# Patient Record
Sex: FEMALE | Race: WHITE | HISPANIC OR LATINO | Employment: FULL TIME | URBAN - METROPOLITAN AREA
[De-identification: names, ages, dates, MRNs, and addresses within clinical notes are randomized per-mention and may not be internally consistent; named-entity substitution may affect disease eponyms.]

---

## 2017-01-26 ENCOUNTER — HOSPITAL ENCOUNTER (OUTPATIENT)
Dept: CT IMAGING | Facility: CLINIC | Age: 57
Discharge: HOME | End: 2017-01-26
Attending: INTERNAL MEDICINE | Admitting: INTERNAL MEDICINE
Payer: COMMERCIAL

## 2017-01-26 DIAGNOSIS — K43.9 VENTRAL HERNIA WITHOUT OBSTRUCTION OR GANGRENE: ICD-10-CM

## 2017-01-26 PROCEDURE — 74176 CT ABD & PELVIS W/O CONTRAST: CPT

## 2017-01-26 PROCEDURE — 25500064 ZZH RX 255 OP 636: Performed by: INTERNAL MEDICINE

## 2017-01-26 PROCEDURE — 25000125 ZZHC RX 250: Performed by: INTERNAL MEDICINE

## 2017-01-26 RX ORDER — IOPAMIDOL 755 MG/ML
100 INJECTION, SOLUTION INTRAVASCULAR ONCE
Status: DISCONTINUED | OUTPATIENT
Start: 2017-01-26 | End: 2017-01-27 | Stop reason: HOSPADM

## 2017-01-26 NOTE — LETTER
2/3/2017         Katie Hernandez   [address]  [facility] [address]        Dear Ms. Hernandez:    The results of your recent Your CT demonstrated colitis (inflammation of the colon) and cholelithiasis (gallstones). Recommend consultation with GI and colonoscopy.    Results for orders placed or performed during the hospital encounter of 01/26/17   CT Abdomen Pelvis w/o Contrast    Narrative    CT ABDOMEN/PELVIS WITHOUT CONTRAST 1/26/2017 4:27 PM    HISTORY: Left upper quadrant pain.    TECHNIQUE: Scans were obtained from the diaphragm through the pelvis  without IV contrast. Radiation dose for this scan was reduced using  automated exposure control, adjustment of the mA and/or kV according  to patient size, or iterative reconstruction technique.    COMPARISON: None.    FINDINGS: Liver, spleen, and pancreas are unremarkable. Multiple small  gallstones in the dependent portion of the gallbladder. Large cyst in  the lower pole of the left kidney. Both kidneys are otherwise normal.  Long section of significant wall thickening involving the descending  colon consistent with colitis. Moderate amount of feces seen in the  proximal colon. Small bowel is unremarkable without evidence of  obstruction. No abdominal or pelvic adenopathy. Remainder of the scan  is negative.      Impression    IMPRESSION:   1. Colitis involving a long segment of the descending colon.  2. Cholelithiasis.  3. Left renal cyst.  4. Remainder of the scan is negative.    CHANCE REGAN MD         Please note that test explanations are brief and do not reflect all diagnostic uses.  If you have any questions or concerns, please call the clinic at [phone].      Sincerely,      Miranda Chung sent on behalf of  Genevieve Leong MD

## 2017-01-27 ENCOUNTER — TELEPHONE (OUTPATIENT)
Dept: OBGYN | Facility: CLINIC | Age: 57
End: 2017-01-27

## 2017-01-27 ENCOUNTER — CARE COORDINATION (OUTPATIENT)
Dept: GASTROENTEROLOGY | Facility: CLINIC | Age: 57
End: 2017-01-27

## 2017-01-27 DIAGNOSIS — R10.12 ABDOMINAL PAIN, LEFT UPPER QUADRANT: Primary | ICD-10-CM

## 2017-01-27 NOTE — PROGRESS NOTES
Received call from Dr. Leong in [facility] that she would like patient seen in GI clinic within 2 weeks for new dx of colitis noted on recent CT scan.    Contacted patient and offered her an appointment on 1/30 with Dr. Espitia. Patient refused appointment and will follow up with GI team she was previously seeing. Did discuss with patient that we are available and she can contact us if she changes her mind.     Patient verbalized understanding.

## 2017-01-27 NOTE — TELEPHONE ENCOUNTER
CT of the abdomen was consistent with colitis. Recommend colonoscopy and evaluation by GI.  Genevieve Leong MD

## 2017-01-27 NOTE — TELEPHONE ENCOUNTER
Received call from Katie stating she just missed a call from us.    Review of EPIC shows Dr. Leong called and made recommendations regarding CT scan results. This recommendation for colonoscopy and GI evaluation for result of colitis was given to Katie. She does not believe that is what she has - believes it is more likely Crohn's or just had a bout with the flu and is going to wait on the colonoscopy as she just had one last year. She will go to her GI provider for consultation. She had no further questions.

## 2017-02-03 NOTE — ADDENDUM NOTE
Encounter addended by: Miranda Chung LPN on: 2/3/2017  1:03 PM<BR>     Documentation filed: Letters, Message

## 2017-02-14 ENCOUNTER — TELEPHONE (OUTPATIENT)
Dept: OBGYN | Facility: CLINIC | Age: 57
End: 2017-02-14

## 2017-02-14 DIAGNOSIS — K52.9 COLITIS: Primary | ICD-10-CM

## 2017-02-14 NOTE — TELEPHONE ENCOUNTER
Katie was able to schedule an appt with [facility] for March 8,2017  [address], [place] [address]  Ph.  [phone]  Fax [phone]    I will put in the GI referral and fax that along with her CT results.Pt indicated understanding and agreed with plan.

## 2017-02-14 NOTE — TELEPHONE ENCOUNTER
Received message on triage voicemail from Katie stating she called her GI and got an appointment for mid-March, called [facility] but couldn't get in until June. She is wondering if she should be seen sooner.    Review of EPIC shows pt was supposed to be seen within a few weeks of end of January. Dr. Leong had arranged for this but pt declined visit.    Spoke with Dr. Leong. She would like to know which GI Clinic Katie would like to be seen at. Can send CT results to her own GI provider so they can get her in sooner.    Tried to reach Katie but received voicemail.  Left message to call back.

## 2017-03-02 ENCOUNTER — OFFICE VISIT (OUTPATIENT)
Dept: UROLOGY | Facility: CLINIC | Age: 57
End: 2017-03-02

## 2017-03-02 VITALS
DIASTOLIC BLOOD PRESSURE: 64 MMHG | HEART RATE: 80 BPM | HEIGHT: 64 IN | WEIGHT: 140 LBS | BODY MASS INDEX: 23.9 KG/M2 | SYSTOLIC BLOOD PRESSURE: 124 MMHG

## 2017-03-02 DIAGNOSIS — R10.9 LEFT FLANK PAIN: ICD-10-CM

## 2017-03-02 DIAGNOSIS — R10.9 LEFT FLANK PAIN: Primary | ICD-10-CM

## 2017-03-02 LAB
ALBUMIN UR-MCNC: NEGATIVE MG/DL
APPEARANCE UR: CLEAR
BILIRUB UR QL STRIP: NEGATIVE
COLOR UR AUTO: YELLOW
GLUCOSE UR STRIP-MCNC: NEGATIVE MG/DL
HGB UR QL STRIP: ABNORMAL
KETONES UR STRIP-MCNC: NEGATIVE MG/DL
LEUKOCYTE ESTERASE UR QL STRIP: NEGATIVE
NITRATE UR QL: NEGATIVE
PH UR STRIP: 6.5 PH (ref 5–7)
SP GR UR STRIP: 1.01 (ref 1–1.03)
URN SPEC COLLECT METH UR: ABNORMAL
UROBILINOGEN UR STRIP-ACNC: 0.2 EU/DL (ref 0.2–1)

## 2017-03-02 PROCEDURE — 99203 OFFICE O/P NEW LOW 30 MIN: CPT | Performed by: UROLOGY

## 2017-03-02 PROCEDURE — 81003 URINALYSIS AUTO W/O SCOPE: CPT | Performed by: UROLOGY

## 2017-03-02 ASSESSMENT — PAIN SCALES - GENERAL: PAINLEVEL: NO PAIN (0)

## 2017-03-02 NOTE — PROGRESS NOTES
"History: It is placed to see this very pleasant 56-year-old lady in follow-up consultation today.  She is seeing me today because she is concerned about some back pain and does have a previous history of a large cyst in her left kidney consider a Bosniak 2 cyst.  She also has a past history of urinary stone disease.  She did have a hysterectomy 3 years ago.  She also has a recent diagnosis of ulcerative colitis.  She was concerned that the pain in the back may be related to the cyst in the kidney or some other nephrologic cause.  [facility] things listed above is otherwise stable at the present time    Past Medical History   Diagnosis Date     Abnormal stools 5/30/2013     Anemia      \"Related to heavy periods\"     Complex endometrial hyperplasia with atypia 10/17/2013     Path dx w complete hysterectomy.      Kidney cysts      left seen on CT     Macular degeneration 2015     Taking vitamin.     Menarche      cycles q 28 x 5 d     Menorrhagia 5/16/2007     Oct 2009 ->anemia to 7+ gms.  Resolved with OTC po FE 50 mg/d      Precancerous skin lesion      excised -- abdomen. ->yrly Derm cks     S/P total hysterectomy and bilateral salpingo-oophorectomy 10/17/2013     Path: -->Complex endometrial hyperplasia with atypia.      Swelling of the ankle, feet, or leg      Ankles     Unspecified hemorrhoids without mention of complication 2007     bleeding to anemia -- hgb 8.4 and lower....     Uterine leiomyoma 5/13/2009     [place] diagnosis [place] in [place] 8/[place]/2011 US in this EMR confirms multiple myomas with a 12 mm endometrial stripe.  (Problem list name updated by automated process. Provider to review and confirm*       Social History     Social History     Marital status:      Spouse name: N/A     Number of children: N/A     Years of education: N/A     Occupational History     Faculty [employer]     Teaches Translation     Social History Main Topics     Smoking status: Never Smoker     Smokeless " tobacco: Never Used     Alcohol use Yes      Comment: 1/2 d / mo     Drug use: No     Sexual activity: Yes     Partners: Male     Birth control/ protection: Surgical      Comment: Vastectomy     Other Topics Concern     Blood Transfusions No     Caffeine Concern No     Stopped all caffiene 2nd to breast pain     Occupational Exposure No     Hobby Hazards No     Sleep Concern No     Stress Concern No     Weight Concern No     Special Diet No     Back Care No     Exercise No     45' aerobic 3xs/wk     Bike Helmet No     Seat Belt No     Social History Narrative       Past Surgical History   Procedure Laterality Date     Colonoscopy       virtual     Appendectomy open  age 3      section   and      C lap,surg,colectomy, partial, w/anast  age 3     tumor in colon as child (removed tumor and appendix)     Laparoscopy diagnostic (gyn)       ectopic excision     Colonoscopy  2013     Procedure: COLONOSCOPY;  COLONOSCOPY;  Surgeon: Db Reed MD;  Location:  GI     Dilation and curettage, operative hysteroscopy with morcellator, combined  2013     Procedure: COMBINED DILATION AND CURETTAGE, OPERATIVE HYSTEROSCOPY WITH MORCELLATOR;  Dilation and Curettage, Hysteroscopy, Hysteroscopic  Polypectomy with Myosure ;  Surgeon: Concepción Gamboa MD;  Location: [facility] OR     Davinci hysterectomy total, bilateral salpingo-oophorectomy, combined  10/17/2013     Procedure: COMBINED DAVINCI HYSTERECTOMY TOTAL, SALPINGO-OOPHORECTOMY;  DaVinci Assisted Total Laparoscopic Hysterectomy/Bilateral Salpingo Oophorectomy Cystoscopy  ;  Surgeon: Zoya Vance MD;  Location: [facility] OR       Family History   Problem Relation Age of Onset     DIABETES Father 75     DMII     Hypertension Father 65     Hypertension Mother 45     Eye Disorder Mother      macular degeneration     Psychotic Disorder Mother      Borderline Personality Disorder ([id number])     Breast Cancer Paternal Grandmother 45       "age 55     CANCER Maternal Grandmother 60     uterine/breast     CANCER Maternal Aunt 60     ovarian     Cancer - colorectal Maternal Aunt      possible         Current Outpatient Prescriptions:      Multiple Vitamins-Minerals (VITEYES AREDS FORMULA/LUTEIN) CAPS, Take 1 tablet by mouth daily, Disp: , Rfl:      magnesium 100 MG CAPS, Take by mouth daily, Disp: , Rfl:      FISH OIL, daily, Disp: , Rfl:      Calcium Carbonate-Vitamin D (CALCIUM + D PO), , Disp: , Rfl:      Coenzyme Q10 (CO Q-10 PO), Take 1 capsule by mouth daily, Disp: , Rfl:      Ascorbic Acid (VITAMIN C CR PO), Take 1 tablet by mouth daily., Disp: , Rfl:     10 point ROS of systems including Constitutional, Eyes, Respiratory, Cardiovascular, Gastroenterology, Genitourinary, Integumentary, Muscularskeletal, Psychiatric were all negative except for pertinent positives noted in my HPI.    Examination:   /64 (BP Location: Right arm)  Pulse 80  Ht 1.626 m (5' 4\")  Wt 63.5 kg (140 lb)  LMP 08/20/2013  BMI 24.03 kg/m2  General Impression: Very pleasant lady in no acute distress, well-oriented in time place and person  Mental Status: Normal.  HEENT.  There is no evidence of jaundice and the mucous membranes are normal  Skin: His skin is normal to examination  Respiratory System: The respiratory cycle is normal  Lymph Nodes: Not examined  Back/Flank Tenderness: There is no evidence of kyphosis, scoliosis or lordosis  There is slight tenderness over each renal angle perhaps slightly greater on the right than the left.  Lateral rotation of the spine does create some discomfort in the same area of the back although flexion and extension does not seem to cause any significant symptoms  Cardiovascular System: Not examined  Abdominal Examination: Not examined  Extremities: Not examined  Genitial: Not examined  Rectal Examination: Not examined  Neurologic System: There are no focal abnormal neurological signs in the central or peripheral nervous " "systems    Impression: I carefully reviewed a recent CT scan which does show that the cyst lower pole of the left kidney is unchanged in size and configuration since her scan done 2 years ago.  There is no evidence of any stones in the kidney.  There is no evidence of hydronephrosis.  There is no evidence of any other renal masses.  There is no evidence of any renal atrophy.  The CT scan did however indicate involvement of the descending colon by ulcerative colitis.  She also does have some gallstones.  Urinalysis today was negative and the urine showed a specific gravity of 1.015.  There were no other remarkable features.  I think that the pain she's been getting in her back is probably musculoskeletal and not related to a visceral source.  I see no reason to attribute any of these symptoms to a renal etiology.  I would recommend that she have an ultrasound of the kidney every 2 years and that renal function tests should be a regular part of her routine physical examination.  I explained the entire situation carefully to her in detail today.  I answered all her questions    Plan: I will see her on a p.r.n. basis    Time: 30 minutes.  Greater than 50% was spent in discussion and consultation.    \"This dictation was performed with voice recognition software and may contain errors,  omissions and inadvertent word substitution.\"      "

## 2017-03-02 NOTE — NURSING NOTE
Chief Complaint   Patient presents with     Clinic Care Coordination - Follow-up     2 year follow-up      Yamilex Alonso LPN

## 2017-03-02 NOTE — MR AVS SNAPSHOT
After Visit Summary   3/2/2017    Katie Heranndez    MRN: [id number]           Patient Information     Date Of Birth          1960        Visit Information        Provider Department      3/2/2017 3:50 PM Geovani Dobson MD [facility]        Today's Diagnoses     Left flank pain           Follow-ups after your visit        Follow-up notes from your care team     Return if symptoms worsen or fail to improve.      Your next 10 appointments already scheduled     Mar 16, 2017  3:45 PM CDT   Return Visit with Chelsi Hay MD   [facility] ([facility])    [facility] [address]  [address]  [facility] [address] [phone]              Who to contact     If you have questions or need follow up information about today's clinic visit or your schedule please contact [facility] directly at [phone].  Normal or non-critical lab and imaging results will be communicated to you by [employer], letter or phone within 4 business days after the clinic has received the results. If you do not hear from us within 7 days, please contact the clinic through [id number] or phone. If you have a critical or abnormal lab result, we will notify you by phone as soon as possible.  Submit refill requests through [employer] or call your pharmacy and they will forward the refill request to us. Please allow 3 business days for your refill to be completed.          Additional Information About Your Visit        MyChart Information     [employer] gives you secure access to your electronic health record. If you see a primary care provider, you can also send messages to your care team and make appointments. If you have questions, please call your primary care clinic.  If you do not have a primary care provider, please call [phone] and they will assist you.        Care  "EveryWhere ID     This is your Care EveryWhere ID. This could be used by other organizations to access your [place] medical records  [id number]        Your Vitals Were     Pulse Height Last Period BMI (Body Mass Index)          80 1.626 m (5' 4\") 08/20/2013 24.03 kg/m2         Blood Pressure from Last 3 Encounters:   03/02/17 124/64   12/21/16 101/66   07/20/16 93/62    Weight from Last 3 Encounters:   03/02/17 63.5 kg (140 lb)   12/21/16 63.5 kg (140 lb)   07/20/16 61 kg (134 lb 8 oz)              We Performed the Following     UA without Microscopic        Primary Care Provider Office Phone # Fax #    Genevieve Terrance Leong [phone]        [facility] [address] [place] [phone]  [facility] [address]        Thank you!     Thank you for choosing [facility] [place]  for your care. Our goal is always to provide you with excellent care. Hearing back from our patients is one way we can continue to improve our services. Please take a few minutes to complete the written survey that you may receive in the mail after your visit with us. Thank you!             Your Updated Medication List - Protect others around you: Learn how to safely use, store and throw away your medicines at www.disposemymeds.org.          This list is accurate as of: 3/2/17 11:59 PM.  Always use your most recent med list.                   Brand Name Dispense Instructions for use    CALCIUM + D PO          CO Q-10 PO      Take 1 capsule by mouth daily       FISH OIL      daily       magnesium 100 MG Caps      Take by mouth daily       VITAMIN C CR PO      Take 1 tablet by mouth daily.       VITEYES AREDS FORMULA/LUTEIN Caps      Take 1 tablet by mouth daily         "

## 2017-03-02 NOTE — LETTER
"3/2/2017       RE: Katie Hernandez  [address]  [facility] [address]     Dear Colleague,    Thank you for referring your patient, Katie Hernandez, to the [facility] at [facility]. Please see a copy of my visit note below.    History: It is placed to see this very pleasant 56-year-old lady in follow-up consultation today.  She is seeing me today because she is concerned about some back pain and does have a previous history of a large cyst in her left kidney consider a Bosniak 2 cyst.  She also has a past history of urinary stone disease.  She did have a hysterectomy 3 years ago.  She also has a recent diagnosis of ulcerative colitis.  She was concerned that the pain in the back may be related to the cyst in the kidney or some other nephrologic cause.  [facility] things listed above is otherwise stable at the present time    Past Medical History   Diagnosis Date     Abnormal stools 5/30/2013     Anemia      \"Related to heavy periods\"     Complex endometrial hyperplasia with atypia 10/17/2013     Path dx w complete hysterectomy.      Kidney cysts      left seen on CT     Macular degeneration 2015     Taking vitamin.     Menarche      cycles q 28 x 5 d     Menorrhagia 5/16/2007     Oct 2009 ->anemia to 7+ gms.  Resolved with OTC po FE 50 mg/d      Precancerous skin lesion      excised -- abdomen. ->yrly Derm cks     S/P total hysterectomy and bilateral salpingo-oophorectomy 10/17/2013     Path: -->Complex endometrial hyperplasia with atypia.      Swelling of the ankle, feet, or leg      Ankles     Unspecified hemorrhoids without mention of complication 2007     bleeding to anemia -- hgb 8.4 and lower....     Uterine leiomyoma 5/13/2009     [place] 8/10/2011 US in this EMR confirms multiple myomas with a 12 mm endometrial stripe.  (Problem list name updated by automated process. 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     Social History Main Topics     Smoking status: Never Smoker     Smokeless tobacco: Never Used     Alcohol use Yes      Comment: 1/2 d / mo     Drug use: No     Sexual activity: Yes     Partners: Male     Birth control/ protection: Surgical      Comment: Vastectomy     Other Topics Concern     Blood Transfusions No     Caffeine Concern No     Stopped all caffiene 2nd to breast pain     Occupational Exposure No     Hobby Hazards No     Sleep Concern No     Stress Concern No     Weight Concern No     Special Diet No     Back Care No     Exercise No     45' aerobic 3xs/wk     Bike Helmet No     Seat Belt No     Social History Narrative       Past Surgical History   Procedure Laterality Date     Colonoscopy       virtual     Appendectomy open  age 3      section   and      C lap,surg,colectomy, partial, w/anast  age 3     tumor in colon as child (removed tumor and appendix)     Laparoscopy diagnostic (gyn)       ectopic excision     Colonoscopy  2013     Procedure: COLONOSCOPY;  COLONOSCOPY;  Surgeon: Db Reed MD;  Location:  GI     Dilation and curettage, operative hysteroscopy with morcellator, combined  2013     Procedure: COMBINED DILATION AND CURETTAGE, OPERATIVE HYSTEROSCOPY WITH MORCELLATOR;  Dilation and Curettage, Hysteroscopy, Hysteroscopic  Polypectomy with Myosure ;  Surgeon: Concepción Gamboa MD;  Location: [facility] OR     Davinci hysterectomy total, bilateral salpingo-oophorectomy, combined  10/17/2013     Procedure: COMBINED DAVINCI HYSTERECTOMY TOTAL, SALPINGO-OOPHORECTOMY;  DaVinci Assisted Total Laparoscopic Hysterectomy/Bilateral Salpingo Oophorectomy Cystoscopy  ;  Surgeon: Zoya Vance MD;  Location: [facility] OR  "      Family History   Problem Relation Age of Onset     DIABETES Father 75     DMII     Hypertension Father 65     Hypertension Mother 45     Eye Disorder Mother      macular degeneration     Psychotic Disorder Mother      Borderline Personality Disorder ([id number])     Breast Cancer Paternal Grandmother 45      age 55     CANCER Maternal Grandmother 60     uterine/breast     CANCER Maternal Aunt 60     ovarian     Cancer - colorectal Maternal Aunt      possible         Current Outpatient Prescriptions:      Multiple Vitamins-Minerals (VITEYES AREDS FORMULA/LUTEIN) CAPS, Take 1 tablet by mouth daily, Disp: , Rfl:      magnesium 100 MG CAPS, Take by mouth daily, Disp: , Rfl:      FISH OIL, daily, Disp: , Rfl:      Calcium Carbonate-Vitamin D (CALCIUM + D PO), , Disp: , Rfl:      Coenzyme Q10 (CO Q-10 PO), Take 1 capsule by mouth daily, Disp: , Rfl:      Ascorbic Acid (VITAMIN C CR PO), Take 1 tablet by mouth daily., Disp: , Rfl:     10 point ROS of systems including Constitutional, Eyes, Respiratory, Cardiovascular, Gastroenterology, Genitourinary, Integumentary, Muscularskeletal, Psychiatric were all negative except for pertinent positives noted in my HPI.    Examination:   /64 (BP Location: Right arm)  Pulse 80  Ht 1.626 m (5' 4\")  Wt 63.5 kg (140 lb)  LMP 2013  BMI 24.03 kg/m2  General Impression: Very pleasant lady in no acute distress, well-oriented in time place and person  Mental Status: Normal.  HEENT.  There is no evidence of jaundice and the mucous membranes are normal  Skin: His skin is normal to examination  Respiratory System: The respiratory cycle is normal  Lymph Nodes: Not examined  Back/Flank Tenderness: There is no evidence of kyphosis, scoliosis or lordosis  There is slight tenderness over each renal angle perhaps slightly greater on the right than the left.  Lateral rotation of the spine does create some discomfort in the same area of the back although flexion and extension does not " "seem to cause any significant symptoms  Cardiovascular System: Not examined  Abdominal Examination: Not examined  Extremities: Not examined  Genitial: Not examined  Rectal Examination: Not examined  Neurologic System: There are no focal abnormal neurological signs in the central or peripheral nervous systems    Impression: I carefully reviewed a recent CT scan which does show that the cyst lower pole of the left kidney is unchanged in size and configuration since her scan done 2 years ago.  There is no evidence of any stones in the kidney.  There is no evidence of hydronephrosis.  There is no evidence of any other renal masses.  There is no evidence of any renal atrophy.  The CT scan did however indicate involvement of the descending colon by ulcerative colitis.  She also does have some gallstones.  Urinalysis today was negative and the urine showed a specific gravity of 1.015.  There were no other remarkable features.  I think that the pain she's been getting in her back is probably musculoskeletal and not related to a visceral source.  I see no reason to attribute any of these symptoms to a renal etiology.  I would recommend that she have an ultrasound of the kidney every 2 years and that renal function tests should be a regular part of her routine physical examination.  I explained the entire situation carefully to her in detail today.  I answered all her questions    Plan: I will see her on a p.r.n. basis    Time: 30 minutes.  Greater than 50% was spent in discussion and consultation.    \"This dictation was performed with voice recognition software and may contain errors,  omissions and inadvertent word substitution.\"        Again, thank you for allowing me to participate in the care of your patient.      Sincerely,    Geovani Dobosn MD      "

## 2017-03-08 ENCOUNTER — TRANSFERRED RECORDS (OUTPATIENT)
Dept: HEALTH INFORMATION MANAGEMENT | Facility: CLINIC | Age: 57
End: 2017-03-08

## 2017-03-13 RX ORDER — ONDANSETRON 2 MG/ML
4 INJECTION INTRAMUSCULAR; INTRAVENOUS
Status: CANCELLED | OUTPATIENT
Start: 2017-03-13

## 2017-03-13 RX ORDER — LIDOCAINE 40 MG/G
CREAM TOPICAL
Status: CANCELLED | OUTPATIENT
Start: 2017-03-13

## 2017-03-14 ENCOUNTER — HOSPITAL ENCOUNTER (OUTPATIENT)
Facility: CLINIC | Age: 57
Discharge: HOME | End: 2017-03-14
Attending: COLON & RECTAL SURGERY | Admitting: COLON & RECTAL SURGERY
Payer: COMMERCIAL

## 2017-03-14 ENCOUNTER — TRANSFERRED RECORDS (OUTPATIENT)
Dept: HEALTH INFORMATION MANAGEMENT | Facility: CLINIC | Age: 57
End: 2017-03-14

## 2017-03-14 ENCOUNTER — SURGERY (OUTPATIENT)
Age: 57
End: 2017-03-14

## 2017-03-14 VITALS
OXYGEN SATURATION: 97 % | RESPIRATION RATE: 14 BRPM | DIASTOLIC BLOOD PRESSURE: 74 MMHG | HEART RATE: 78 BPM | SYSTOLIC BLOOD PRESSURE: 108 MMHG

## 2017-03-14 LAB — COLONOSCOPY: NORMAL

## 2017-03-14 PROCEDURE — 25000128 H RX IP 250 OP 636: Performed by: COLON & RECTAL SURGERY

## 2017-03-14 PROCEDURE — 25000125 ZZHC RX 250: Performed by: COLON & RECTAL SURGERY

## 2017-03-14 PROCEDURE — G0500 MOD SEDAT ENDO SERVICE >5YRS: HCPCS | Performed by: COLON & RECTAL SURGERY

## 2017-03-14 PROCEDURE — 88305 TISSUE EXAM BY PATHOLOGIST: CPT | Performed by: COLON & RECTAL SURGERY

## 2017-03-14 PROCEDURE — 88305 TISSUE EXAM BY PATHOLOGIST: CPT | Mod: 26 | Performed by: COLON & RECTAL SURGERY

## 2017-03-14 PROCEDURE — 45380 COLONOSCOPY AND BIOPSY: CPT | Performed by: COLON & RECTAL SURGERY

## 2017-03-14 RX ORDER — FLUMAZENIL 0.1 MG/ML
0.2 INJECTION, SOLUTION INTRAVENOUS
Status: DISCONTINUED | OUTPATIENT
Start: 2017-03-14 | End: 2017-03-14 | Stop reason: HOSPADM

## 2017-03-14 RX ORDER — NALOXONE HYDROCHLORIDE 0.4 MG/ML
.1-.4 INJECTION, SOLUTION INTRAMUSCULAR; INTRAVENOUS; SUBCUTANEOUS
Status: DISCONTINUED | OUTPATIENT
Start: 2017-03-14 | End: 2017-03-14 | Stop reason: HOSPADM

## 2017-03-14 RX ORDER — ONDANSETRON 4 MG/1
4 TABLET, ORALLY DISINTEGRATING ORAL EVERY 6 HOURS PRN
Status: DISCONTINUED | OUTPATIENT
Start: 2017-03-14 | End: 2017-03-14 | Stop reason: HOSPADM

## 2017-03-14 RX ORDER — FENTANYL CITRATE 50 UG/ML
INJECTION, SOLUTION INTRAMUSCULAR; INTRAVENOUS PRN
Status: DISCONTINUED | OUTPATIENT
Start: 2017-03-14 | End: 2017-03-14 | Stop reason: HOSPADM

## 2017-03-14 RX ORDER — ONDANSETRON 2 MG/ML
4 INJECTION INTRAMUSCULAR; INTRAVENOUS EVERY 6 HOURS PRN
Status: DISCONTINUED | OUTPATIENT
Start: 2017-03-14 | End: 2017-03-14 | Stop reason: HOSPADM

## 2017-03-14 RX ADMIN — FENTANYL CITRATE 100 MCG: 50 INJECTION, SOLUTION INTRAMUSCULAR; INTRAVENOUS at 14:28

## 2017-03-14 RX ADMIN — MIDAZOLAM HYDROCHLORIDE 1 MG: 1 INJECTION, SOLUTION INTRAMUSCULAR; INTRAVENOUS at 14:28

## 2017-03-14 NOTE — BRIEF OP NOTE
[facility] Brief Operative Note    Pre-operative diagnosis: Abnormal CT scan, RULE OUT COLITIS   Post-operative diagnosis normal appearing colon   Procedure: Procedure(s):  COLONOSCOPY - Wound Class: II-Clean Contaminated   Surgeon(s): Surgeon(s) and Role:     * Db Reed MD - Primary   Estimated blood loss: * No values recorded between 3/14/2017 12:00 AM and 3/14/2017  3:00 PM *    Specimens:   ID Type Source Tests Collected by Time Destination   A : random right sided colon biopsies Biopsy Large Intestine, Right/Ascending SURGICAL PATHOLOGY EXAM Db Reed MD 3/14/2017  2:53 PM    B : random transverse colon biopsies Biopsy Large Intestine, Transverse SURGICAL PATHOLOGY EXAM Db Reed MD 3/14/2017  2:54 PM    C : random left sided colon biopsies Biopsy Large Intestine, Left/Descending SURGICAL PATHOLOGY EXAM Db Reed MD 3/14/2017  2:54 PM    D : random sigmoid and rectal biopsies Biopsy Large Intestine, Sigmoid SURGICAL PATHOLOGY EXAM Db Reed MD 3/14/2017  2:55 PM       Findings: normal appearing colon  See provation procedure note in chart review.    Db Reed MD  [facility]  [phone]

## 2017-03-15 LAB — COPATH REPORT: NORMAL

## 2017-03-16 ENCOUNTER — OFFICE VISIT (OUTPATIENT)
Dept: OBGYN | Facility: CLINIC | Age: 57
End: 2017-03-16
Attending: OBSTETRICS & GYNECOLOGY
Payer: COMMERCIAL

## 2017-03-16 VITALS
DIASTOLIC BLOOD PRESSURE: 69 MMHG | SYSTOLIC BLOOD PRESSURE: 103 MMHG | WEIGHT: 139.1 LBS | HEART RATE: 74 BPM | HEIGHT: 64 IN | BODY MASS INDEX: 23.75 KG/M2

## 2017-03-16 DIAGNOSIS — Z12.31 ENCOUNTER FOR SCREENING MAMMOGRAM FOR BREAST CANCER: Primary | ICD-10-CM

## 2017-03-16 NOTE — MR AVS SNAPSHOT
After Visit Summary   3/16/2017    Katie Hernandez    MRN: [id number]           Patient Information     Date Of Birth          1960        Visit Information        Provider Department      3/16/2017 3:45 PM Chelsi Hay MD [facility]        Today's Diagnoses     Encounter for screening mammogram for breast cancer    -  1       Follow-ups after your visit        Future tests that were ordered for you today     Open Future Orders        Priority Expected Expires Ordered    Mammo Screening digital (bilateral) Routine  3/16/2018 3/16/2017            Who to contact     Please call your clinic at [phone] to:    Ask questions about your health    Make or cancel appointments    Discuss your medicines    Learn about your test results    Speak to your doctor   If you have compliments or concerns about an experience at your clinic, or if you wish to file a complaint, please contact [facility] Patient Relations at [phone] or email us at [email or web address]         Additional Information About Your Visit        MyChart Information     [employer] gives you secure access to your electronic health record. If you see a primary care provider, you can also send messages to your care team and make appointments. If you have questions, please call your primary care clinic.  If you do not have a primary care provider, please call [phone] and they will assist you.      [employer] is an electronic gateway that provides easy, online access to your medical records. With [employer], you can request a clinic appointment, read your test results, renew a prescription or communicate with your care team.     To access your existing account, please contact your [facility] or call [phone] for assistance.        Care EveryWhere ID     This is your Care EveryWhere ID. This could be used by other organizations to access  "your [facility] medical records  [id number]        Your Vitals Were     Pulse Height Last Period BMI (Body Mass Index)          74 1.626 m (5' 4\") 08/20/2013 23.88 kg/m2         Blood Pressure from Last 3 Encounters:   03/16/17 103/69   03/14/17 108/74   03/02/17 124/64    Weight from Last 3 Encounters:   03/16/17 63.1 kg (139 lb 1.6 oz)   03/02/17 63.5 kg (140 lb)   12/21/16 63.5 kg (140 lb)               Primary Care Provider Office Phone # Fax #    Genevieve Terrance Leong [phone]        [facility] [address]  [facility] [address]        Thank you!     Thank you for choosing [facility]  for your care. Our goal is always to provide you with excellent care. Hearing back from our patients is one way we can continue to improve our services. Please take a few minutes to complete the written survey that you may receive in the mail after your visit with us. Thank you!             Your Updated Medication List - Protect others around you: Learn how to safely use, store and throw away your medicines at www.disposemymeds.org.          This list is accurate as of: 3/16/17 11:59 PM.  Always use your most recent med list.                   Brand Name Dispense Instructions for use    CALCIUM + D PO          CO Q-10 PO      Take 1 capsule by mouth daily       FISH OIL      daily       magnesium 100 MG Caps      Take by mouth daily       VITAMIN C CR PO      Take 1 tablet by mouth daily.       VITEYES AREDS FORMULA/LUTEIN Caps      Take 1 tablet by mouth daily         "

## 2017-03-16 NOTE — LETTER
"3/16/2017       RE: Katie Hernandez  [address]  [facility] [address]     Dear Colleague,    Thank you for referring your patient, Katie Hernandez, to the [facility] at [facility]. Please see a copy of my visit note below.    S: 55 yo PM woman presents with concerns for left rib pain. States it is worse with lying on left side. Also new diagnosis of colitis viral versus ulcerative and is worried inflammation may be making everything worse including this pain.   Patient Active Problem List   Diagnosis     Anemia due to acute blood loss     Mild intermittent asthma     Lipoma of axilla -- left     Cyst of breast, right, solitary     Stiffness of joint, hand     Anemia     S/P total hysterectomy and bilateral salpingo-oophorectomy     Fibrocystic breast changes     Other anterior corneal dystrophies     Past Medical History   Diagnosis Date     Abnormal stools 5/30/2013     Anemia      \"Related to heavy periods\"     Complex endometrial hyperplasia with atypia 10/17/2013     Path dx w complete hysterectomy.      Kidney cysts      left seen on CT     Macular degeneration 2015     Taking vitamin.     Menarche      cycles q 28 x 5 d     Menorrhagia 5/16/2007     Oct 2009 ->anemia to 7+ gms.  Resolved with OTC po FE 50 mg/d      Precancerous skin lesion      excised -- abdomen. ->yrly Derm cks     S/P total hysterectomy and bilateral salpingo-oophorectomy 10/17/2013     Path: -->Complex endometrial hyperplasia with atypia.      Swelling of the ankle, feet, or leg      Ankles     Unspecified hemorrhoids without mention of complication 2007     bleeding to anemia -- hgb 8.4 and lower....     Uterine leiomyoma 5/13/2009     [place] diagnosis [place] 8/10/2011 US in this EMR confirms multiple myomas with a 12 mm endometrial stripe.  (Problem list name updated by automated process. Provider to review and confirm*     Past Surgical History " "  Procedure Laterality Date     Colonoscopy       virtual     Appendectomy open  age 3      section   and      C lap,surg,colectomy, partial, w/anast  age 3     tumor in colon as child (removed tumor and appendix)     Laparoscopy diagnostic (gyn)       ectopic excision     Colonoscopy  2013     Procedure: COLONOSCOPY;  COLONOSCOPY;  Surgeon: Db Reed MD;  Location:  GI     Dilation and curettage, operative hysteroscopy with morcellator, combined  2013     Procedure: COMBINED DILATION AND CURETTAGE, OPERATIVE HYSTEROSCOPY WITH MORCELLATOR;  Dilation and Curettage, Hysteroscopy, Hysteroscopic  Polypectomy with Myosure ;  Surgeon: Concepción Gamboa MD;  Location: [facility] OR     Davinci hysterectomy total, bilateral salpingo-oophorectomy, combined  10/17/2013     Procedure: COMBINED DAVINCI HYSTERECTOMY TOTAL, SALPINGO-OOPHORECTOMY;  DaVinci Assisted Total Laparoscopic Hysterectomy/Bilateral Salpingo Oophorectomy Cystoscopy  ;  Surgeon: Zoya Vance MD;  Location: [facility] OR     Colonoscopy N/A 3/14/2017     Procedure: COMBINED COLONOSCOPY, SINGLE OR MULTIPLE BIOPSY/POLYPECTOMY BY BIOPSY;  Surgeon: Db Reed MD;  Location:  GI     Obstetric History       T2      TAB0   SAB2   E1   M0   L2       # Outcome Date GA Lbr Lenard/2nd Weight Sex Delivery Anes PTL Lv   5 Term 10/26/97 38w0d 21:00 3.7 kg (8 lb 2.5 oz) M CS-LTranv   LIVE BIRTH      Name: Christopher   4 Term 92 37w0d 26:00 3.6 kg (7 lb 15 oz) F CS-LTranv   LIVE BIRTH      Name: Keren   3 Ectopic            2 SAB            1 SAB                 /69  Pulse 74  Ht 1.626 m (5' 4\")  Wt 63.1 kg (139 lb 1.6 oz)  LMP 2013  BMI 23.88 kg/m2  Exam:  Constitutional: healthy, alert and mild distress  Head:   Neck: Neck supple. No adenopathy. Thyroid symmetric, normal size,, Carotids without bruits.  ENT:   Cardiovascular: negative, PMI normal. No lifts, heaves, or thrills. " RRR. No murmurs, clicks gallops or rub  Respiratory: negative, Percussion normal. Good diaphragmatic excursion. Lungs clear  Chest: no axillary lymphadenopathy bilaterally. Ribs without pain with palpation or movement. Sternum nontender.   Gastrointestinal: Abdomen soft, non-tender. BS normal. No masses, organomegaly  : Deferred  Musculoskeletal: extremities normal- no gross deformities noted, gait normal and normal muscle tone  Skin: no suspicious lesions or rashes  Neurologic: Gait normal. Reflexes normal and symmetric. Sensation grossly WNL.  Psychiatric: mentation appears normal and affect normal/bright  Hematologic/Lymphatic/Immunologic: Normal cervical lymph nodes      A: left thorax pain of uncertain etiology: costochondritis versus musculskeletal pull versus colitis  P: recommend follow up with primary care if not improved  Chelsi Hay      Again, thank you for allowing me to participate in the care of your patient.      Sincerely,    Chelsi Hay MD

## 2017-03-17 NOTE — PROGRESS NOTES
"S: 55 yo PM woman presents with concerns for left rib pain. States it is worse with lying on left side. Also new diagnosis of colitis viral versus ulcerative and is worried inflammation may be making everything worse including this pain.   Patient Active Problem List   Diagnosis     Anemia due to acute blood loss     Mild intermittent asthma     Lipoma of axilla -- left     Cyst of breast, right, solitary     Stiffness of joint, hand     Anemia     S/P total hysterectomy and bilateral salpingo-oophorectomy     Fibrocystic breast changes     Other anterior corneal dystrophies     Past Medical History   Diagnosis Date     Abnormal stools 2013     Anemia      \"Related to heavy periods\"     Complex endometrial hyperplasia with atypia 10/17/2013     Path dx w complete hysterectomy.      Kidney cysts      left seen on CT     Macular degeneration      Taking vitamin.     Menarche      cycles q 28 x 5 d     Menorrhagia 2007     Oct 2009 ->anemia to 7+ gms.  Resolved with OTC po FE 50 mg/d      Precancerous skin lesion      excised -- abdomen. ->yrly Derm cks     S/P total hysterectomy and bilateral salpingo-oophorectomy 10/17/2013     Path: -->Complex endometrial hyperplasia with atypia.      Swelling of the ankle, feet, or leg      Ankles     Unspecified hemorrhoids without mention of complication 2007     bleeding to anemia -- hgb 8.4 and lower....     Uterine leiomyoma 2009     [place] diagnosis while [place] 8/10/2011 US in this EMR confirms multiple myomas with a 12 mm endometrial stripe.  (Problem list name updated by automated process. Provider to review and confirm*     Past Surgical History   Procedure Laterality Date     Colonoscopy       virtual     Appendectomy open  age 3      section   and      C lap,surg,colectomy, partial, w/anast  age 3     tumor in colon as child (removed tumor and appendix)     Laparoscopy diagnostic (gyn)       ectopic excision     Colonoscopy " " 2013     Procedure: COLONOSCOPY;  COLONOSCOPY;  Surgeon: Db Reed MD;  Location:  GI     Dilation and curettage, operative hysteroscopy with morcellator, combined  2013     Procedure: COMBINED DILATION AND CURETTAGE, OPERATIVE HYSTEROSCOPY WITH MORCELLATOR;  Dilation and Curettage, Hysteroscopy, Hysteroscopic  Polypectomy with Myosure ;  Surgeon: Concepción Gamboa MD;  [facility]     Davinci hysterectomy total, bilateral salpingo-oophorectomy, combined  10/17/2013     Procedure: COMBINED DAVINCI HYSTERECTOMY TOTAL, SALPINGO-OOPHORECTOMY;  DaVinci Assisted Total Laparoscopic Hysterectomy/Bilateral Salpingo Oophorectomy Cystoscopy  ;  Surgeon: Zoya Vance MD;  [facility]: [facility]     Colonoscopy N/A 3/14/2017     Procedure: COMBINED COLONOSCOPY, SINGLE OR MULTIPLE BIOPSY/POLYPECTOMY BY BIOPSY;  Surgeon: Db Reed MD;  Location:  GI     Obstetric History       T2      TAB0   SAB2   E1   M0   L2       # Outcome Date GA Lbr Lenard/2nd Weight Sex Delivery Anes PTL Lv   5 Term 10/26/97 38w0d 21:00 3.7 kg (8 lb 2.5 oz) M CS-LTranv   LIVE BIRTH      Name: Christopher   4 Term 92 37w0d 26:00 3.6 kg (7 lb 15 oz) F CS-LTranv   LIVE BIRTH      Name: Keren   3 Ectopic            2             1                  /69  Pulse 74  Ht 1.626 m (5' 4\")  Wt 63.1 kg (139 lb 1.6 oz)  LMP 2013  BMI 23.88 kg/m2  Exam:  Constitutional: healthy, alert and mild distress  Head:   Neck: Neck supple. No adenopathy. Thyroid symmetric, normal size,, Carotids without bruits.  ENT:   Cardiovascular: negative, PMI normal. No lifts, heaves, or thrills. RRR. No murmurs, clicks gallops or rub  Respiratory: negative, Percussion normal. Good diaphragmatic excursion. Lungs clear  Chest: no axillary lymphadenopathy bilaterally. Ribs without pain with palpation or movement. Sternum nontender.   Gastrointestinal: Abdomen soft, non-tender. BS normal. No masses, " organomegaly  : Deferred  Musculoskeletal: extremities normal- no gross deformities noted, gait normal and normal muscle tone  Skin: no suspicious lesions or rashes  Neurologic: Gait normal. Reflexes normal and symmetric. Sensation grossly WNL.  Psychiatric: mentation appears normal and affect normal/bright  Hematologic/Lymphatic/Immunologic: Normal cervical lymph nodes      A: left thorax pain of uncertain etiology: costochondritis versus musculskeletal pull versus colitis  P: recommend follow up with primary care if not improved  Chelsi Hay

## 2017-03-28 ENCOUNTER — MYC MEDICAL ADVICE (OUTPATIENT)
Dept: OBGYN | Facility: CLINIC | Age: 57
End: 2017-03-28

## 2017-03-28 DIAGNOSIS — K52.9 COLITIS: Primary | ICD-10-CM

## 2017-03-30 ENCOUNTER — HOSPITAL ENCOUNTER (OUTPATIENT)
Dept: LAB | Facility: CLINIC | Age: 57
Discharge: HOME | End: 2017-03-30
Attending: OBSTETRICS & GYNECOLOGY | Admitting: OBSTETRICS & GYNECOLOGY
Payer: COMMERCIAL

## 2017-03-30 DIAGNOSIS — K52.9 COLITIS: ICD-10-CM

## 2017-03-30 LAB
ALBUMIN SERPL-MCNC: 3.6 G/DL (ref 3.4–5)
ALP SERPL-CCNC: 74 U/L (ref 40–150)
ALT SERPL W P-5'-P-CCNC: 19 U/L (ref 0–50)
AST SERPL W P-5'-P-CCNC: 14 U/L (ref 0–45)
BILIRUB DIRECT SERPL-MCNC: 0.1 MG/DL (ref 0–0.2)
BILIRUB SERPL-MCNC: 0.3 MG/DL (ref 0.2–1.3)
ERYTHROCYTE [SEDIMENTATION RATE] IN BLOOD BY WESTERGREN METHOD: 12 MM/H (ref 0–30)
PROT SERPL-MCNC: 7.4 G/DL (ref 6.8–8.8)

## 2017-03-30 PROCEDURE — 85652 RBC SED RATE AUTOMATED: CPT | Performed by: OBSTETRICS & GYNECOLOGY

## 2017-03-30 PROCEDURE — 36415 COLL VENOUS BLD VENIPUNCTURE: CPT | Performed by: OBSTETRICS & GYNECOLOGY

## 2017-03-30 PROCEDURE — 80076 HEPATIC FUNCTION PANEL: CPT | Performed by: OBSTETRICS & GYNECOLOGY

## 2017-04-06 ENCOUNTER — HOSPITAL ENCOUNTER (OUTPATIENT)
Dept: LAB | Facility: CLINIC | Age: 57
Discharge: HOME | End: 2017-04-06
Attending: OBSTETRICS & GYNECOLOGY | Admitting: OBSTETRICS & GYNECOLOGY
Payer: COMMERCIAL

## 2017-04-06 DIAGNOSIS — Z78.0 MENOPAUSE PRESENT: ICD-10-CM

## 2017-04-06 DIAGNOSIS — D50.8 IRON DEFICIENCY ANEMIA SECONDARY TO INADEQUATE DIETARY IRON INTAKE: ICD-10-CM

## 2017-04-06 LAB
ALBUMIN SERPL-MCNC: 3.7 G/DL (ref 3.4–5)
ALP SERPL-CCNC: 83 U/L (ref 40–150)
ALT SERPL W P-5'-P-CCNC: 21 U/L (ref 0–50)
ANION GAP SERPL CALCULATED.3IONS-SCNC: 6 MMOL/L (ref 3–14)
AST SERPL W P-5'-P-CCNC: 14 U/L (ref 0–45)
BASOPHILS # BLD AUTO: 0 10E9/L (ref 0–0.2)
BASOPHILS NFR BLD AUTO: 0.3 %
BILIRUB SERPL-MCNC: 0.4 MG/DL (ref 0.2–1.3)
BUN SERPL-MCNC: 16 MG/DL (ref 7–30)
CALCIUM SERPL-MCNC: 8.8 MG/DL (ref 8.5–10.1)
CHLORIDE SERPL-SCNC: 105 MMOL/L (ref 94–109)
CO2 SERPL-SCNC: 31 MMOL/L (ref 20–32)
CREAT SERPL-MCNC: 0.68 MG/DL (ref 0.52–1.04)
DIFFERENTIAL METHOD BLD: NORMAL
EOSINOPHIL # BLD AUTO: 0.1 10E9/L (ref 0–0.7)
EOSINOPHIL NFR BLD AUTO: 1 %
ERYTHROCYTE [DISTWIDTH] IN BLOOD BY AUTOMATED COUNT: 12.9 % (ref 10–15)
GFR SERPL CREATININE-BSD FRML MDRD: 89 ML/MIN/1.7M2
GLUCOSE SERPL-MCNC: 96 MG/DL (ref 70–99)
HCT VFR BLD AUTO: 39.7 % (ref 35–47)
HGB BLD-MCNC: 13.4 G/DL (ref 11.7–15.7)
IMM GRANULOCYTES # BLD: 0 10E9/L (ref 0–0.4)
IMM GRANULOCYTES NFR BLD: 0 %
LYMPHOCYTES # BLD AUTO: 2.2 10E9/L (ref 0.8–5.3)
LYMPHOCYTES NFR BLD AUTO: 36 %
MCH RBC QN AUTO: 30.2 PG (ref 26.5–33)
MCHC RBC AUTO-ENTMCNC: 33.8 G/DL (ref 31.5–36.5)
MCV RBC AUTO: 90 FL (ref 78–100)
MONOCYTES # BLD AUTO: 0.4 10E9/L (ref 0–1.3)
MONOCYTES NFR BLD AUTO: 7.2 %
NEUTROPHILS # BLD AUTO: 3.3 10E9/L (ref 1.6–8.3)
NEUTROPHILS NFR BLD AUTO: 55.5 %
NRBC # BLD AUTO: 0 10*3/UL
NRBC BLD AUTO-RTO: 0 /100
PLATELET # BLD AUTO: 267 10E9/L (ref 150–450)
POTASSIUM SERPL-SCNC: 4.2 MMOL/L (ref 3.4–5.3)
PROT SERPL-MCNC: 7.3 G/DL (ref 6.8–8.8)
RBC # BLD AUTO: 4.43 10E12/L (ref 3.8–5.2)
SODIUM SERPL-SCNC: 142 MMOL/L (ref 133–144)
WBC # BLD AUTO: 6 10E9/L (ref 4–11)

## 2017-04-06 PROCEDURE — 36415 COLL VENOUS BLD VENIPUNCTURE: CPT | Performed by: OBSTETRICS & GYNECOLOGY

## 2017-04-06 PROCEDURE — 80053 COMPREHEN METABOLIC PANEL: CPT | Performed by: OBSTETRICS & GYNECOLOGY

## 2017-04-06 PROCEDURE — 82306 VITAMIN D 25 HYDROXY: CPT | Performed by: OBSTETRICS & GYNECOLOGY

## 2017-04-06 PROCEDURE — 85025 COMPLETE CBC W/AUTO DIFF WBC: CPT | Performed by: OBSTETRICS & GYNECOLOGY

## 2017-04-07 LAB — DEPRECATED CALCIDIOL+CALCIFEROL SERPL-MC: 34 UG/L (ref 20–75)

## 2017-04-12 ENCOUNTER — TELEPHONE (OUTPATIENT)
Dept: FAMILY MEDICINE | Facility: CLINIC | Age: 57
End: 2017-04-12

## 2017-04-12 ENCOUNTER — TRANSFERRED RECORDS (OUTPATIENT)
Dept: HEALTH INFORMATION MANAGEMENT | Facility: CLINIC | Age: 57
End: 2017-04-12

## 2017-04-12 NOTE — TELEPHONE ENCOUNTER
Reason for Call:  Other appointment and call back    Detailed comments: pt is wondering if dr. Santoyo will take her on as a a new patient. Pt was recommended by dr. Dailey from rectal surgery specialty    Phone Number Patient can be reached at: Home number on file [phone] (home)    Best Time: anytime    Can we leave a detailed message on this number? YES    Call taken on 4/12/2017 at 5:06 PM by Christal Chamberlain

## 2017-04-25 ENCOUNTER — OFFICE VISIT (OUTPATIENT)
Dept: FAMILY MEDICINE | Facility: CLINIC | Age: 57
End: 2017-04-25
Payer: COMMERCIAL

## 2017-04-25 ENCOUNTER — RADIANT APPOINTMENT (OUTPATIENT)
Dept: GENERAL RADIOLOGY | Facility: CLINIC | Age: 57
End: 2017-04-25
Attending: INTERNAL MEDICINE
Payer: COMMERCIAL

## 2017-04-25 DIAGNOSIS — R10.84 ABDOMINAL PAIN, GENERALIZED: ICD-10-CM

## 2017-04-25 DIAGNOSIS — R06.02 SOB (SHORTNESS OF BREATH): ICD-10-CM

## 2017-04-25 DIAGNOSIS — R07.89 OTHER CHEST PAIN: ICD-10-CM

## 2017-04-25 DIAGNOSIS — R19.7 DIARRHEA, UNSPECIFIED TYPE: ICD-10-CM

## 2017-04-25 DIAGNOSIS — R11.0 NAUSEA: Primary | ICD-10-CM

## 2017-04-25 PROCEDURE — 99215 OFFICE O/P EST HI 40 MIN: CPT | Performed by: INTERNAL MEDICINE

## 2017-04-25 PROCEDURE — 86140 C-REACTIVE PROTEIN: CPT | Performed by: INTERNAL MEDICINE

## 2017-04-25 PROCEDURE — 71020 XR CHEST 2 VW: CPT

## 2017-04-25 PROCEDURE — 36415 COLL VENOUS BLD VENIPUNCTURE: CPT | Performed by: INTERNAL MEDICINE

## 2017-04-25 PROCEDURE — 85379 FIBRIN DEGRADATION QUANT: CPT | Performed by: INTERNAL MEDICINE

## 2017-04-25 NOTE — PATIENT INSTRUCTIONS
I will have the heart clinic call you to do the stress test.    I will send you all the results from today    Mahesh Santoyo M.D.

## 2017-04-25 NOTE — PROGRESS NOTES
"Katie Hernandez is a 57 year old female who presents as new patient to me and this clinic.  Referred by Dr. Angelina Dailey for lots of symptoms.    Most have been present for long time but worse since Jan.  She notes nausea off and on, worse with fats or red meat, abdominal pains come and go in diff areas, lumps in breast with gyn eval and nothing found, now come and go, edema, chills, chest pain and shortness of breath.    She notes the gi c/o for long time, due to that had ct as noted Jan and ?colitis and then colonoscopy and bx done 3/17 and all normal.  Since continues to have intermittent diarrhea, every 10 days or so, more when eating fatty food or pork, when has it is loose, 2x, no b/b stools.  NO weight loss, sometimes has ns, no gerd or dysphagia.  She also notes itchy elbows for one year, come and go, no rashes.  For few years chest pain off and on, no pattern, shortness of breath at times, dizzy at ties.  No h/o cv dz.  No syncope.  She also has intermittent edema, not new.      She does admit to fair amt of stress, work, daughter dx with depression around time this all worsened.  As noted ct done and does have gallstones.      She also has intermittent pains ax area, come and go, no masses.  Up to date mammogram.               Past Medical History:      Past Medical History:   Diagnosis Date     Abnormal stools 5/30/2013     Anemia     \"Related to heavy periods\"     Complex endometrial hyperplasia with atypia 10/17/2013    Path dx w complete hysterectomy.      H/O colonoscopy 03/2017    nl, bx nl     Kidney cysts     left seen on CT     Macular degeneration 2015    Taking vitamin.     Menarche     cycles q 28 x 5 d     Menorrhagia 5/16/2007    Oct 2009 ->anemia to 7+ gms.  Resolved with OTC po FE 50 mg/d      Precancerous skin lesion     excised -- abdomen. ->yrly Derm cks     S/P total hysterectomy and bilateral salpingo-oophorectomy 10/17/2013    Path: -->Complex endometrial hyperplasia with atypia.      " Swelling of the ankle, feet, or leg     Ankles     Unspecified hemorrhoids without mention of complication 2007    bleeding to anemia -- hgb 8.4 and lower....     Uterine leiomyoma 2009    [place] diagnosis [place] 8/10/2011 US in this EMR confirms multiple myomas with a 12 mm endometrial stripe.  (Problem list name updated by automated process. Provider to review and confirm*             Past Surgical History:      Past Surgical History:   Procedure Laterality Date     APPENDECTOMY OPEN  age 3     C LAP,SURG,COLECTOMY, PARTIAL, W/ANAST  age 3    tumor in colon as child (removed tumor and appendix)      SECTION   and      COLONOSCOPY      virtual     COLONOSCOPY  2013    Procedure: COLONOSCOPY;  COLONOSCOPY;  Surgeon: Db Reed MD;  Location:  GI     COLONOSCOPY N/A 3/14/2017    Procedure: COMBINED COLONOSCOPY, SINGLE OR MULTIPLE BIOPSY/POLYPECTOMY BY BIOPSY;  Surgeon: Db Reed MD;  Location:  GI     DAVINCI HYSTERECTOMY TOTAL, BILATERAL SALPINGO-OOPHORECTOMY, COMBINED  10/17/2013    Procedure: COMBINED DAVINCI HYSTERECTOMY TOTAL, SALPINGO-OOPHORECTOMY;  DaVinci Assisted Total Laparoscopic Hysterectomy/Bilateral Salpingo Oophorectomy Cystoscopy  ;  Surgeon: Zoya Vance MD;  Location: [facility] OR     DILATION AND CURETTAGE, OPERATIVE HYSTEROSCOPY WITH MORCELLATOR, COMBINED  2013    Procedure: COMBINED DILATION AND CURETTAGE, OPERATIVE HYSTEROSCOPY WITH MORCELLATOR;  Dilation and Curettage, Hysteroscopy, Hysteroscopic  Polypectomy with Myosure ;  Surgeon: Concepción Gamboa MD;  Location: [facility] OR     LAPAROSCOPY DIAGNOSTIC (GYN)      ectopic excision             Social History:     Social History     Social History     Marital status:      Spouse name: N/A     Number of children: 2     Years of education: N/A     Occupational History     Faculty [employer]     [employer]     Social History Main Topics     Smoking status:  "Never Smoker     Smokeless tobacco: Never Used     Alcohol use Yes      Comment: 1/2 d / mo     Drug use: No     Sexual activity: Yes     Partners: Male     Birth control/ protection: Surgical      Comment: Vastectomy     Other Topics Concern     Blood Transfusions No     Caffeine Concern No     Stopped all caffiene 2nd to breast pain     Occupational Exposure No     Hobby Hazards No     Sleep Concern No     Stress Concern No     Weight Concern No     Special Diet No     Back Care No     Exercise No     45' aerobic 3xs/wk     Bike Helmet No     Seat Belt No     Social History Narrative             Family History:   reviewed         Allergies:     Allergies   Allergen Reactions     Hydrocodone Other (See Comments)     Pt fainted     Penicillins Hives     Doxycycline Other (See Comments)     Feeling of bugs crawling on skin and tinnitus     Hibiclens      Itching after preop washing.             Medications:     Current Outpatient Prescriptions   Medication Sig Dispense Refill     Multiple Vitamins-Minerals (VITEYES AREDS FORMULA/LUTEIN) CAPS Take 1 tablet by mouth daily       magnesium 100 MG CAPS Take by mouth daily       FISH OIL daily       Calcium Carbonate-Vitamin D (CALCIUM + D PO)        Coenzyme Q10 (CO Q-10 PO) Take 1 capsule by mouth daily       Ascorbic Acid (VITAMIN C CR PO) Take 1 tablet by mouth daily.                 Review of Systems:   The 10 point Review of Systems is negative other than noted in the HPI           Physical Exam:   Blood pressure (P) 104/60, pulse (P) 70, temperature (P) 97.8  F (36.6  C), temperature source (P) Oral, height (P) 5' 4\" (1.626 m), weight (P) 139 lb 14.4 oz (63.5 kg), last menstrual period 08/20/2013, SpO2 (P) 98 %, not currently breastfeeding.    Exam:  Constitutional: healthy appearing, alert and in no distress  Heent: Normocephalic. Head without obvious masses or lesions. PERRLDC, EOMI. Mouth exam within normal limits: tongue, mucous membranes, posterior pharynx all " normal, no lesions or abnormalities seen.  Tm's and canals within normal limits bilaterally. Neck supple, no nuchal rigidity or masses. No supraclavicular, or cervical adenopathy. Thyroid symmetric, no masses.  Cardiovascular: Regular rate and rhythm, no murmer, rub or gallops.  JVP not elevated, no edema.  Carotids within normal limits bilaterally, no bruits.  Respiratory: Normal respiratory effort.  Lungs clear, normal flow, no wheezing or crackles.  Gastrointestinal: Normal active bowel sounds.   Soft, not tender, no masses, guarding or rebound.  No hepatosplenomegaly.   Musculoskeletal: extremities normal, no gross deformities noted.  Skin: no suspicious lesions or rashes   Neurologic: Mental status within normal limits.  Speech fluent.  No gross motor abnormalities and gait intact.  Psychiatric: mentation appears normal and affect normal.         Data:   Labs sent; cxr to be done, stress test ordered        Assessment:   1. Gi c/o, nausea, abdomen pain and diarrhea.  Other then colitis on ct and gallstones nothing to explain this, and patient had colonoscopy and bx so does not have colitis.  I suspect this is ibs, labs fine doubt pancreatitis, doubt colon cancer, lymphoma, celiac, pud.  I d/w patient further eval to include egd and surgery consult, she wants to consider for now  2. Chest pain and shortness of breath, based on history I doubt cv cause or pulmonary embolis, doubt other lung dz.  Will check cxr and d dimer  3. Diarrhea, suspect ibs, doubt other causes as noted  4. Night sweats, suspect stress, menopause, not other cause, ca, vasculitis, tb  5. Ax pain, neg exam, doubt malig cause, lymphoma, breast ca           Plan:   D/w patient at length, also d/w patient the possibility of stress or anxiety as cause, she does admit has some.    Will get labs today, cxr and est.  I d/w patient considering surgery consult and egd as well.  She is already taking natural fiber.  I d/w patient trial of ssri to see  if this had positive effect on sympotms.      Will start with labs, cxr and est, and if normal I would recommend surgery eval, egd and if normal ssri    Mahesh Santoyo M.D.

## 2017-04-25 NOTE — MR AVS SNAPSHOT
After Visit Summary   4/25/2017    Katie Hernandez    MRN: [id number]           Patient Information     Date Of Birth          1960        Visit Information        Provider Department      4/25/2017 4:00 PM Mahesh Santoyo MD [facility]        Today's Diagnoses     Nausea    -  1    Abdominal pain, generalized        Other chest pain        SOB (shortness of breath)        Diarrhea, unspecified type          Care Instructions    I will have the heart clinic call you to do the stress test.    I will send you all the results from today    Mahesh Santoyo M.D.          Follow-ups after your visit        Future tests that were ordered for you today     Open Future Orders        Priority Expected Expires Ordered    Exercise Stress Echocardiogram Routine  4/25/2018 4/25/2017    XR Chest 2 Views Routine 4/25/2017 4/25/2018 4/25/2017            Who to contact     If you have questions or need follow up information about today's clinic visit or your schedule please contact [facility] directly at [phone].  Normal or non-critical lab and imaging results will be communicated to you by [employer] Media[employer], letter or phone within 4 business days after the clinic has received the results. If you do not hear from us within 7 days, please contact the clinic through [employer] or phone. If you have a critical or abnormal lab result, we will notify you by phone as soon as possible.  Submit refill requests through [employer]XT or call your pharmacy and they will forward the refill request to us. Please allow 3 business days for your refill to be completed.          Additional Information About Your Visit        Inv[employer] Information     [employer] gives you secure access to your electronic health record. If you see a primary care provider, you can also send messages to your care team and make appointments. If you have questions, please call your primary care clinic.  If you do not have a primary care  provider, please call [phone] and they will assist you.        Care EveryWhere ID     This is your Care EveryWhere ID. This could be used by other organizations to access your [place] medical records  [id number]        Your Vitals Were     Last Period Breastfeeding?                08/20/2013 No           Blood Pressure from Last 3 Encounters:   04/25/17 (P) 104/60   03/16/17 103/69   03/14/17 108/74    Weight from Last 3 Encounters:   04/25/17 (P) 139 lb 14.4 oz (63.5 kg)   03/16/17 139 lb 1.6 oz (63.1 kg)   03/02/17 140 lb (63.5 kg)              We Performed the Following     CRP, inflammation     D dimer, quantitative        Primary Care Provider Office Phone # Fax #    Genevieve Terrance Leong [phone]       [facility] [address] [facility] [address]        Thank you!     Thank you for choosing [facility]  for your care. Our goal is always to provide you with excellent care. Hearing back from our patients is one way we can continue to improve our services. Please take a few minutes to complete the written survey that you may receive in the mail after your visit with us. Thank you!             Your Updated Medication List - Protect others around you: Learn how to safely use, store and throw away your medicines at www.disposemymeds.org.          This list is accurate as of: 4/25/17  4:29 PM.  Always use your most recent med list.                   Brand Name Dispense Instructions for use    CALCIUM + D PO          CO Q-10 PO      Take 1 capsule by mouth daily       FISH OIL      daily       magnesium 100 MG Caps      Take by mouth daily       VITAMIN C CR PO      Take 1 tablet by mouth daily.       VITEYES AREDS FORMULA/LUTEIN Caps      Take 1 tablet by mouth daily

## 2017-04-25 NOTE — NURSING NOTE
"Chief Complaint   Patient presents with     Establish Care     referred by GI     Gastrointestinal Problem     has idopathic colitis, still experiencing abdominal pain, nausea and inflamed lymph nodes       Initial BP (P) 104/60 (BP Location: Right arm, Patient Position: Chair, Cuff Size: Adult Regular)  Pulse (P) 70  Temp (P) 97.8  F (36.6  C) (Oral)  Ht (P) 5' 4\" (1.626 m)  Wt (P) 139 lb 14.4 oz (63.5 kg)  LMP 08/20/2013  SpO2 (P) 98%  Breastfeeding? No  BMI (P) 24.01 kg/m2 Estimated body mass index is 24.01 kg/(m^2) (pended) as calculated from the following:    Height as of this encounter: (P) 5' 4\" (1.626 m).    Weight as of this encounter: (P) 139 lb 14.4 oz (63.5 kg).  Medication Reconciliation: complete     Alexander Ramirez, LEIGHANN     "

## 2017-04-26 ENCOUNTER — TELEPHONE (OUTPATIENT)
Dept: FAMILY MEDICINE | Facility: CLINIC | Age: 57
End: 2017-04-26

## 2017-04-26 DIAGNOSIS — R79.89 ELEVATED D-DIMER: Primary | ICD-10-CM

## 2017-04-26 DIAGNOSIS — R06.02 SOB (SHORTNESS OF BREATH): ICD-10-CM

## 2017-04-26 LAB
CRP SERPL-MCNC: <2.9 MG/L (ref 0–8)
D DIMER PPP FEU-MCNC: 11 UG/ML FEU (ref 0–0.5)

## 2017-04-27 ENCOUNTER — MYC MEDICAL ADVICE (OUTPATIENT)
Dept: FAMILY MEDICINE | Facility: CLINIC | Age: 57
End: 2017-04-27

## 2017-04-28 ENCOUNTER — HOSPITAL ENCOUNTER (OUTPATIENT)
Dept: CT IMAGING | Facility: CLINIC | Age: 57
Discharge: HOME | End: 2017-04-28
Attending: INTERNAL MEDICINE | Admitting: INTERNAL MEDICINE
Payer: COMMERCIAL

## 2017-04-28 DIAGNOSIS — R79.89 ELEVATED D-DIMER: ICD-10-CM

## 2017-04-28 DIAGNOSIS — R06.02 SOB (SHORTNESS OF BREATH): ICD-10-CM

## 2017-04-28 PROCEDURE — 40000863 ZZH STATISTIC RADIOLOGY XRAY, US, CT, MAR, NM

## 2017-04-28 PROCEDURE — 25500064 ZZH RX 255 OP 636: Performed by: INTERNAL MEDICINE

## 2017-04-28 PROCEDURE — 71260 CT THORAX DX C+: CPT

## 2017-04-28 PROCEDURE — 25000125 ZZHC RX 250: Performed by: INTERNAL MEDICINE

## 2017-04-28 RX ORDER — IOPAMIDOL 755 MG/ML
58 INJECTION, SOLUTION INTRAVASCULAR ONCE
Status: COMPLETED | OUTPATIENT
Start: 2017-04-28 | End: 2017-04-28

## 2017-04-28 RX ADMIN — SODIUM CHLORIDE 84 ML: 9 INJECTION, SOLUTION INTRAVENOUS at 08:40

## 2017-04-28 RX ADMIN — IOPAMIDOL 58 ML: 755 INJECTION, SOLUTION INTRAVENOUS at 08:40

## 2017-04-28 NOTE — PROGRESS NOTES
Hello there,    Very good news, no blood clots, tumors or lung masses.  The atelectasis or fibrosis is just either that either the lungs did not fully inflate when you did the scan or there is a bit of scarring, but either way this should not cause any of your symptoms.    I do not see a recent mammogram.  Has this been done.  If not I would do it.    I will let you know the stress test results once back.    Mahesh Santoyo M.D.

## 2017-05-01 ENCOUNTER — TELEPHONE (OUTPATIENT)
Dept: OBGYN | Facility: CLINIC | Age: 57
End: 2017-05-01

## 2017-05-01 ENCOUNTER — HOSPITAL ENCOUNTER (OUTPATIENT)
Dept: MAMMOGRAPHY | Facility: CLINIC | Age: 57
End: 2017-05-01
Attending: OBSTETRICS & GYNECOLOGY
Payer: COMMERCIAL

## 2017-05-01 DIAGNOSIS — N64.4 BREAST PAIN: Primary | ICD-10-CM

## 2017-05-01 DIAGNOSIS — N64.4 BREAST PAIN: ICD-10-CM

## 2017-05-01 PROCEDURE — G0204 DX MAMMO INCL CAD BI: HCPCS

## 2017-05-01 PROCEDURE — 76642 ULTRASOUND BREAST LIMITED: CPT | Mod: 50

## 2017-05-01 NOTE — TELEPHONE ENCOUNTER
Spoke to Kaite who is getting her mammogram today and hoping to get bilateral US as well.  She believes Dr Butcher just forgot to order the US because she saw her for this bilateral axillary pain she has been getting.  She also can feel cysts in both breast.    Informed her I will put in order for bilateral breast US,but cannot speak to her question if they can do both tests today. She will need to schedule US if there is only time for her mammogram to be done today.Pt indicated understanding and agreed with plan.

## 2017-05-01 NOTE — TELEPHONE ENCOUNTER
----- Message from Sharri Dumont sent at 5/1/2017  9:10 AM CDT -----  Regarding: Breast ultrasound   Contact: [phone]  Patient is requesting a breast ultrasound to be done this week, preferably at [facility]. She is requesting orders for the ultrasound. She is going on vacation this Saturday, 5/6, and would like to get the ultrasound done before then. She is scheduling her mammogram at [facility] also. Please contact patient at [phone].    Thank you!  Rika    Call Center

## 2017-05-03 ENCOUNTER — HOSPITAL ENCOUNTER (OUTPATIENT)
Dept: CARDIOLOGY | Facility: CLINIC | Age: 57
Discharge: HOME | End: 2017-05-03
Attending: INTERNAL MEDICINE | Admitting: INTERNAL MEDICINE
Payer: COMMERCIAL

## 2017-05-03 DIAGNOSIS — R06.02 SOB (SHORTNESS OF BREATH): ICD-10-CM

## 2017-05-03 DIAGNOSIS — R07.89 OTHER CHEST PAIN: ICD-10-CM

## 2017-05-03 PROCEDURE — 93325 DOPPLER ECHO COLOR FLOW MAPG: CPT | Mod: TC

## 2017-05-03 PROCEDURE — 93018 CV STRESS TEST I&R ONLY: CPT | Performed by: INTERNAL MEDICINE

## 2017-05-03 PROCEDURE — 93016 CV STRESS TEST SUPVJ ONLY: CPT | Performed by: INTERNAL MEDICINE

## 2017-05-03 PROCEDURE — 93350 STRESS TTE ONLY: CPT | Mod: 26 | Performed by: INTERNAL MEDICINE

## 2017-05-03 PROCEDURE — 93325 DOPPLER ECHO COLOR FLOW MAPG: CPT | Mod: 26 | Performed by: INTERNAL MEDICINE

## 2017-05-03 PROCEDURE — 93321 DOPPLER ECHO F-UP/LMTD STD: CPT | Mod: 26 | Performed by: INTERNAL MEDICINE

## 2017-05-03 NOTE — PROGRESS NOTES
Hello,    As you probably know your heart looks super.  The other 2 things we discussed doing are the upper gi endoscopy and surgery consult.  What do you think about doing those 2 things?    Mahesh Santoyo M.D.

## 2017-06-19 ENCOUNTER — OFFICE VISIT (OUTPATIENT)
Dept: FAMILY MEDICINE | Facility: CLINIC | Age: 57
End: 2017-06-19
Payer: COMMERCIAL

## 2017-06-19 VITALS
HEART RATE: 71 BPM | HEIGHT: 64 IN | WEIGHT: 139 LBS | DIASTOLIC BLOOD PRESSURE: 64 MMHG | OXYGEN SATURATION: 99 % | BODY MASS INDEX: 23.73 KG/M2 | TEMPERATURE: 97.6 F | SYSTOLIC BLOOD PRESSURE: 112 MMHG

## 2017-06-19 DIAGNOSIS — R10.13 DYSPEPSIA: Primary | ICD-10-CM

## 2017-06-19 DIAGNOSIS — Z11.59 NEED FOR HEPATITIS C SCREENING TEST: ICD-10-CM

## 2017-06-19 DIAGNOSIS — R07.89 ATYPICAL CHEST PAIN: ICD-10-CM

## 2017-06-19 PROCEDURE — 83516 IMMUNOASSAY NONANTIBODY: CPT | Performed by: INTERNAL MEDICINE

## 2017-06-19 PROCEDURE — 36415 COLL VENOUS BLD VENIPUNCTURE: CPT | Performed by: INTERNAL MEDICINE

## 2017-06-19 PROCEDURE — 82784 ASSAY IGA/IGD/IGG/IGM EACH: CPT | Performed by: INTERNAL MEDICINE

## 2017-06-19 PROCEDURE — 99213 OFFICE O/P EST LOW 20 MIN: CPT | Performed by: INTERNAL MEDICINE

## 2017-06-19 PROCEDURE — 86803 HEPATITIS C AB TEST: CPT | Performed by: INTERNAL MEDICINE

## 2017-06-19 PROCEDURE — 83516 IMMUNOASSAY NONANTIBODY: CPT | Mod: 59 | Performed by: INTERNAL MEDICINE

## 2017-06-19 NOTE — MR AVS SNAPSHOT
"              After Visit Summary   6/19/2017    Katie Hernandez    MRN: [id number]           Patient Information     Date Of Birth          1960        Visit Information        Provider Department      6/19/2017 11:30 AM Mahesh Santoyo MD [facility] [place]        Today's Diagnoses     Dyspepsia    -  1    Atypical chest pain        Need for hepatitis C screening test           Follow-ups after your visit        Who to contact     If you have questions or need follow up information about today's clinic visit or your schedule please contact [facility] directly at [phone].  Normal or non-critical lab and imaging results will be communicated to you by [employer]ralhart, letter or phone within 4 business days after the clinic has received the results. If you do not hear from us within 7 days, please contact the clinic through [employer] or phone. If you have a critical or abnormal lab result, we will notify you by phone as soon as possible.  Submit refill requests through [employer] or call your pharmacy and they will forward the refill request to us. Please allow 3 business days for your refill to be completed.          Additional Information About Your Visit        MyChart Information     [employer] gives you secure access to your electronic health record. If you see a primary care provider, you can also send messages to your care team and make appointments. If you have questions, please call your primary care clinic.  If you do not have a primary care provider, please call [phone] and they will assist you.        Care EveryWhere ID     This is your Care EveryWhere ID. This could be used by other organizations to access your [place] medical records  [id number]        Your Vitals Were     Pulse Temperature Height Last Period Pulse Oximetry Breastfeeding?    71 97.6  F (36.4  C) (Oral) 5' 4\" (1.626 m) 08/20/2013 99% No    BMI (Body Mass Index)                   23.86 kg/m2            Blood " Pressure from Last 3 Encounters:   06/19/17 112/64   04/25/17 (P) 104/60   03/16/17 103/69    Weight from Last 3 Encounters:   06/19/17 139 lb (63 kg)   04/25/17 (P) 139 lb 14.4 oz (63.5 kg)   03/16/17 139 lb 1.6 oz (63.1 kg)              We Performed the Following     Hepatitis C Screen Reflex to HCV RNA Quant and Genotype     IgA     Tissue transglutaminase william IgA and IgG        Primary Care Provider Office Phone # Fax #    Mahesh Remi Santoyo [phone]       [facility] [address] [facility] [address]  [facility] [address]        Thank you!     Thank you for choosing [facility]  for your care. Our goal is always to provide you with excellent care. Hearing back from our patients is one way we can continue to improve our services. Please take a few minutes to complete the written survey that you may receive in the mail after your visit with us. Thank you!             Your Updated Medication List - Protect others around you: Learn how to safely use, store and throw away your medicines at www.disposemymeds.org.          This list is accurate as of: 6/19/17 12:44 PM.  Always use your most recent med list.                   Brand Name Dispense Instructions for use    CALCIUM + D PO          CO Q-10 PO      Take 1 capsule by mouth daily       FISH OIL      daily       magnesium 100 MG Caps      Take by mouth daily       VITAMIN C CR PO      Take 1 tablet by mouth daily.       VITEYES AREDS FORMULA/LUTEIN Caps      Take 1 tablet by mouth daily

## 2017-06-19 NOTE — NURSING NOTE
"Chief Complaint   Patient presents with     Colitis       Initial /64 (BP Location: Left arm, Patient Position: Chair, Cuff Size: Adult Regular)  Pulse 71  Temp 97.6  F (36.4  C) (Oral)  Ht 5' 4\" (1.626 m)  Wt 139 lb (63 kg)  LMP 08/20/2013  SpO2 99%  Breastfeeding? No  BMI 23.86 kg/m2 Estimated body mass index is 23.86 kg/(m^2) as calculated from the following:    Height as of this encounter: 5' 4\" (1.626 m).    Weight as of this encounter: 139 lb (63 kg).  Medication Reconciliation: complete.  Lesli Marrero CMA    "

## 2017-06-19 NOTE — PROGRESS NOTES
"Katie Hernandez is a 57 year old female who presents for follow up initial office visit for variety of issues.  Since last office visit is significantly better, just 2 episodes of gi upset and between has been fine.  As noted prior ct abdomen and pelvis done and area of colitis and gallstones, but otherwise neg, then colonoscopy and bx normal.      The patient has had gi c/o for long time, intermittent diarrhea every 10 days or so, loose 2x without b/b stools. No f,c,s or weight loss.      She also noted chest pain off and on for few years, occasionally shortness of breath, dizzy.      Since last office visit all symptoms are improved.  2 gi episodes as noted, no chest pain or shortness of breath.  Est echo normal as noted.  D dimer high so ct done and no significant findings.    Past Medical History:   Diagnosis Date     Abnormal stools 5/30/2013     Anemia     \"Related to heavy periods\"     Complex endometrial hyperplasia with atypia 10/17/2013    Path dx w complete hysterectomy.      H/O colonoscopy 03/2017    nl, bx nl     Kidney cysts     left seen on CT     Macular degeneration 2015    Taking vitamin.     Menarche     cycles q 28 x 5 d     Menorrhagia 5/16/2007    Oct 2009 ->anemia to 7+ gms.  Resolved with OTC po FE 50 mg/d      Precancerous skin lesion     excised -- abdomen. ->yrly Derm cks     S/P total hysterectomy and bilateral salpingo-oophorectomy 10/17/2013    Path: -->Complex endometrial hyperplasia with atypia.      SOB (shortness of breath) 04/2017    also elevated d dimer - ct no pe, some fibrosis or atelect, breast cysts, then est echo nl 4/17     Swelling of the ankle, feet, or leg     Ankles     Unspecified hemorrhoids without mention of complication 2007    bleeding to anemia -- hgb 8.4 and lower....     Uterine leiomyoma 5/13/2009    [place] diagnosis while in [place] 8/10/2011 US in this EMR confirms multiple myomas with a 12 mm endometrial stripe.  (Problem list name updated by automated " process. Provider to review and confirm*     Past Surgical History:   Procedure Laterality Date     APPENDECTOMY OPEN  age 3     C LAP,SURG,COLECTOMY, PARTIAL, W/ANAST  age 3    tumor in colon as child (removed tumor and appendix)      SECTION   and      COLONOSCOPY      virtual     COLONOSCOPY  2013    Procedure: COLONOSCOPY;  COLONOSCOPY;  Surgeon: Db Reed MD;  Location:  GI     COLONOSCOPY N/A 3/14/2017    Procedure: COMBINED COLONOSCOPY, SINGLE OR MULTIPLE BIOPSY/POLYPECTOMY BY BIOPSY;  Surgeon: Db Reed MD;  Location:  GI     DAVINCI HYSTERECTOMY TOTAL, BILATERAL SALPINGO-OOPHORECTOMY, COMBINED  10/17/2013    Procedure: COMBINED DAVINCI HYSTERECTOMY TOTAL, SALPINGO-OOPHORECTOMY;  DaVinci Assisted Total Laparoscopic Hysterectomy/Bilateral Salpingo Oophorectomy Cystoscopy  ;  Surgeon: Zoya Vance MD;  Location: [facility] OR     DILATION AND CURETTAGE, OPERATIVE HYSTEROSCOPY WITH MORCELLATOR, COMBINED  2013    Procedure: COMBINED DILATION AND CURETTAGE, OPERATIVE HYSTEROSCOPY WITH MORCELLATOR;  Dilation and Curettage, Hysteroscopy, Hysteroscopic  Polypectomy with Myosure ;  Surgeon: Concepción Gamboa MD;  Location: [facility] OR     LAPAROSCOPY DIAGNOSTIC (GYN)      ectopic excision     Social History     Social History     Marital status:      Spouse name: N/A     Number of children: 2     Years of education: N/A     Occupational History     Faculty [employer]     [employer]aches [employer]     Social History Main Topics     Smoking status: Never Smoker     Smokeless tobacco: Never Used     Alcohol use Yes      Comment: 1/2 d / mo     Drug use: No     Sexual activity: Yes     Partners: Male     Birth control/ protection: Surgical      Comment: Vastectomy     Other Topics Concern     Blood Transfusions No     Caffeine Concern No     Stopped all caffiene 2nd to breast pain     Occupational Exposure No     Hobby Hazards No     Sleep Concern No     Stress  "Concern No     Weight Concern No     Special Diet No     Back Care No     Exercise No     45' aerobic 3xs/wk     Bike Helmet No     Seat Belt No     Social History Narrative     Current Outpatient Prescriptions   Medication Sig Dispense Refill     Multiple Vitamins-Minerals (VITEYES AREDS FORMULA/LUTEIN) CAPS Take 1 tablet by mouth daily       magnesium 100 MG CAPS Take by mouth daily       FISH OIL daily       Calcium Carbonate-Vitamin D (CALCIUM + D PO)        Coenzyme Q10 (CO Q-10 PO) Take 1 capsule by mouth daily       Ascorbic Acid (VITAMIN C CR PO) Take 1 tablet by mouth daily.       Allergies   Allergen Reactions     Hydrocodone Other (See Comments)     Pt fainted     Penicillins Hives     Doxycycline Other (See Comments)     Feeling of bugs crawling on skin and tinnitus     Hibiclens      Itching after preop washing.     FAMILY HISTORY NOTED AND REVIEWED    REVIEW OF SYSTEMS: above    PHYSICAL EXAM    /64 (BP Location: Left arm, Patient Position: Chair, Cuff Size: Adult Regular)  Pulse 71  Temp 97.6  F (36.4  C) (Oral)  Ht 5' 4\" (1.626 m)  Wt 139 lb (63 kg)  LMP 08/20/2013  SpO2 99%  Breastfeeding? No  BMI 23.86 kg/m2    Patient appears non toxic  No exam but d/w patient approx 15 min    ASSESSMENT:  1 gi c/o, suspect ibs, ?gluten intol, doubt colitis, doubt gallstone, gerd  2. Chest pain, non cardiac    PLAN:  Check celiac studies  Cont fiber  Call if problems    Mahesh Santoyo M.D.        "

## 2017-06-20 LAB
HCV AB SERPL QL IA: NORMAL
IGA SERPL-MCNC: 101 MG/DL (ref 70–380)

## 2017-06-20 ASSESSMENT — ASTHMA QUESTIONNAIRES: ACT_TOTALSCORE: 25

## 2017-06-21 LAB
TTG IGA SER-ACNC: NORMAL U/ML
TTG IGG SER-ACNC: NORMAL U/ML

## 2017-06-21 NOTE — PROGRESS NOTES
It was a pleasure to see you.  I wanted to let you know your test results.  Please let me know if you are not able to view them.    Your blood tests for celiac are normal and you do not have hepatitis C.  If you have any questions please call me.    Mahesh Santoyo M.D.

## 2017-07-19 ENCOUNTER — OFFICE VISIT (OUTPATIENT)
Dept: FAMILY MEDICINE | Facility: CLINIC | Age: 57
End: 2017-07-19
Payer: COMMERCIAL

## 2017-07-19 VITALS
SYSTOLIC BLOOD PRESSURE: 94 MMHG | HEART RATE: 75 BPM | WEIGHT: 134 LBS | OXYGEN SATURATION: 98 % | TEMPERATURE: 98.8 F | DIASTOLIC BLOOD PRESSURE: 63 MMHG | BODY MASS INDEX: 22.88 KG/M2 | RESPIRATION RATE: 16 BRPM | HEIGHT: 64 IN

## 2017-07-19 DIAGNOSIS — L98.9 SKIN LESION: Primary | ICD-10-CM

## 2017-07-19 PROCEDURE — 99212 OFFICE O/P EST SF 10 MIN: CPT | Performed by: NURSE PRACTITIONER

## 2017-07-19 RX ORDER — CALCIUM CARBONATE 500(1250)
1 TABLET ORAL 2 TIMES DAILY
COMMUNITY
End: 2019-09-10

## 2017-07-19 NOTE — NURSING NOTE
"Chief Complaint   Patient presents with     Derm Problem       Initial BP 94/63 (BP Location: Right arm, Patient Position: Chair, Cuff Size: Adult Regular)  Pulse 75  Temp 98.8  F (37.1  C) (Tympanic)  Resp 16  Ht 5' 4\" (1.626 m)  Wt 134 lb (60.8 kg)  LMP 08/20/2013  SpO2 98%  BMI 23 kg/m2 Estimated body mass index is 23 kg/(m^2) as calculated from the following:    Height as of this encounter: 5' 4\" (1.626 m).    Weight as of this encounter: 134 lb (60.8 kg).  Medication Reconciliation: complete   Cecelia Harris CMA (AAMA)      "

## 2017-07-19 NOTE — MR AVS SNAPSHOT
After Visit Summary   7/19/2017    Katie Hernandez    MRN: [id number]           Patient Information     Date Of Birth          1960        Visit Information        Provider Department      7/19/2017 4:00 PM Nay Hess APRN CNP [facility]        Today's Diagnoses     Skin lesion    -  1      Care Instructions    Please follow up with Dr Chau on July 25 9:30           Follow-ups after your visit        Additional Services     DERMATOLOGY REFERRAL       Your provider has referred you to: [facility] [phone]   [email or web address]    Please be aware that coverage of these services is subject to the terms and limitations of your health insurance plan.  Call member services at your health plan with any benefit or coverage questions.      Please bring the following with you to your appointment:    (1) Any X-Rays, CTs or MRIs which have been performed.  Contact the facility where they were done to arrange for  prior to your scheduled appointment.    (2) List of current medications  (3) This referral request   (4) Any documents/labs given to you for this referral                  Who to contact     If you have questions or need follow up information about today's clinic visit or your schedule please contact [facility] directly at [phone].  Normal or non-critical lab and imaging results will be communicated to you by MyChart, letter or phone within 4 business days after the clinic has received the results. If you do not hear from us within 7 days, please contact the clinic through MyChart or phone. If you have a critical or abnormal lab result, we will notify you by phone as soon as possible.  Submit refill requests through [employer] or call your pharmacy and they will forward the refill request to us. Please allow 3 business days for your refill to be completed.          Additional Information About Your Visit       "  MyChart Information     [employer] gives you secure access to your electronic health record. If you see a primary care provider, you can also send messages to your care team and make appointments. If you have questions, please call your primary care clinic.  If you do not have a primary care provider, please call [phone] and they will assist you.        Care EveryWhere ID     This is your Care EveryWhere ID. This could be used by other organizations to access your [place] medical records  [id number]        Your Vitals Were     Pulse Temperature Respirations Height Last Period Pulse Oximetry    75 98.8  F (37.1  C) (Tympanic) 16 5' 4\" (1.626 m) 08/20/2013 98%    BMI (Body Mass Index)                   23 kg/m2            Blood Pressure from Last 3 Encounters:   07/19/17 94/63   06/19/17 112/64   04/25/17 (P) 104/60    Weight from Last 3 Encounters:   07/19/17 134 lb (60.8 kg)   06/19/17 139 lb (63 kg)   04/25/17 (P) 139 lb 14.4 oz (63.5 kg)              We Performed the Following     DERMATOLOGY REFERRAL          Today's Medication Changes          These changes are accurate as of: 7/19/17  4:27 PM.  If you have any questions, ask your nurse or doctor.               Stop taking these medicines if you haven't already. Please contact your care team if you have questions.     CALCIUM + D PO   Stopped by:  Nay Hess APRN CNP                    Primary Care Provider Office Phone # Fax #    Mahesh Santoyo [phone]       [address]  [facility] [address]        Equal Access to Services     [facility] YURIY AH: Hadjuan diego Rodriguez, jose cancino, leticia mcclure. So [facility] [phone].    ATENCIÓN: Si habla español, tiene a montgomery disposición servicios gratuitos de asistencia lingüística. Llame al [phone].    We comply with applicable federal civil rights laws and [place] laws. We do not " discriminate on the basis of race, color, national origin, age, disability sex, sexual orientation or gender identity.            Thank you!     Thank you for choosing [facility]  for your care. Our goal is always to provide you with excellent care. Hearing back from our patients is one way we can continue to improve our services. Please take a few minutes to complete the written survey that you may receive in the mail after your visit with us. Thank you!             Your Updated Medication List - Protect others around you: Learn how to safely use, store and throw away your medicines at www.disposemymeds.org.          This list is accurate as of: 7/19/17  4:27 PM.  Always use your most recent med list.                   Brand Name Dispense Instructions for use Diagnosis    calcium carbonate 1250 MG tablet    OS-MINERVA 500 mg [place]. [place]     Take 1 tablet by mouth 2 times daily        CO Q-10 PO      Take 1 capsule by mouth daily        EVENING PRIMROSE OIL PO           FISH OIL      daily        magnesium 100 MG Caps      Take by mouth daily        VITAMIN C CR PO      Take 1 tablet by mouth daily.        VITAMIN E COMPLEX PO           VITEYES AREDS FORMULA/LUTEIN Caps      Take 1 tablet by mouth daily

## 2017-07-19 NOTE — PROGRESS NOTES
"HPI      SUBJECTIVE:                                                    Katie Hernandez is a 57 year old female who presents to clinic today for the following health issues:      Skin lesion on right hand      Description (location/character/radiation): Patient has personal hx of pre-cancerous moles removed    Intensity:  moderate    Accompanying signs and symptoms: pain on area; scabbed and came off-now back    History (similar episodes/previous evaluation): family hx of skin cancer    Precipitating or alleviating factors: None    Therapies tried and outcome: Derm appointment 1 month       Scab of right wrist  Scratched the scab off and now it is back   No significant itching   Last went in Feb to derm   Has appt with derm specialist Aug 9 and does not want to wait that long because she has a friend who was just diagnosed with skin cancer       Past Medical History:   Diagnosis Date     Abnormal stools 5/30/2013     Anemia     \"Related to heavy periods\"     Complex endometrial hyperplasia with atypia 10/17/2013    Path dx w complete hysterectomy.      H/O colonoscopy 03/2017    nl, bx nl     Kidney cysts     left seen on CT     Macular degeneration 2015    Taking vitamin.     Menarche     cycles q 28 x 5 d     Menorrhagia 5/16/2007    Oct 2009 ->anemia to 7+ gms.  Resolved with OTC po FE 50 mg/d      Precancerous skin lesion     excised -- abdomen. ->yrly Derm cks     S/P total hysterectomy and bilateral salpingo-oophorectomy 10/17/2013    Path: -->Complex endometrial hyperplasia with atypia.      SOB (shortness of breath) 04/2017    also elevated d dimer - ct no pe, some fibrosis or atelect, breast cysts, then est echo nl 4/17     Swelling of the ankle, feet, or leg     Ankles     Unspecified hemorrhoids without mention of complication 2007    bleeding to anemia -- hgb 8.4 and lower....     Uterine leiomyoma 5/13/2009    [place] diagnosis [place] 8/10/2011 US in this EMR confirms multiple myomas with a " 12 mm endometrial stripe.  (Problem list name updated by automated process. Provider to review and confirm*     Past Surgical History:   Procedure Laterality Date     APPENDECTOMY OPEN  age 3     C LAP,SURG,COLECTOMY, PARTIAL, W/ANAST  age 3    tumor in colon as child (removed tumor and appendix)      SECTION   and      COLONOSCOPY      virtual     COLONOSCOPY  2013    Procedure: COLONOSCOPY;  COLONOSCOPY;  Surgeon: Db Reed MD;  Location:  GI     COLONOSCOPY N/A 3/14/2017    Procedure: COMBINED COLONOSCOPY, SINGLE OR MULTIPLE BIOPSY/POLYPECTOMY BY BIOPSY;  Surgeon: Db Reed MD;  Location: [facility]     DAVINCI HYSTERECTOMY TOTAL, BILATERAL SALPINGO-OOPHORECTOMY, COMBINED  10/17/2013    Procedure: COMBINED DAVINCI HYSTERECTOMY TOTAL, SALPINGO-OOPHORECTOMY;  DaVinci Assisted Total Laparoscopic Hysterectomy/Bilateral Salpingo Oophorectomy Cystoscopy  ;  Surgeon: Zoya Vance MD;  [facility]     DILATION AND CURETTAGE, OPERATIVE HYSTEROSCOPY WITH MORCELLATOR, COMBINED  2013    Procedure: COMBINED DILATION AND CURETTAGE, OPERATIVE HYSTEROSCOPY WITH MORCELLATOR;  Dilation and Curettage, Hysteroscopy, Hysteroscopic  Polypectomy with Myosure ;  Surgeon: Concepción Gamboa MD;  Location: [facility]     LAPAROSCOPY DIAGNOSTIC (GYN)  1990    ectopic excision     Social History   Substance Use Topics     Smoking status: Never Smoker     Smokeless tobacco: Never Used     Alcohol use Yes      Comment: 1/2 d / mo     Current Outpatient Prescriptions   Medication Sig Dispense Refill     EVENING PRIMROSE OIL PO        VITAMIN E COMPLEX PO        calcium carbonate (OS-MINERVA 500 MG [place]. [place]) 1250 MG tablet Take 1 tablet by mouth 2 times daily       Multiple Vitamins-Minerals (VITEYES AREDS FORMULA/LUTEIN) CAPS Take 1 tablet by mouth daily       magnesium 100 MG CAPS Take by mouth daily       FISH OIL daily       Coenzyme Q10 (CO Q-10 PO) Take 1 capsule by mouth daily    "    Ascorbic Acid (VITAMIN C CR PO) Take 1 tablet by mouth daily.       Allergies   Allergen Reactions     Hydrocodone Other (See Comments)     Pt fainted     Penicillins Hives     Doxycycline Other (See Comments)     Feeling of bugs crawling on skin and tinnitus     Hibiclens      Itching after preop washing.       Reviewed and updated as needed this visit by clinical staff and provider      ROS  Detailed as above       BP 94/63 (BP Location: Right arm, Patient Position: Chair, Cuff Size: Adult Regular)  Pulse 75  Temp 98.8  F (37.1  C) (Tympanic)  Resp 16  Ht 5' 4\" (1.626 m)  Wt 134 lb (60.8 kg)  LMP 08/20/2013  SpO2 98%  BMI 23 kg/m2    Physical Exam   Constitutional: She is well-developed, well-nourished, and in no distress.   Neurological: She is alert.   Skin: Skin is warm and dry.   Very tiny scab of right dorsal wrist   Psychiatric: Affect normal. Her mood appears anxious.   Vitals reviewed.      Assessment and Plan:       ICD-10-CM    1. Skin lesion L98.9 DERMATOLOGY REFERRAL       I was able to get the pt a sooner appt with derm per pt's request   Patient Instructions   Please follow up with Dr Chau on July 25 9:30       Nay Hess, APRN, CNP  [facility]    "

## 2017-08-26 ENCOUNTER — HEALTH MAINTENANCE LETTER (OUTPATIENT)
Age: 57
End: 2017-08-26

## 2017-08-30 ENCOUNTER — OFFICE VISIT (OUTPATIENT)
Dept: OBGYN | Facility: CLINIC | Age: 57
End: 2017-08-30
Attending: OBSTETRICS & GYNECOLOGY
Payer: COMMERCIAL

## 2017-08-30 VITALS
HEIGHT: 64 IN | DIASTOLIC BLOOD PRESSURE: 69 MMHG | BODY MASS INDEX: 22.81 KG/M2 | SYSTOLIC BLOOD PRESSURE: 100 MMHG | WEIGHT: 133.6 LBS | HEART RATE: 72 BPM

## 2017-08-30 DIAGNOSIS — N64.4 BREAST PAIN, RIGHT: Primary | ICD-10-CM

## 2017-08-30 PROCEDURE — 99212 OFFICE O/P EST SF 10 MIN: CPT | Mod: ZF

## 2017-08-30 NOTE — MR AVS SNAPSHOT
After Visit Summary   8/30/2017    Katie Hernandez    MRN: [id number]           Patient Information     Date Of Birth          1960        Visit Information        Provider Department      8/30/2017 8:15 AM Chelsi Hay MD [facility]        Today's Diagnoses     Breast pain, right    -  1       Follow-ups after your visit        Your next 10 appointments already scheduled     Nov 09, 2017 12:45 PM CST   Annual Visit with Chelsi Hay MD   [facility] ([facility])    [facility] [address]  [address]  [facility] [address] [phone]              Future tests that were ordered for you today     Open Future Orders        Priority Expected Expires Ordered    US Breast Bilateral Complete 4 Quadrants Routine  8/30/2018 8/30/2017            Who to contact     Please call your clinic at [phone] to:    Ask questions about your health    Make or cancel appointments    Discuss your medicines    Learn about your test results    Speak to your doctor   If you have compliments or concerns about an experience at your clinic, or if you wish to file a complaint, please contact [facility] Patient Relations at [phone] or email us at [email or web address]         Additional Information About Your Visit        MyChart Information     [employer] gives you secure access to your electronic health record. If you see a primary care provider, you can also send messages to your care team and make appointments. If you have questions, please call your primary care clinic.  If you do not have a primary care provider, please call [phone] and they will assist you.      [employer] is an electronic gateway that provides easy, online access to your medical records. With [employer], you can request a clinic appointment, read your test results, renew a prescription or communicate with  "your care team.     To access your existing account, please contact your [facility] or call [phone] for assistance.        Care EveryWhere ID     This is your Care EveryWhere ID. This could be used by other organizations to access your [place] medical records  [id number]        Your Vitals Were     Pulse Height Last Period BMI (Body Mass Index)          72 1.626 m (5' 4\") 08/20/2013 22.93 kg/m2         Blood Pressure from Last 3 Encounters:   08/30/17 100/69   07/19/17 94/63   06/19/17 112/64    Weight from Last 3 Encounters:   08/30/17 60.6 kg (133 lb 9.6 oz)   07/19/17 60.8 kg (134 lb)   06/19/17 63 kg (139 lb)               Primary Care Provider Office Phone # Fax #    Mahesh Remi Santoyo [phone] [address]  [place] [address]        Equal Access to Services     [facility] : Hadii aad ku hadasho Soomaali, waaxda luqadaha, qaybta kaalmada adeegyada, waxay idiin hayaan nahomy estrada . So [facility] [phone].    ATENCIÓN: Si habla español, tiene a montgomery disposición servicios gratuitos de asistencia lingüística. Ll[facility] [phone].    We comply with applicable federal civil rights laws and [place] laws. We do not discriminate on the basis of race, color, national origin, age, disability sex, sexual orientation or gender identity.            Thank you!     Thank you for choosing [facility]  for your care. Our goal is always to provide you with excellent care. Hearing back from our patients is one way we can continue to improve our services. Please take a few minutes to complete the written survey that you may receive in the mail after your visit with us. Thank you!             Your Updated Medication List - Protect others around you: Learn how to safely use, store and throw away your medicines at www.disposemymeds.org.          This list is accurate as of: 8/30/17  8:33 AM.  Always use your most recent med " list.                   Brand Name Dispense Instructions for use Diagnosis    calcium carbonate 1250 MG tablet    OS-MINERVA 500 mg [place]. [place]     Take 1 tablet by mouth 2 times daily        CO Q-10 PO      Take 1 capsule by mouth daily        EVENING PRIMROSE OIL PO           FISH OIL      daily        magnesium 100 MG Caps      Take by mouth daily        VITAMIN C CR PO      Take 1 tablet by mouth daily.        VITAMIN E COMPLEX PO           VITEYES AREDS FORMULA/LUTEIN Caps      Take 1 tablet by mouth daily

## 2017-08-30 NOTE — LETTER
"8/30/2017       RE: Katie Hernandez  [address]  [facility] [address]     Dear Colleague,    Thank you for referring your patient, Katie Hernandez, to the [facility] at [facility]. Please see a copy of my visit note below.    S: 58yo with normal mammogram 5/2017 presents with right breast pain. States pain is intermittent, shooting, and without cause.   Comfort (How is your pain?): Tolerable with discomfort  Change in Pain (Since your last medication/intervention?): About the same  Pain Control (How are your pain treatments working?): Partially effective pain control  Functioning (Are you able to do activities to get better?) : Can do most things, but pain gets in the way of some   Sleep (Does your pain management allow you to sleep or rest?): Awake with occasional pain  Patient Active Problem List   Diagnosis     Anemia due to acute blood loss     Mild intermittent asthma     Lipoma of axilla -- left     Cyst of breast, right, solitary     Stiffness of joint, hand     Anemia     S/P total hysterectomy and bilateral salpingo-oophorectomy     Fibrocystic breast changes     Other anterior corneal dystrophies     Past Medical History:   Diagnosis Date     Abnormal stools 5/30/2013     Anemia     \"Related to heavy periods\"     Complex endometrial hyperplasia with atypia 10/17/2013    Path dx w complete hysterectomy.      H/O colonoscopy 03/2017    nl, bx nl     Kidney cysts     left seen on CT     Macular degeneration 2015    Taking vitamin.     Menarche     cycles q 28 x 5 d     Menorrhagia 5/16/2007    Oct 2009 ->anemia to 7+ gms.  Resolved with OTC po FE 50 mg/d      Precancerous skin lesion     excised -- abdomen. ->yrly Derm cks     S/P total hysterectomy and bilateral salpingo-oophorectomy 10/17/2013    Path: -->Complex endometrial hyperplasia with atypia.      SOB (shortness of breath) 04/2017    also elevated d dimer - ct no pe, some " fibrosis or atelect, breast cysts, then est echo nl      Swelling of the ankle, feet, or leg     Ankles     Unspecified hemorrhoids without mention of complication     bleeding to anemia -- hgb 8.4 and lower....     Uterine leiomyoma 2009    [place] diagnosis while [place] 8/10/2011 US in this EMR confirms multiple myomas with a 12 mm endometrial stripe.  (Problem list name updated by automated process. Provider to review and confirm*     Past Surgical History:   Procedure Laterality Date     APPENDECTOMY OPEN  age 3     C LAP,SURG,COLECTOMY, PARTIAL, W/ANAST  age 3    tumor in colon as child (removed tumor and appendix)      SECTION   and      COLONOSCOPY      virtual     COLONOSCOPY  2013    Procedure: COLONOSCOPY;  COLONOSCOPY;  Surgeon: Db Reed MD;  Location:  GI     COLONOSCOPY N/A 3/14/2017    Procedure: COMBINED COLONOSCOPY, SINGLE OR MULTIPLE BIOPSY/POLYPECTOMY BY BIOPSY;  Surgeon: Db Reed MD;  Location:  GI     DAVINCI HYSTERECTOMY TOTAL, BILATERAL SALPINGO-OOPHORECTOMY, COMBINED  10/17/2013    Procedure: COMBINED DAVINCI HYSTERECTOMY TOTAL, SALPINGO-OOPHORECTOMY;  DaVinci Assisted Total Laparoscopic Hysterectomy/Bilateral Salpingo Oophorectomy Cystoscopy  ;  Surgeon: Zoya Vance MD;  [facility]     DILATION AND CURETTAGE, OPERATIVE HYSTEROSCOPY WITH MORCELLATOR, COMBINED  2013    Procedure: COMBINED DILATION AND CURETTAGE, OPERATIVE HYSTEROSCOPY WITH MORCELLATOR;  Dilation and Curettage, Hysteroscopy, Hysteroscopic  Polypectomy with Myosure ;  Surgeon: Concepción Gamboa MD;  Location: [facility]     LAPAROSCOPY DIAGNOSTIC (GYN)      ectopic excision     Obstetric History       T2      L2     SAB0   TAB0   Ectopic1   Multiple0   Live Births2       # Outcome Date GA Lbr Lenard/2nd Weight Sex Delivery Anes PTL Lv   5 Term 10/26/97 38w0d 21:00 3.7 kg (8 lb 2.5 oz) M CS-LTranv   LIVE BIRTH      Name:  "Christopher   4 Term 08/05/92 37w0d 26:00 3.6 kg (7 lb 15 oz) F CS-LTranv   LIVE BIRTH      Name: Keren Aaron Ectopic 1991 2 SAB 1 SAB                 /69  Pulse 72  Ht 1.626 m (5' 4\")  Wt 60.6 kg (133 lb 9.6 oz)  LMP 08/20/2013  BMI 22.93 kg/m2  Exam:  Constitutional: healthy, alert and no distress  Gastrointestinal: Abdomen soft, non-tender. BS normal. No masses, organomegaly  Breast: right breast tender with palpation and near palpable soft, mobile mass 1x1 cm just inferior/lateral to right nipple. No LAD. Left breast and axilla wnl.   : Deferred  Musculoskeletal: extremities normal- no gross deformities noted, gait normal and normal muscle tone  Skin: no suspicious lesions or rashes  Psychiatric: mentation appears normal and affect normal/bright    A: new onset right breast pain with small mobile lesion  P: breast u/s jelani Hay    "

## 2017-08-30 NOTE — PROGRESS NOTES
"S: 58yo with normal mammogram 5/2017 presents with right breast pain. States pain is intermittent, shooting, and without cause.   Comfort (How is your pain?): Tolerable with discomfort  Change in Pain (Since your last medication/intervention?): About the same  Pain Control (How are your pain treatments working?): Partially effective pain control  Functioning (Are you able to do activities to get better?) : Can do most things, but pain gets in the way of some   Sleep (Does your pain management allow you to sleep or rest?): Awake with occasional pain    Patient Active Problem List   Diagnosis     Anemia due to acute blood loss     Mild intermittent asthma     Lipoma of axilla -- left     Cyst of breast, right, solitary     Stiffness of joint, hand     Anemia     S/P total hysterectomy and bilateral salpingo-oophorectomy     Fibrocystic breast changes     Other anterior corneal dystrophies     Past Medical History:   Diagnosis Date     Abnormal stools 5/30/2013     Anemia     \"Related to heavy periods\"     Complex endometrial hyperplasia with atypia 10/17/2013    Path dx w complete hysterectomy.      H/O colonoscopy 03/2017    nl, bx nl     Kidney cysts     left seen on CT     Macular degeneration 2015    Taking vitamin.     Menarche     cycles q 28 x 5 d     Menorrhagia 5/16/2007    Oct 2009 ->anemia to 7+ gms.  Resolved with OTC po FE 50 mg/d      Precancerous skin lesion     excised -- abdomen. ->yrly Derm cks     S/P total hysterectomy and bilateral salpingo-oophorectomy 10/17/2013    Path: -->Complex endometrial hyperplasia with atypia.      SOB (shortness of breath) 04/2017    also elevated d dimer - ct no pe, some fibrosis or atelect, breast cysts, then est echo nl 4/17     Swelling of the ankle, feet, or leg     Ankles     Unspecified hemorrhoids without mention of complication 2007    bleeding to anemia -- hgb 8.4 and lower....     Uterine leiomyoma 5/13/2009    [place] diagnosis while [place] 8/10/2011 US " "in this EMR confirms multiple myomas with a 12 mm endometrial stripe.  (Problem list name updated by automated process. Provider to review and confirm*     Past Surgical History:   Procedure Laterality Date     APPENDECTOMY OPEN  age 3     C LAP,SURG,COLECTOMY, PARTIAL, W/ANAST  age 3    tumor in colon as child (removed tumor and appendix)      SECTION   and      COLONOSCOPY      virtual     COLONOSCOPY  2013    Procedure: COLONOSCOPY;  COLONOSCOPY;  Surgeon: Db Reed MD;  Location:  GI     COLONOSCOPY N/A 3/14/2017    Procedure: COMBINED COLONOSCOPY, SINGLE OR MULTIPLE BIOPSY/POLYPECTOMY BY BIOPSY;  Surgeon: Db Reed MD;  Location:  GI     DAVINCI HYSTERECTOMY TOTAL, BILATERAL SALPINGO-OOPHORECTOMY, COMBINED  10/17/2013    Procedure: COMBINED DAVINCI HYSTERECTOMY TOTAL, SALPINGO-OOPHORECTOMY;  DaVinci Assisted Total Laparoscopic Hysterectomy/Bilateral Salpingo Oophorectomy Cystoscopy  ;  Surgeon: Zoya Vance MD;  [facility]     DILATION AND CURETTAGE, OPERATIVE HYSTEROSCOPY WITH MORCELLATOR, COMBINED  2013    Procedure: COMBINED DILATION AND CURETTAGE, OPERATIVE HYSTEROSCOPY WITH MORCELLATOR;  Dilation and Curettage, Hysteroscopy, Hysteroscopic  Polypectomy with Myosure ;  Surgeon: Concepción Gamboa MD;  [facility]     LAPAROSCOPY DIAGNOSTIC (GYN)      ectopic excision     Obstetric History       T2      L2     SAB0   TAB0   Ectopic1   Multiple0   Live Births2       # Outcome Date GA Lbr Lenard/2nd Weight Sex Delivery Anes PTL Lv   5 Term 10/26/97 38w0d 21:00 3.7 kg (8 lb 2.5 oz) M CS-LTranv   LIVE BIRTH      Name: Christopher   4 Term 92 37w0d 26:00 3.6 kg (7 lb 15 oz) F CS-LTranv   LIVE BIRTH      Name: Kerne   3 Ectopic            2 SAB            1 SAB                 /69  Pulse 72  Ht 1.626 m (5' 4\")  Wt 60.6 kg (133 lb 9.6 oz)  LMP 2013  BMI 22.93 kg/m2  Exam:  Constitutional: " healthy, alert and no distress  Gastrointestinal: Abdomen soft, non-tender. BS normal. No masses, organomegaly  Breast: right breast tender with palpation and near palpable soft, mobile mass 1x1 cm just inferior/lateral to right nipple. No LAD. Left breast and axilla wnl.   : Deferred  Musculoskeletal: extremities normal- no gross deformities noted, gait normal and normal muscle tone  Skin: no suspicious lesions or rashes  Psychiatric: mentation appears normal and affect normal/bright    A: new onset right breast pain with small mobile lesion  P: breast u/s asap  Chelsi Hay

## 2017-09-07 ENCOUNTER — HOSPITAL ENCOUNTER (OUTPATIENT)
Dept: MAMMOGRAPHY | Facility: CLINIC | Age: 57
End: 2017-09-07
Attending: OBSTETRICS & GYNECOLOGY
Payer: COMMERCIAL

## 2017-09-07 ENCOUNTER — HOSPITAL ENCOUNTER (OUTPATIENT)
Dept: MAMMOGRAPHY | Facility: CLINIC | Age: 57
Discharge: HOME | End: 2017-09-07
Attending: OBSTETRICS & GYNECOLOGY | Admitting: OBSTETRICS & GYNECOLOGY
Payer: COMMERCIAL

## 2017-09-07 DIAGNOSIS — N64.4 BREAST PAIN, RIGHT: ICD-10-CM

## 2017-09-07 PROCEDURE — 76642 ULTRASOUND BREAST LIMITED: CPT | Mod: RT

## 2017-09-07 PROCEDURE — G0279 TOMOSYNTHESIS, MAMMO: HCPCS

## 2017-09-12 ENCOUNTER — OFFICE VISIT (OUTPATIENT)
Dept: FAMILY MEDICINE | Facility: CLINIC | Age: 57
End: 2017-09-12
Payer: COMMERCIAL

## 2017-09-12 VITALS
OXYGEN SATURATION: 98 % | DIASTOLIC BLOOD PRESSURE: 70 MMHG | HEART RATE: 71 BPM | TEMPERATURE: 98.1 F | HEIGHT: 64 IN | BODY MASS INDEX: 22.36 KG/M2 | WEIGHT: 131 LBS | SYSTOLIC BLOOD PRESSURE: 103 MMHG

## 2017-09-12 DIAGNOSIS — M62.81 GENERALIZED MUSCLE WEAKNESS: ICD-10-CM

## 2017-09-12 DIAGNOSIS — R42 DIZZINESS: Primary | ICD-10-CM

## 2017-09-12 LAB
ERYTHROCYTE [DISTWIDTH] IN BLOOD BY AUTOMATED COUNT: 13.7 % (ref 10–15)
HCT VFR BLD AUTO: 39.1 % (ref 35–47)
HGB BLD-MCNC: 13.4 G/DL (ref 11.7–15.7)
MCH RBC QN AUTO: 30.9 PG (ref 26.5–33)
MCHC RBC AUTO-ENTMCNC: 34.3 G/DL (ref 31.5–36.5)
MCV RBC AUTO: 90 FL (ref 78–100)
PLATELET # BLD AUTO: 237 10E9/L (ref 150–450)
RBC # BLD AUTO: 4.33 10E12/L (ref 3.8–5.2)
WBC # BLD AUTO: 6.5 10E9/L (ref 4–11)

## 2017-09-12 PROCEDURE — 99214 OFFICE O/P EST MOD 30 MIN: CPT | Performed by: INTERNAL MEDICINE

## 2017-09-12 PROCEDURE — 85027 COMPLETE CBC AUTOMATED: CPT | Performed by: INTERNAL MEDICINE

## 2017-09-12 PROCEDURE — 36415 COLL VENOUS BLD VENIPUNCTURE: CPT | Performed by: INTERNAL MEDICINE

## 2017-09-12 PROCEDURE — 80048 BASIC METABOLIC PNL TOTAL CA: CPT | Performed by: INTERNAL MEDICINE

## 2017-09-12 PROCEDURE — 84443 ASSAY THYROID STIM HORMONE: CPT | Performed by: INTERNAL MEDICINE

## 2017-09-12 NOTE — PROGRESS NOTES
"Katie Hernandez is a 57 year old female who presents for dizziness. The patient notes it off and on for a month, no pattern, can be at rest, when seated or when up and about, but not orthostatic related.  She has bilat ringing of the ears with it, feels faint and really tired.  No vertigo or syncope, but feels faint.  No gait diff, no motor, speech changes. No diet changes or new meds, but takes take few supplements.  Not sure if eating helps.  SHe wonders about low sugar as cause.  Prior anemia.  When has it feels like not thinking clearly, goes away on it's own.  Has not had blood pressure checked when having it.  No chest pain or shortness of breath.  Has had pain under bilat ax for over a year, prior mammogram, breast exam and ct chest all neg.  Has itching elbows at times noted before and occasionally neck area and at times feels like things crawling on skin.  NO hearing loss or ha.  No chest pain or shortness of breath.  No gi c/o x when eating fatty foods.    Past Medical History:   Diagnosis Date     Abnormal stools 5/30/2013     Anemia     \"Related to heavy periods\"     Complex endometrial hyperplasia with atypia 10/17/2013    Path dx w complete hysterectomy.      H/O colonoscopy 03/2017    nl, bx nl     Kidney cysts     left seen on CT     Macular degeneration 2015    Taking vitamin.     Menarche     cycles q 28 x 5 d     Menorrhagia 5/16/2007    Oct 2009 ->anemia to 7+ gms.  Resolved with OTC po FE 50 mg/d      Precancerous skin lesion     excised -- abdomen. ->yrly Derm cks     S/P total hysterectomy and bilateral salpingo-oophorectomy 10/17/2013    Path: -->Complex endometrial hyperplasia with atypia.      SOB (shortness of breath) 04/2017    also elevated d dimer - ct no pe, some fibrosis or atelect, breast cysts, then est echo nl 4/17     Swelling of the ankle, feet, or leg     Ankles     Unspecified hemorrhoids without mention of complication 2007    bleeding to anemia -- hgb 8.4 and lower....     " Uterine leiomyoma 2009    [place] diagnosis while in [place] 8/10/2011 US in this EMR confirms multiple myomas with a 12 mm endometrial stripe.  (Problem list name updated by automated process. Provider to review and confirm*     Past Surgical History:   Procedure Laterality Date     APPENDECTOMY OPEN  age 3     C LAP,SURG,COLECTOMY, PARTIAL, W/ANAST  age 3    tumor in colon as child (removed tumor and appendix)      SECTION   and      COLONOSCOPY      virtual     COLONOSCOPY  2013    Procedure: COLONOSCOPY;  COLONOSCOPY;  Surgeon: Db Reed MD;  Location:  GI     COLONOSCOPY N/A 3/14/2017    Procedure: COMBINED COLONOSCOPY, SINGLE OR MULTIPLE BIOPSY/POLYPECTOMY BY BIOPSY;  Surgeon: Db Reed MD;  Location:  GI     DAVINCI HYSTERECTOMY TOTAL, BILATERAL SALPINGO-OOPHORECTOMY, COMBINED  10/17/2013    Procedure: COMBINED DAVINCI HYSTERECTOMY TOTAL, SALPINGO-OOPHORECTOMY;  DaVinci Assisted Total Laparoscopic Hysterectomy/Bilateral Salpingo Oophorectomy Cystoscopy  ;  Surgeon: Zoya Vance MD;  Location: [facility] OR     DILATION AND CURETTAGE, OPERATIVE HYSTEROSCOPY WITH MORCELLATOR, COMBINED  2013    Procedure: COMBINED DILATION AND CURETTAGE, OPERATIVE HYSTEROSCOPY WITH MORCELLATOR;  Dilation and Curettage, Hysteroscopy, Hysteroscopic  Polypectomy with Myosure ;  Surgeon: Concepción Gamboa MD;  Location: [facility] OR     LAPAROSCOPY DIAGNOSTIC (GYN)  1990    ectopic excision     Social History     Social History     Marital status:      Spouse name: N/A     Number of children: 2     Years of education: N/A     Occupational History     Faculty [employer]     [employer]aches [employer]     Social History Main Topics     Smoking status: Never Smoker     Smokeless tobacco: Never Used     Alcohol use Yes      Comment: 1/2 d / mo     Drug use: No     Sexual activity: Yes     Partners: Male     Birth control/ protection: Surgical      Comment: Vastectomy     Other  "Topics Concern     Blood Transfusions No     Caffeine Concern No     Stopped all caffiene 2nd to breast pain     Occupational Exposure No     Hobby Hazards No     Sleep Concern No     Stress Concern No     Weight Concern No     Special Diet No     Back Care No     Exercise No     45' aerobic 3xs/wk     Bike Helmet No     Seat Belt No     Social History Narrative     Current Outpatient Prescriptions   Medication Sig Dispense Refill     EVENING PRIMROSE OIL PO        VITAMIN E COMPLEX PO        calcium carbonate (OS-MINERVA 500 MG [place]. CA) 1250 MG tablet Take 1 tablet by mouth 2 times daily       Multiple Vitamins-Minerals (VITEYES AREDS FORMULA/LUTEIN) CAPS Take 1 tablet by mouth daily       magnesium 100 MG CAPS Take by mouth daily       FISH OIL daily       Coenzyme Q10 (CO Q-10 PO) Take 1 capsule by mouth daily       Ascorbic Acid (VITAMIN C CR PO) Take 1 tablet by mouth daily.       Allergies   Allergen Reactions     Hydrocodone Other (See Comments)     Pt fainted     Penicillins Hives     Doxycycline Other (See Comments)     Feeling of bugs crawling on skin and tinnitus     Hibiclens      Itching after preop washing.     FAMILY HISTORY NOTED AND REVIEWED    REVIEW OF SYSTEMS: above    PHYSICAL EXAM    /70  Pulse 71  Temp 98.1  F (36.7  C) (Oral)  Ht 5' 4\" (1.626 m)  Wt 131 lb (59.4 kg)  LMP 08/20/2013  SpO2 98%  Breastfeeding? No  BMI 22.49 kg/m2    Patient appears non toxic  Mouth - tongue midline and within normal limits, mucous membranes and posterior pharynx within normal limits, no lesions seen.  Neck - no masses, lesions or tenderness  Nodes - no supraclavicular, cervical or axially adenopathy .  Lungs - clear, normal flow  Cardiovascular - regular rate and rhythm, no murmer, rub or gallop, no jvp or edema, carotids within normal limits, no bruits.  Abdomen - normal active bowel sounds, soft, non tender, no masses, guarding or rebound, no hepatosplenomegaly  tms and canals within normal " limits  No nystag  Neuro: alert, speech fluent, cn within normal limits, motor grossly within normal limits, romberg neg, no arm drift, tandom gait within normal limits.    Labs sent    ASSESSMENT:  Dizziness and weakness of unclear cause, but neg exam.  I doubt this is cv, has prior normal est as noted.  I doubt rhythm issue, doubt anemia but will check.  ?low sugar, but somewhat doubtful.  ?low blood pressure, has low blood pressure at baseline.  ?supplements.  I doubt cns process, tumor, etc.      PLAN:  Labs today  Dc all supplements  She will check blood pressure with spell and if low leg me know      Mahesh Santoyo M.D.    Later patient was having blood drawn and passed out with low blood pressure and hr.  Lasted few minutes then fine and drank and sat and blood pressure later fine and felt fine.  I suspect vasovagal spell for this, not related to above.    Mahesh Santoyo M.D.

## 2017-09-12 NOTE — NURSING NOTE
Pt was in to see PCP. Upon lab draw at end of visit, passed out in lab. Came too, able to give name and date of birth with delayed response.  Went unconscious again, BP 66/34, Pulse 34.  911 called for EMS.   Pt kept safe in chair, and after approx 1 min, regained consciousness again. This time more alert. Bp up to 79/56, HR 63.  02 97pct.  Given Juice and water.  Pt fully alert, vitals stable upon arrival of EMS.  Refused ems.  When fully alert, transferred to a room with bed. Huddled with PCP, waited 10 minutes.  Pt BP up to 100/60, which pt reports is baseline.  Blood drawn again, no complications.  Pt alert and oriented, able to carry on conversation.  Kept after blood draw, and given glass of water.  BP 96/58.  Pt denies any concerns, signs of dizziness at this point, and left clinic to rest at home.

## 2017-09-12 NOTE — NURSING NOTE
"Chief Complaint   Patient presents with     Dizziness       Initial /70  Pulse 71  Temp 98.1  F (36.7  C) (Oral)  Ht 5' 4\" (1.626 m)  Wt 131 lb (59.4 kg)  LMP 08/20/2013  SpO2 98%  Breastfeeding? No  BMI 22.49 kg/m2 Estimated body mass index is 22.49 kg/(m^2) as calculated from the following:    Height as of this encounter: 5' 4\" (1.626 m).    Weight as of this encounter: 131 lb (59.4 kg).  Medication Reconciliation: complete   Emilia BATISTA CMA      "

## 2017-09-12 NOTE — MR AVS SNAPSHOT
After Visit Summary   9/12/2017    Katie Hernandez    MRN: [id number]           Patient Information     Date Of Birth          1960        Visit Information        Provider Department      9/12/2017 2:30 PM Mahesh Santoyo MD [facility]        Today's Diagnoses     Dizziness    -  1    Generalized muscle weakness           Follow-ups after your visit        Your next 10 appointments already scheduled     Nov 09, 2017 12:45 PM CST   Annual Visit with Chelsi Hay MD   [facility] ([facility])    [facility] [address]  [address]  [facility] [address] [phone]              Who to contact     If you have questions or need follow up information about today's clinic visit or your schedule please contact [facility] directly at [phone].  Normal or non-critical lab and imaging results will be communicated to you by MyChart, letter or phone within 4 business days after the clinic has received the results. If you do not hear from us within 7 days, please contact the clinic through MyChart or phone. If you have a critical or abnormal lab result, we will notify you by phone as soon as possible.  Submit refill requests through [employer] or call your pharmacy and they will forward the refill request to us. Please allow 3 business days for your refill to be completed.          Additional Information About Your Visit        MyChart Information     [employer] gives you secure access to your electronic health record. If you see a primary care provider, you can also send messages to your care team and make appointments. If you have questions, please call your primary care clinic.  If you do not have a primary care provider, please call [phone] and they will assist you.        Care EveryWhere ID     This is your Care EveryWhere ID. This could be used by other organizations to access your [place]  "medical records  [id number]        Your Vitals Were     Pulse Temperature Height Last Period Pulse Oximetry Breastfeeding?    71 98.1  F (36.7  C) (Oral) 5' 4\" (1.626 m) 08/20/2013 98% No    BMI (Body Mass Index)                   22.49 kg/m2            Blood Pressure from Last 3 Encounters:   09/12/17 103/70   08/30/17 100/69   07/19/17 94/63    Weight from Last 3 Encounters:   09/12/17 131 lb (59.4 kg)   08/30/17 133 lb 9.6 oz (60.6 kg)   07/19/17 134 lb (60.8 kg)              We Performed the Following     Basic metabolic panel     CBC with platelets     TSH with free T4 reflex        Primary Care Provider Office Phone # Fax #    Mahesh Santoyo [phone] [address] [facility] [address]  [place] [address]        Equal Access to Services     [facility] : Hadii henrique ku robyo Soreese, waaxda lujorge luis, qaybta kaalmada adeanneyanila, leticia estrada . So [facility] [phone].    ATENCIÓN: Si habla español, tiene a montgomery disposición servicios gratuitos de asistencia lingüística. Llame al [phone].    We comply with applicable federal civil rights laws and [place] laws. We do not discriminate on the basis of race, color, national origin, age, disability sex, sexual orientation or gender identity.            Thank you!     Thank you for choosing [facility]  for your care. Our goal is always to provide you with excellent care. Hearing back from our patients is one way we can continue to improve our services. Please take a few minutes to complete the written survey that you may receive in the mail after your visit with us. Thank you!             Your Updated Medication List - Protect others around you: Learn how to safely use, store and throw away your medicines at www.disposemymeds.org.          This list is accurate as of: 9/12/17  4:48 PM.  Always use your most recent med list.                   Brand Name Dispense Instructions for use Diagnosis    calcium " carbonate 1250 MG tablet    OS-MINERVA 500 mg [place]. Ca     Take 1 tablet by mouth 2 times daily        CO Q-10 PO      Take 1 capsule by mouth daily        EVENING PRIMROSE OIL PO           FISH OIL      daily        magnesium 100 MG Caps      Take by mouth daily        VITAMIN C CR PO      Take 1 tablet by mouth daily.        VITAMIN E COMPLEX PO           VITEYES AREDS FORMULA/LUTEIN Caps      Take 1 tablet by mouth daily

## 2017-09-13 LAB
ANION GAP SERPL CALCULATED.3IONS-SCNC: 12 MMOL/L (ref 3–14)
BUN SERPL-MCNC: 11 MG/DL (ref 7–30)
CALCIUM SERPL-MCNC: 9 MG/DL (ref 8.5–10.1)
CHLORIDE SERPL-SCNC: 107 MMOL/L (ref 94–109)
CO2 SERPL-SCNC: 22 MMOL/L (ref 20–32)
CREAT SERPL-MCNC: 0.73 MG/DL (ref 0.52–1.04)
GFR SERPL CREATININE-BSD FRML MDRD: 82 ML/MIN/1.7M2
GLUCOSE SERPL-MCNC: 114 MG/DL (ref 70–99)
POTASSIUM SERPL-SCNC: 3.9 MMOL/L (ref 3.4–5.3)
SODIUM SERPL-SCNC: 141 MMOL/L (ref 133–144)
TSH SERPL DL<=0.005 MIU/L-ACNC: 1.25 MU/L (ref 0.4–4)

## 2017-09-13 NOTE — PROGRESS NOTES
It was a pleasure to see you.  I wanted to let you know your test results.  Please let me know if you are not able to view them.    Your labs are all normal.  The sugar is elevated but you had eaten before the lab.  The thyroid and blood count look fine.  Let's see if things improve by stopping the supplements.  If not let me know.  Please also check your blood pressure and pulse when you have one of the episodes.    Mahesh Santoyo M.D.

## 2017-09-15 ENCOUNTER — MYC MEDICAL ADVICE (OUTPATIENT)
Dept: FAMILY MEDICINE | Facility: CLINIC | Age: 57
End: 2017-09-15

## 2017-09-15 DIAGNOSIS — R42 DIZZINESS: Primary | ICD-10-CM

## 2017-09-18 NOTE — TELEPHONE ENCOUNTER
I do not know those people, but ok to see them.  I did refer to [place] [facility], first avail is fine    Thanks    Mahesh Santoyo M.D.

## 2017-10-26 ASSESSMENT — ENCOUNTER SYMPTOMS
POOR WOUND HEALING: 0
NERVOUS/ANXIOUS: 1
BACK PAIN: 0
LEG PAIN: 1
BREAST MASS: 1
SPEECH CHANGE: 0
JOINT SWELLING: 0
DISTURBANCES IN COORDINATION: 0
BREAST PAIN: 1
WEAKNESS: 0
LOSS OF CONSCIOUSNESS: 0
MUSCLE CRAMPS: 0
SYNCOPE: 0
NECK PAIN: 0
NUMBNESS: 0
DIZZINESS: 0
INSOMNIA: 1
HYPERTENSION: 1
PALPITATIONS: 0
DECREASED CONCENTRATION: 1
LIGHT-HEADEDNESS: 1
STIFFNESS: 1
ARTHRALGIAS: 1
NAIL CHANGES: 1
ORTHOPNEA: 0
EXERCISE INTOLERANCE: 0
SEIZURES: 0
SKIN CHANGES: 0
SLEEP DISTURBANCES DUE TO BREATHING: 0
TINGLING: 1
DEPRESSION: 0
LEG SWELLING: 1
PANIC: 0
MYALGIAS: 0
TACHYCARDIA: 0
MUSCLE WEAKNESS: 0
HEADACHES: 0
CLAUDICATION: 0
HYPOTENSION: 1
MEMORY LOSS: 0
PARALYSIS: 0
TREMORS: 0

## 2017-10-26 ASSESSMENT — ANXIETY QUESTIONNAIRES
1. FEELING NERVOUS, ANXIOUS, OR ON EDGE: SEVERAL DAYS
7. FEELING AFRAID AS IF SOMETHING AWFUL MIGHT HAPPEN: NOT AT ALL
7. FEELING AFRAID AS IF SOMETHING AWFUL MIGHT HAPPEN: NOT AT ALL
6. BECOMING EASILY ANNOYED OR IRRITABLE: NOT AT ALL
4. TROUBLE RELAXING: SEVERAL DAYS
GAD7 TOTAL SCORE: 3
GAD7 TOTAL SCORE: 3
3. WORRYING TOO MUCH ABOUT DIFFERENT THINGS: SEVERAL DAYS
GAD7 TOTAL SCORE: 3
2. NOT BEING ABLE TO STOP OR CONTROL WORRYING: NOT AT ALL
5. BEING SO RESTLESS THAT IT IS HARD TO SIT STILL: NOT AT ALL

## 2017-10-27 ASSESSMENT — ANXIETY QUESTIONNAIRES: GAD7 TOTAL SCORE: 3

## 2017-11-09 ENCOUNTER — OFFICE VISIT (OUTPATIENT)
Dept: OBGYN | Facility: CLINIC | Age: 57
End: 2017-11-09
Attending: OBSTETRICS & GYNECOLOGY
Payer: COMMERCIAL

## 2017-11-09 VITALS
HEART RATE: 79 BPM | DIASTOLIC BLOOD PRESSURE: 70 MMHG | SYSTOLIC BLOOD PRESSURE: 105 MMHG | WEIGHT: 140.1 LBS | HEIGHT: 64 IN | BODY MASS INDEX: 23.92 KG/M2

## 2017-11-09 DIAGNOSIS — Z01.419 ENCOUNTER FOR GYNECOLOGICAL EXAMINATION WITHOUT ABNORMAL FINDING: ICD-10-CM

## 2017-11-09 ASSESSMENT — ANXIETY QUESTIONNAIRES
GAD7 TOTAL SCORE: 3
1. FEELING NERVOUS, ANXIOUS, OR ON EDGE: SEVERAL DAYS
3. WORRYING TOO MUCH ABOUT DIFFERENT THINGS: SEVERAL DAYS
5. BEING SO RESTLESS THAT IT IS HARD TO SIT STILL: NOT AT ALL
2. NOT BEING ABLE TO STOP OR CONTROL WORRYING: NOT AT ALL
6. BECOMING EASILY ANNOYED OR IRRITABLE: NOT AT ALL
7. FEELING AFRAID AS IF SOMETHING AWFUL MIGHT HAPPEN: NOT AT ALL

## 2017-11-09 ASSESSMENT — PATIENT HEALTH QUESTIONNAIRE - PHQ9: 5. POOR APPETITE OR OVEREATING: SEVERAL DAYS

## 2017-11-09 NOTE — MR AVS SNAPSHOT
After Visit Summary   11/9/2017    Katie Hernandez    MRN: [id number]           Patient Information     Date Of Birth          1960        Visit Information        Provider Department      11/9/2017 12:45 PM Chelsi Hay MD [facility]        Today's Diagnoses     Encounter for gynecological examination without abnormal finding           Follow-ups after your visit        Follow-up notes from your care team     Return in 1 year (on 11/9/2018) for Preventative Health Visit.      Who to contact     Please call your clinic at [phone] to:    Ask questions about your health    Make or cancel appointments    Discuss your medicines    Learn about your test results    Speak to your doctor   If you have compliments or concerns about an experience at your clinic, or if you wish to file a complaint, please contact [facility] Patient Relations at [phone] or email us at [email or web address]         Additional Information About Your Visit        MyChart Information     [employer] gives you secure access to your electronic health record. If you see a primary care provider, you can also send messages to your care team and make appointments. If you have questions, please call your primary care clinic.  If you do not have a primary care provider, please call [phone] and they will assist you.      [employer] is an electronic gateway that provides easy, online access to your medical records. With [employer], you can request a clinic appointment, read your test results, renew a prescription or communicate with your care team.     To access your existing account, please contact your [facility] or call [phone] for assistance.        Care EveryWhere ID     This is your Care EveryWhere ID. This could be used by other organizations to access your [place] medical records  [id number]        Your Vitals Were      "Pulse Height Last Period BMI (Body Mass Index)          79 1.626 m (5' 4\") 08/20/2013 24.05 kg/m2         Blood Pressure from Last 3 Encounters:   11/09/17 105/70   09/12/17 103/70   08/30/17 100/69    Weight from Last 3 Encounters:   11/09/17 63.5 kg (140 lb 1.6 oz)   09/12/17 59.4 kg (131 lb)   08/30/17 60.6 kg (133 lb 9.6 oz)              We Performed the Following     Pelvic and Breast Exam Procedure []        Primary Care Provider Office Phone # Fax #    Mahesh Santoyo [phone] [address] [address]        Equal Access to Services     RUBI YAN : Hadii henrique arambula hadasho Soreese, waaxda luqadaha, qaybta kaalmada adeegyada, leticia estrada . So [facility] [phone].    ATENCIÓN: Si habla español, tiene a montgomery disposición servicios gratuitos de asistencia lingüística. Llame al [phone].    We comply with applicable federal civil rights laws and [place] laws. We do not discriminate on the basis of race, color, national origin, age, disability, sex, sexual orientation, or gender identity.            Thank you!     Thank you for choosing [facility]  for your care. Our goal is always to provide you with excellent care. Hearing back from our patients is one way we can continue to improve our services. Please take a few minutes to complete the written survey that you may receive in the mail after your visit with us. Thank you!             Your Updated Medication List - Protect others around you: Learn how to safely use, store and throw away your medicines at www.disposemymeds.org.          This list is accurate as of: 11/9/17 11:59 PM.  Always use your most recent med list.                   Brand Name Dispense Instructions for use Diagnosis    calcium carbonate 1250 MG tablet    OS-MINERVA 500 mg [place]. Ca     Take 1 tablet by mouth 2 times daily        CO Q-10 PO      Take 1 capsule by mouth daily        EVENING " PRIMROSE OIL PO           FISH OIL      daily        magnesium 100 MG Caps      Take by mouth daily        VITAMIN C CR PO      Take 1 tablet by mouth daily.        VITAMIN E COMPLEX PO           VITEYES AREDS FORMULA/LUTEIN Caps      Take 1 tablet by mouth daily

## 2017-11-09 NOTE — LETTER
"11/9/2017       RE: Katie Hernandez  [address]  [facility] [address]     Dear Colleague,    Thank you for referring your patient, Katie Hernandez, to the [facility] at [facility]. Please see a copy of my visit note below.    CC/HPI:   Katie Hernandez is a 57 year old female  who presents today for her annual exam.     She continues to have intermittent breast and axilla pain. She denies new breat lumps, discharge or skin changes. She has been evaluated with US and mammogram with no findings    She has noticed worsening of pain in her LUQ. She feels when she bends over the her \"muscle get trapped\" under her ribs. She had a small 1 cm hernia in this area since her first pregnancy. She believes her abdominal musculature has \"softened\" since her hysterectomy 3 years ago.  She is unable to sleep on her left side because she will wake up with the pain. She had a CT to evaluate her abdomen this year which found gall stones and colitis but nothing to explain her LUQ pain.     She has poor sleep. She can fall asleep but will wake up easily. She tends to sleep about 6 hours a night, but she would prefer to have 9 hours.     She over the last few months she has noticed \"dizziness\". These episodes are characterized by feeling faint, like \"she doesn't have enough oxygen\". She notices her heart races, she feels short of breath and her blood pressure increases during these episodes. They have become less frequent now that she has stopped intermittent fasting.     She began menopause after her hysterectomy with bilateral salpingo oophorectomy 4 years ago. She notices episodes of night sweats 3-4 times a week. She has increased vaginal dryness which she manages with a cream.     She has begun a new diet that restricts diary, refined sugars, red meat, and gluten which she has found helpful for her IBS.     She denies headaches, unexplained weight changes, " "fevers, changes in stool color or frequency, or vaginal bleeding. She has leg swelling worse by the end of the day.     HISTORIES:  Patient Active Problem List   Diagnosis     Mild intermittent asthma     Lipoma of axilla -- left     Cyst of breast, right, solitary     Stiffness of joint, hand     Anemia     S/P total hysterectomy and bilateral salpingo-oophorectomy     Fibrocystic breast changes     Other anterior corneal dystrophies     Past Medical History:   Diagnosis Date     Abnormal stools 5/30/2013     Anemia     \"Related to heavy periods\"     Breast disorder 2014, 2017    Pain, lump     Complex endometrial hyperplasia with atypia 10/17/2013    Path dx w complete hysterectomy.      H/O colonoscopy 03/2017    nl, bx nl     Hypertension     occasional     Kidney cysts     left seen on CT     Macular degeneration 2015    Taking vitamin.     Menarche     cycles q 28 x 5 d     Menorrhagia 5/16/2007    Oct 2009 ->anemia to 7+ gms.  Resolved with OTC po FE 50 mg/d      Postpartum depression     Only for a few days     Precancerous skin lesion     excised -- abdomen. ->yrly Derm cks     S/P total hysterectomy and bilateral salpingo-oophorectomy 10/17/2013    Path: -->Complex endometrial hyperplasia with atypia.      SOB (shortness of breath) 04/2017    also elevated d dimer - ct no pe, some fibrosis or atelect, breast cysts, then est echo nl 4/17     Swelling of the ankle, feet, or leg     Ankles     Uncomplicated asthma     Childhood. Currently have sensitive airways.     Unspecified hemorrhoids without mention of complication 2007    bleeding to anemia -- hgb 8.4 and lower....     Uterine leiomyoma 5/13/2009    [place] diagnosis [place] 8/10/2011 US in this EMR confirms multiple myomas with a 12 mm endometrial stripe.  (Problem list name updated by automated process. Provider to review and confirm*     Past Surgical History:   Procedure Laterality Date     APPENDECTOMY OPEN  age 3     BIOPSY  2013    " uterine, skin     C LAP,SURG,COLECTOMY, PARTIAL, W/ANAST  age 3    tumor in colon as child (removed tumor and appendix)      SECTION   and      COLONOSCOPY      virtual     COLONOSCOPY  2013    Procedure: COLONOSCOPY;  COLONOSCOPY;  Surgeon: Db Reed MD;  Location:  GI     COLONOSCOPY N/A 3/14/2017    Procedure: COMBINED COLONOSCOPY, SINGLE OR MULTIPLE BIOPSY/POLYPECTOMY BY BIOPSY;  Surgeon: Db Reed MD;  Location:  GI     DAVINCI HYSTERECTOMY TOTAL, BILATERAL SALPINGO-OOPHORECTOMY, COMBINED  10/17/2013    Procedure: COMBINED DAVINCI HYSTERECTOMY TOTAL, SALPINGO-OOPHORECTOMY;  DaVinci Assisted Total Laparoscopic Hysterectomy/Bilateral Salpingo Oophorectomy Cystoscopy  ;  Surgeon: Zoya Vance MD;  Location: [facility]     DILATION AND CURETTAGE, OPERATIVE HYSTEROSCOPY WITH MORCELLATOR, COMBINED  2013    Procedure: COMBINED DILATION AND CURETTAGE, OPERATIVE HYSTEROSCOPY WITH MORCELLATOR;  Dilation and Curettage, Hysteroscopy, Hysteroscopic  Polypectomy with Myosure ;  Surgeon: Concepción Gamboa MD;  Location: [facility] OR     LAPAROSCOPY DIAGNOSTIC (GYN)      ectopic excision     Current Outpatient Prescriptions   Medication Sig Dispense Refill     EVENING PRIMROSE OIL PO        VITAMIN E COMPLEX PO        calcium carbonate (OS-MINERVA 500 MG [place]. [place]) 1250 MG tablet Take 1 tablet by mouth 2 times daily       Multiple Vitamins-Minerals (VITEYES AREDS FORMULA/LUTEIN) CAPS Take 1 tablet by mouth daily       magnesium 100 MG CAPS Take by mouth daily       FISH OIL daily       Coenzyme Q10 (CO Q-10 PO) Take 1 capsule by mouth daily       Ascorbic Acid (VITAMIN C CR PO) Take 1 tablet by mouth daily.       Allergies   Allergen Reactions     Hydrocodone Other (See Comments)     Pt fainted     Penicillins Hives     Doxycycline Other (See Comments)     Feeling of bugs crawling on skin and tinnitus     Hibiclens      Itching after preop washing.     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[employer]     Teaches Translation     Social History Main Topics     Smoking status: Never Smoker     Smokeless tobacco: Never Used     Alcohol use Yes      Comment: 1/2 d / mo     Drug use: No     Sexual activity: Yes     Partners: Male     Birth control/ protection: Post-menopausal      Comment: Vastectomy     Other Topics Concern     Blood Transfusions No     Caffeine Concern No     Stopped all caffiene 2nd to breast pain     Occupational Exposure No     Hobby Hazards No     Sleep Concern No     Stress Concern No     Weight Concern No     Special Diet No     Back Care No     Exercise No     45' aerobic 3xs/wk     Bike Helmet No     Seat Belt No     Social History Narrative     Family History   Problem Relation Age of Onset     DIABETES Father 75     DMII     Hypertension Father 65     Other Cancer Father      Carcinoma     CEREBROVASCULAR DISEASE Father      Hypertension Mother 45     Eye Disorder Mother      macular degeneration     Psychotic Disorder Mother      Borderline Personality Disorder ([id number])     Anxiety Disorder Mother      MENTAL ILLNESS Mother      Breast Cancer Paternal Grandmother 45      age 55     CANCER Maternal Grandmother 60     uterine/breast     Uterine Cancer Maternal Grandmother      CANCER Maternal Aunt 60     ovarian     Cancer - colorectal Maternal Aunt      possible     Breast Cancer Other      Uterine Cancer Other      MENTAL ILLNESS Other      Asthma Brother           Gyn Hx:   Patient's last menstrual period was 2013.    EXAM:  /70  Pulse 79  Ht 1.626 m (5' 4\")  Wt 63.5 kg (140 lb 1.6 oz)  LMP 2013  BMI 24.05 kg/m2  Body mass index is 24.05 kg/(m^2).    General - pleasant female in no acute distress.  Skin - many freckles and moles or rashes  Breasts- symmetrical . No dominant fixed or suspicious " masses noted.  No skin or nipple changes or axillary nodes.   Abdomen - soft, nontender, nondistended, no masses or organomegaly noted.    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    #Breast pain  No abnormalities with breast on US or mammogram. No changes appreciated on physical exam from this summer.     #Other medical issues out of scope of visit    #Health Maintenence  -Colonoscopy up to date   -Mammogram up to date  -Pap smear not indicated  -Return in 1 year for routine exam    I, Christina Elizalde MS3 am acting as a scribe for Dr. Hay.      The student acted as my scribe. I have seen, examined and counseled the patient. I have reviewed and edited the note.     Again, thank you for allowing me to participate in the care of your patient.      Sincerely,    Chelsi Hay MD

## 2017-11-09 NOTE — PROGRESS NOTES
"CC/HPI:   Katie Hernandez is a 57 year old female  who presents today for her annual exam.     She continues to have intermittent breast and axilla pain. She denies new breat lumps, discharge or skin changes. She has been evaluated with US and mammogram with no findings    She has noticed worsening of pain in her LUQ. She feels when she bends over the her \"muscle get trapped\" under her ribs. She had a small 1 cm hernia in this area since her first pregnancy. She believes her abdominal musculature has \"softened\" since her hysterectomy 3 years ago.  She is unable to sleep on her left side because she will wake up with the pain. She had a CT to evaluate her abdomen this year which found gall stones and colitis but nothing to explain her LUQ pain.     She has poor sleep. She can fall asleep but will wake up easily. She tends to sleep about 6 hours a night, but she would prefer to have 9 hours.     She over the last few months she has noticed \"dizziness\". These episodes are characterized by feeling faint, like \"she doesn't have enough oxygen\". She notices her heart races, she feels short of breath and her blood pressure increases during these episodes. They have become less frequent now that she has stopped intermittent fasting.     She began menopause after her hysterectomy with bilateral salpingo oophorectomy 4 years ago. She notices episodes of night sweats 3-4 times a week. She has increased vaginal dryness which she manages with a cream.     She has begun a new diet that restricts diary, refined sugars, red meat, and gluten which she has found helpful for her IBS.     She denies headaches, unexplained weight changes, fevers, changes in stool color or frequency, or vaginal bleeding. She has leg swelling worse by the end of the day.     HISTORIES:  Patient Active Problem List   Diagnosis     Mild intermittent asthma     Lipoma of axilla -- left     Cyst of breast, right, solitary     Stiffness of joint, hand     " "Anemia     S/P total hysterectomy and bilateral salpingo-oophorectomy     Fibrocystic breast changes     Other anterior corneal dystrophies     Past Medical History:   Diagnosis Date     Abnormal stools 2013     Anemia     \"Related to heavy periods\"     Breast disorder ,     Pain, lump     Complex endometrial hyperplasia with atypia 10/17/2013    Path dx w complete hysterectomy.      H/O colonoscopy 2017    nl, bx nl     Hypertension     occasional     Kidney cysts     left seen on CT     Macular degeneration     Taking vitamin.     Menarche     cycles q 28 x 5 d     Menorrhagia 2007    Oct 2009 ->anemia to 7+ gms.  Resolved with OTC po FE 50 mg/d      Postpartum depression     Only for a few days     Precancerous skin lesion     excised -- abdomen. ->yrly Derm cks     S/P total hysterectomy and bilateral salpingo-oophorectomy 10/17/2013    Path: -->Complex endometrial hyperplasia with atypia.      SOB (shortness of breath) 2017    also elevated d dimer - ct no pe, some fibrosis or atelect, breast cysts, then est echo nl      Swelling of the ankle, feet, or leg     Ankles     Uncomplicated asthma     Childhood. Currently have sensitive airways.     Unspecified hemorrhoids without mention of complication     bleeding to anemia -- hgb 8.4 and lower....     Uterine leiomyoma 2009    [place] diagnosis while [place] 8/10/2011 US in this EMR confirms multiple myomas with a 12 mm endometrial stripe.  (Problem list name updated by automated process. Provider to review and confirm*     Past Surgical History:   Procedure Laterality Date     APPENDECTOMY OPEN  age 3     BIOPSY      uterine, skin     C LAP,SURG,COLECTOMY, PARTIAL, W/ANAST  age 3    tumor in colon as child (removed tumor and appendix)      SECTION   and      COLONOSCOPY      virtual     COLONOSCOPY  2013    Procedure: COLONOSCOPY;  COLONOSCOPY;  Surgeon: Db Reed MD;  " Location:  GI     COLONOSCOPY N/A 3/14/2017    Procedure: COMBINED COLONOSCOPY, SINGLE OR MULTIPLE BIOPSY/POLYPECTOMY BY BIOPSY;  Surgeon: Db Reed MD;  Location:  GI     DAVINCI HYSTERECTOMY TOTAL, BILATERAL SALPINGO-OOPHORECTOMY, COMBINED  10/17/2013    Procedure: COMBINED DAVINCI HYSTERECTOMY TOTAL, SALPINGO-OOPHORECTOMY;  DaVinci Assisted Total Laparoscopic Hysterectomy/Bilateral Salpingo Oophorectomy Cystoscopy  ;  Surgeon: Zoya aVnce MD;  Location: [facility] OR     DILATION AND CURETTAGE, OPERATIVE HYSTEROSCOPY WITH MORCELLATOR, COMBINED  9/20/2013    Procedure: COMBINED DILATION AND CURETTAGE, OPERATIVE HYSTEROSCOPY WITH MORCELLATOR;  Dilation and Curettage, Hysteroscopy, Hysteroscopic  Polypectomy with Myosure ;  Surgeon: Concepción Gamboa MD;  Location: [facility] OR     LAPAROSCOPY DIAGNOSTIC (GYN)  1990    ectopic excision     Current Outpatient Prescriptions   Medication Sig Dispense Refill     EVENING PRIMROSE OIL PO        VITAMIN E COMPLEX PO        calcium carbonate (OS-MINERVA 500 MG [place]. [place]) 1250 MG tablet Take 1 tablet by mouth 2 times daily       Multiple Vitamins-Minerals (VITEYES AREDS FORMULA/LUTEIN) CAPS Take 1 tablet by mouth daily       magnesium 100 MG CAPS Take by mouth daily       FISH OIL daily       Coenzyme Q10 (CO Q-10 PO) Take 1 capsule by mouth daily       Ascorbic Acid (VITAMIN C CR PO) Take 1 tablet by mouth daily.       Allergies   Allergen Reactions     Hydrocodone Other (See Comments)     Pt fainted     Penicillins Hives     Doxycycline Other (See Comments)     Feeling of bugs crawling on skin and tinnitus     Hibiclens      Itching after preop washing.     Social History     Social History     Marital status:      Spouse name: N/A     Number of children: 2     Years of education: N/A     Occupational History     Faculty [employer]     Teaches Translation     Social History Main Topics     Smoking status: Never Smoker     Smokeless tobacco: Never Used      "Alcohol use Yes      Comment: 1/2 d / mo     Drug use: No     Sexual activity: Yes     Partners: Male     Birth control/ protection: Post-menopausal      Comment: Vastectomy     Other Topics Concern     Blood Transfusions No     Caffeine Concern No     Stopped all caffiene 2nd to breast pain     Occupational Exposure No     Hobby Hazards No     Sleep Concern No     Stress Concern No     Weight Concern No     Special Diet No     Back Care No     Exercise No     45' aerobic 3xs/wk     Bike Helmet No     Seat Belt No     Social History Narrative     Family History   Problem Relation Age of Onset     DIABETES Father 75     DMII     Hypertension Father 65     Other Cancer Father      Carcinoma     CEREBROVASCULAR DISEASE Father      Hypertension Mother 45     Eye Disorder Mother      macular degeneration     Psychotic Disorder Mother      Borderline Personality Disorder ([id number])     Anxiety Disorder Mother      MENTAL ILLNESS Mother      Breast Cancer Paternal Grandmother 45      age 55     CANCER Maternal Grandmother 60     uterine/breast     Uterine Cancer Maternal Grandmother      CANCER Maternal Aunt 60     ovarian     Cancer - colorectal Maternal Aunt      possible     Breast Cancer Other      Uterine Cancer Other      MENTAL ILLNESS Other      Asthma Brother           Gyn Hx:   Patient's last menstrual period was 2013.    EXAM:  /70  Pulse 79  Ht 1.626 m (5' 4\")  Wt 63.5 kg (140 lb 1.6 oz)  LMP 2013  BMI 24.05 kg/m2  Body mass index is 24.05 kg/(m^2).    General - pleasant female in no acute distress.  Skin - many freckles and moles or rashes  Breasts- symmetrical . No dominant fixed or suspicious masses noted.  No skin or nipple changes or axillary nodes.   Abdomen - soft, nontender, nondistended, no masses or organomegaly noted.    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    #Breast pain  No abnormalities with breast on US or mammogram. No changes appreciated on physical exam from this summer.     #Other medical issues out of scope of visit    #Health Maintenence  -Colonoscopy up to date   -Mammogram up to date  -Pap smear not indicated  -Return in 1 year for routine exam    Christina BERNABE MS3 am acting as a scribe for Dr. Hay.  Answers for HPI/ROS submitted by the patient on 10/26/2017   PRECIOUS 7 TOTAL SCORE: 3  PHQ-2 Score: 0  General Symptoms: No  Skin Symptoms: Yes  HENT Symptoms: No  EYE SYMPTOMS: No  HEART SYMPTOMS: Yes  LUNG SYMPTOMS: No  INTESTINAL SYMPTOMS: No  URINARY SYMPTOMS: No  GYNECOLOGIC SYMPTOMS: No  BREAST SYMPTOMS: Yes  SKELETAL SYMPTOMS: Yes  BLOOD SYMPTOMS: No  NERVOUS SYSTEM SYMPTOMS: Yes  MENTAL HEALTH SYMPTOMS: Yes  Changes in hair: No  Changes in moles/birth marks: No  Itching: Yes  Rashes: No  Changes in nails: Yes  Acne: No  Hair in places you don't want it: No  Change in facial hair: No  Warts: No  Non-healing sores: No  Scarring: No  Flaking of skin: No  Color changes of hands/feet in cold : No  Sun sensitivity: No  Skin thickening: No  Chest pain or pressure: Yes  Fast or irregular heartbeat: No  Pain in legs with walking: Yes  Swelling in feet or ankles: Yes  Trouble breathing while lying down: No  Fingers or Toes appear blue: No  High blood pressure: Yes  Low blood pressure: Yes  Fainting: No  Murmurs: No  Chest pain on exertion: No  Chest pain at rest: Yes  Cramping pain in leg during exercise: No  Pacemaker: No  Varicose veins: No  Edema or swelling: Yes  Fast heart beat: No  Wake up at night with shortness of breath: No  Heart flutters: No  Light-headedness: Yes  Exercise intolerance: No  Back pain: No  Muscle aches: No  Neck pain: No  Swollen joints: No  Joint pain: Yes  Bone pain: No  Muscle cramps: No  Muscle weakness: No  Joint stiffness: Yes  Bone fracture: No  Trouble with coordination: No  Dizziness or trouble with balance:  No  Fainting or black-out spells: No  Memory loss: No  Headache: No  Seizures: No  Speech problems: No  Tingling: Yes  Tremor: No  Weakness: No  Difficulty walking: No  Paralysis: No  Numbness: No  Discharge: No  Lumps: Yes  Pain: Yes  Nipple retraction: No  Nervous or Anxious: Yes  Depression: No  Trouble sleeping: Yes  Trouble thinking or concentrating: Yes  Mood changes: No  Panic attacks: No    The student acted as my scribe. I have seen, examined and counseled the patient. I have reviewed and edited the note.   Chelsi Hay

## 2017-12-14 ENCOUNTER — OFFICE VISIT (OUTPATIENT)
Dept: FAMILY MEDICINE | Facility: CLINIC | Age: 57
End: 2017-12-14
Payer: COMMERCIAL

## 2017-12-14 VITALS
TEMPERATURE: 97.9 F | HEART RATE: 81 BPM | HEIGHT: 64 IN | BODY MASS INDEX: 23.9 KG/M2 | SYSTOLIC BLOOD PRESSURE: 101 MMHG | OXYGEN SATURATION: 97 % | DIASTOLIC BLOOD PRESSURE: 68 MMHG | WEIGHT: 140 LBS

## 2017-12-14 DIAGNOSIS — J45.901 EXACERBATION OF ASTHMA, UNSPECIFIED ASTHMA SEVERITY, UNSPECIFIED WHETHER PERSISTENT: ICD-10-CM

## 2017-12-14 DIAGNOSIS — J06.9 UPPER RESPIRATORY TRACT INFECTION, UNSPECIFIED TYPE: Primary | ICD-10-CM

## 2017-12-14 PROCEDURE — 99213 OFFICE O/P EST LOW 20 MIN: CPT | Performed by: INTERNAL MEDICINE

## 2017-12-14 RX ORDER — AZITHROMYCIN 250 MG/1
TABLET, FILM COATED ORAL
Qty: 6 TABLET | Refills: 1 | Status: SHIPPED | OUTPATIENT
Start: 2017-12-14 | End: 2018-02-06

## 2017-12-14 RX ORDER — ALBUTEROL SULFATE 90 UG/1
2 AEROSOL, METERED RESPIRATORY (INHALATION) EVERY 6 HOURS PRN
Qty: 1 INHALER | Refills: 3 | Status: SHIPPED | OUTPATIENT
Start: 2017-12-14 | End: 2018-02-06

## 2017-12-14 RX ORDER — METHYLPREDNISOLONE 4 MG
TABLET, DOSE PACK ORAL
Qty: 21 TABLET | Refills: 1 | Status: SHIPPED | OUTPATIENT
Start: 2017-12-14 | End: 2018-02-06

## 2017-12-14 NOTE — NURSING NOTE
"Chief Complaint   Patient presents with     URI       Initial /68  Pulse 81  Temp 97.9  F (36.6  C) (Oral)  Ht 5' 4\" (1.626 m)  Wt 140 lb (63.5 kg)  LMP 08/20/2013  SpO2 97%  Breastfeeding? No  BMI 24.03 kg/m2 Estimated body mass index is 24.03 kg/(m^2) as calculated from the following:    Height as of this encounter: 5' 4\" (1.626 m).    Weight as of this encounter: 140 lb (63.5 kg).  Medication Reconciliation: complete   Fe Kruger CMA      "

## 2017-12-14 NOTE — MR AVS SNAPSHOT
"              After Visit Summary   12/14/2017    Katie Hernandez    MRN: [id number]           Patient Information     Date Of Birth          1960        Visit Information        Provider Department      12/14/2017 5:00 PM Senait Holbrook MD [facility]        Today's Diagnoses     Upper respiratory tract infection, unspecified type    -  1    Exacerbation of asthma, unspecified asthma severity, unspecified whether persistent           Follow-ups after your visit        Who to contact     If you have questions or need follow up information about today's clinic visit or your schedule please contact [facility] directly at [phone].  Normal or non-critical lab and imaging results will be communicated to you by MyChart, letter or phone within 4 business days after the clinic has received the results. If you do not hear from us within 7 days, please contact the clinic through [employer] or phone. If you have a critical or abnormal lab result, we will notify you by phone as soon as possible.  Submit refill requests through [employer] or call your pharmacy and they will forward the refill request to us. Please allow 3 business days for your refill to be completed.          Additional Information About Your Visit        MyChart Information     [employer] gives you secure access to your electronic health record. If you see a primary care provider, you can also send messages to your care team and make appointments. If you have questions, please call your primary care clinic.  If you do not have a primary care provider, please call [phone] and they will assist you.        Care EveryWhere ID     This is your Care EveryWhere ID. This could be used by other organizations to access your [place] medical records  [id number]        Your Vitals Were     Pulse Temperature Height Last Period Pulse Oximetry Breastfeeding?    81 97.9  F (36.6  C) (Oral) 5' 4\" (1.626 m) 08/20/2013 97% No    BMI (Body " Mass Index)                   24.03 kg/m2            Blood Pressure from Last 3 Encounters:   12/14/17 101/68   11/09/17 105/70   09/12/17 103/70    Weight from Last 3 Encounters:   12/14/17 140 lb (63.5 kg)   11/09/17 140 lb 1.6 oz (63.5 kg)   09/12/17 131 lb (59.4 kg)              Today, you had the following     No orders found for display         Today's Medication Changes          These changes are accurate as of: 12/14/17 11:59 PM.  If you have any questions, ask your nurse or doctor.               Start taking these medicines.        Dose/Directions    albuterol 108 (90 BASE) MCG/ACT Inhaler   Commonly known as:  PROAIR HFA/PROVENTIL HFA/VENTOLIN HFA   Used for:  Exacerbation of asthma, unspecified asthma severity, unspecified whether persistent, Upper respiratory tract infection, unspecified type   Started by:  Senait Holbrook MD        Dose:  2 puff   Inhale 2 puffs into the lungs every 6 hours as needed for shortness of breath / dyspnea or wheezing   Quantity:  1 Inhaler   Refills:  3       azithromycin 250 MG tablet   Commonly known as:  ZITHROMAX   Used for:  Upper respiratory tract infection, unspecified type, Exacerbation of asthma, unspecified asthma severity, unspecified whether persistent   Started by:  Senait Holbrook MD        Two tablets first day, then one tablet daily for four days.   Quantity:  6 tablet   Refills:  1       fluticasone 50 MCG/BLIST Aepb   Commonly known as:  FLOVENT DISKUS   Used for:  Upper respiratory tract infection, unspecified type, Exacerbation of asthma, unspecified asthma severity, unspecified whether persistent   Started by:  Senait Holbrook MD        Dose:  1 puff   Inhale 1 puff into the lungs 2 times daily   Quantity:  3 Inhaler   Refills:  3       methylPREDNISolone 4 MG tablet   Commonly known as:  MEDROL DOSEPAK   Used for:  Upper respiratory tract infection, unspecified type, Exacerbation of asthma, unspecified asthma severity, unspecified whether  persistent   Started by:  Senait Holbrook MD        Follow package instructions   Quantity:  21 tablet   Refills:  1            Where to get your medicines      Some of these will need a paper prescription and others can be bought over the counter.  Ask your nurse if you have questions.     Bring a paper prescription for each of these medications     albuterol 108 (90 BASE) MCG/ACT Inhaler    azithromycin 250 MG tablet    fluticasone 50 MCG/BLIST Aepb    methylPREDNISolone 4 MG tablet                Primary Care Provider Office Phone # Fax #    Mahesh Remi Santoyo [phone] [address]  [facility] [address]        Equal Access to Services     [facility]: Hadii henrique Rodriguez, waaxda barak, qaybta kaalmada joni, leticia estrada . So [facility] [phone].    ATENCIÓN: Si habla español, tiene a montgomery disposición servicios gratuitos de asistencia lingüística. [facility] [phone].    We comply with applicable federal civil rights laws and [place] laws. We do not discriminate on the basis of race, color, national origin, age, disability, sex, sexual orientation, or gender identity.            Thank you!     Thank you for choosing [facility]  for your care. Our goal is always to provide you with excellent care. Hearing back from our patients is one way we can continue to improve our services. Please take a few minutes to complete the written survey that you may receive in the mail after your visit with us. Thank you!             Your Updated Medication List - Protect others around you: Learn how to safely use, store and throw away your medicines at www.disposemymeds.org.          This list is accurate as of: 12/14/17 11:59 PM.  Always use your most recent med list.                   Brand Name Dispense Instructions for use Diagnosis    albuterol 108 (90 BASE) MCG/ACT Inhaler    PROAIR HFA/PROVENTIL HFA/VENTOLIN HFA    1 Inhaler     Inhale 2 puffs into the lungs every 6 hours as needed for shortness of breath / dyspnea or wheezing    Exacerbation of asthma, unspecified asthma severity, unspecified whether persistent, Upper respiratory tract infection, unspecified type       azithromycin 250 MG tablet    ZITHROMAX    6 tablet    Two tablets first day, then one tablet daily for four days.    Upper respiratory tract infection, unspecified type, Exacerbation of asthma, unspecified asthma severity, unspecified whether persistent       calcium carbonate 1250 MG tablet    OS-MINERVA 500 mg [place]. [place]     Take 1 tablet by mouth 2 times daily        CO Q-10 PO      Take 1 capsule by mouth daily        EVENING PRIMROSE OIL PO           FISH OIL      daily        fluticasone 50 MCG/BLIST Aepb    FLOVENT DISKUS    3 Inhaler    Inhale 1 puff into the lungs 2 times daily    Upper respiratory tract infection, unspecified type, Exacerbation of asthma, unspecified asthma severity, unspecified whether persistent       magnesium 100 MG Caps      Take by mouth daily        methylPREDNISolone 4 MG tablet    MEDROL DOSEPAK    21 tablet    Follow package instructions    Upper respiratory tract infection, unspecified type, Exacerbation of asthma, unspecified asthma severity, unspecified whether persistent       VITAMIN C CR PO      Take 1 tablet by mouth daily.        VITAMIN E COMPLEX PO           VITEYES AREDS FORMULA/LUTEIN Caps      Take 1 tablet by mouth daily

## 2017-12-14 NOTE — PROGRESS NOTES
"  SUBJECTIVE:   Katie Hernandez is a 57 year old female who presents to clinic today for the following health issues:      RESPIRATORY SYMPTOMS      Duration: 1 week    Description  nasal congestion, facial pain/pressure and cough    Severity: moderate    Accompanying signs and symptoms: None    History (predisposing factors):  none    Precipitating or alleviating factors: None    Therapies tried and outcome:  oral decongestant acetaminophen        Current Medications:     Current Outpatient Prescriptions   Medication Sig Dispense Refill     fluticasone (FLOVENT DISKUS) 50 MCG/BLIST AEPB Inhale 1 puff into the lungs 2 times daily 3 Inhaler 3     albuterol (PROAIR HFA/PROVENTIL HFA/VENTOLIN HFA) 108 (90 BASE) MCG/ACT Inhaler Inhale 2 puffs into the lungs every 6 hours as needed for shortness of breath / dyspnea or wheezing 1 Inhaler 3     azithromycin (ZITHROMAX) 250 MG tablet Two tablets first day, then one tablet daily for four days. 6 tablet 1     methylPREDNISolone (MEDROL DOSEPAK) 4 MG tablet Follow package instructions 21 tablet 1     EVENING PRIMROSE OIL PO        VITAMIN E COMPLEX PO        calcium carbonate (OS-MINERVA 500 MG [place]. [place]) 1250 MG tablet Take 1 tablet by mouth 2 times daily       Multiple Vitamins-Minerals (VITEYES AREDS FORMULA/LUTEIN) CAPS Take 1 tablet by mouth daily       magnesium 100 MG CAPS Take by mouth daily       FISH OIL daily       Coenzyme Q10 (CO Q-10 PO) Take 1 capsule by mouth daily       Ascorbic Acid (VITAMIN C CR PO) Take 1 tablet by mouth daily.           Allergies:      Allergies   Allergen Reactions     Hydrocodone Other (See Comments)     Pt fainted     Penicillins Hives     Doxycycline Other (See Comments)     Feeling of bugs crawling on skin and tinnitus     Hibiclens      Itching after preop washing.            Past Medical History:     Past Medical History:   Diagnosis Date     Abnormal stools 5/30/2013     Anemia     \"Related to heavy periods\"     Breast disorder 2014, " 2017    Pain, lump     Complex endometrial hyperplasia with atypia 10/17/2013    Path dx w complete hysterectomy.      H/O colonoscopy 2017    nl, bx nl     Hypertension     occasional     Kidney cysts     left seen on CT     Macular degeneration     Taking vitamin.     Menarche     cycles q 28 x 5 d     Menorrhagia 2007    Oct 2009 ->anemia to 7+ gms.  Resolved with OTC po FE 50 mg/d      Postpartum depression     Only for a few days     Precancerous skin lesion     excised -- abdomen. ->yrly Derm cks     S/P total hysterectomy and bilateral salpingo-oophorectomy 10/17/2013    Path: -->Complex endometrial hyperplasia with atypia.      SOB (shortness of breath) 2017    also elevated d dimer - ct no pe, some fibrosis or atelect, breast cysts, then est echo nl      Swelling of the ankle, feet, or leg     Ankles     Uncomplicated asthma     Childhood. Currently have sensitive airways.     Unspecified hemorrhoids without mention of complication     bleeding to anemia -- hgb 8.4 and lower....     Uterine leiomyoma 2009    [place] diagnosis [place] 8/10/2011 US in this EMR confirms multiple myomas with a 12 mm endometrial stripe.  (Problem list name updated by automated process. Provider to review and confirm*         Past Surgical History:     Past Surgical History:   Procedure Laterality Date     APPENDECTOMY OPEN  age 3     BIOPSY      uterine, skin     C LAP,SURG,COLECTOMY, PARTIAL, W/ANAST  age 3    tumor in colon as child (removed tumor and appendix)      SECTION   and      COLONOSCOPY      virtual     COLONOSCOPY  2013    Procedure: COLONOSCOPY;  COLONOSCOPY;  Surgeon: Db Reed MD;  Location:  GI     COLONOSCOPY N/A 3/14/2017    Procedure: COMBINED COLONOSCOPY, SINGLE OR MULTIPLE BIOPSY/POLYPECTOMY BY BIOPSY;  Surgeon: Db Reed MD;  Location: [facility]     DAVINCI HYSTERECTOMY TOTAL, BILATERAL SALPINGO-OOPHORECTOMY,  COMBINED  10/17/2013    Procedure: COMBINED DAVINCI HYSTERECTOMY TOTAL, SALPINGO-OOPHORECTOMY;  DaVinci Assisted Total Laparoscopic Hysterectomy/Bilateral Salpingo Oophorectomy Cystoscopy  ;  Surgeon: Zoya Vance MD;  Location: [facility] OR     DILATION AND CURETTAGE, OPERATIVE HYSTEROSCOPY WITH MORCELLATOR, COMBINED  2013    Procedure: COMBINED DILATION AND CURETTAGE, OPERATIVE HYSTEROSCOPY WITH MORCELLATOR;  Dilation and Curettage, Hysteroscopy, Hysteroscopic  Polypectomy with Myosure ;  Surgeon: Concepción Gamboa MD;  Location: [facility] OR     LAPAROSCOPY DIAGNOSTIC (GYN)      ectopic excision         Family Medical History:     Family History   Problem Relation Age of Onset     DIABETES Father 75     DMII     Hypertension Father 65     Other Cancer Father      Carcinoma     CEREBROVASCULAR DISEASE Father      Hypertension Mother 45     Eye Disorder Mother      macular degeneration     Psychotic Disorder Mother      Borderline Personality Disorder ([id number])     Anxiety Disorder Mother      MENTAL ILLNESS Mother      Breast Cancer Paternal Grandmother 45      age 55     CANCER Maternal Grandmother 60     uterine/breast     Uterine Cancer Maternal Grandmother      CANCER Maternal Aunt 60     ovarian     Cancer - colorectal Maternal Aunt      possible     Breast Cancer Other      Uterine Cancer Other      MENTAL ILLNESS Other      Asthma Brother          Social History:     Social History     Social History     Marital status:      Spouse name: N/A     Number of children: 2     Years of education: N/A     Occupational History     Faculty [employer]     [employer]aches [employer]     Social History Main Topics     Smoking status: Never Smoker     Smokeless tobacco: Never Used     Alcohol use Yes      Comment: 1/2 d / mo     Drug use: No     Sexual activity: Yes     Partners: Male     Birth control/ protection: Post-menopausal      Comment: Vastectomy     Other Topics Concern     Blood Transfusions No      "Caffeine Concern No     Stopped all caffiene 2nd to breast pain     Occupational Exposure No     Hobby Hazards No     Sleep Concern No     Stress Concern No     Weight Concern No     Special Diet No     Back Care No     Exercise No     45' aerobic 3xs/wk     Bike Helmet No     Seat Belt No     Social History Narrative           Review of System:     Constitutional: Negative for fever or chills  Skin: Negative for rashes  Ears/Nose/Throat: Positive for nasal congestion, sore throat  Respiratory: Positive for cough, wheezing  Cardiovascular: Negative for chest pain  Gastrointestinal: Negative for nausea, vomiting  Genitourinary: Negative for dysuria, hematuria  Musculoskeletal: Negative for myalgias  Neurologic: Negative for headaches  Psychiatric: Negative for depression, anxiety  Hematologic/Lymphatic/Immunologic: Negative  Endocrine: Negative  Behavioral: Negative for tobacco use       Physical Exam:   /68  Pulse 81  Temp 97.9  F (36.6  C) (Oral)  Ht 5' 4\" (1.626 m)  Wt 140 lb (63.5 kg)  LMP 08/20/2013  SpO2 97%  Breastfeeding? No  BMI 24.03 kg/m2    GENERAL: alert and no distress  EYES: eyes grossly normal to inspection, and conjunctivae and sclerae normal  HENT: Normocephalic atraumatic. Nose and mouth without ulcers or lesions. Nasal congestion present.  NECK: supple  RESP: cough, wheezing symptoms present  CV: regular rate and rhythm, normal S1 S2  LYMPH: no peripheral edema   ABDOMEN: nondistended  MS: no gross musculoskeletal defects noted  SKIN: no suspicious lesions or rashes  NEURO: Alert & Oriented x 3.   PSYCH: mentation appears normal, affect normal        ASSESSMENT/PLAN:       (J06.9) Upper respiratory tract infection, unspecified type  (primary encounter diagnosis)  (J45.901) Exacerbation of asthma, unspecified asthma severity, unspecified whether persistent  Comment: several days of acute asthma exacerbation symptoms following recent URI episode  Plan: I have prescribed fluticasone " (FLOVENT DISKUS) 50 MCG/BLIST AEPB, albuterol (PROAIR HFA/PROVENTIL HFA/VENTOLIN HFA) 108 (90 BASE) MCG/ACT Inhaler, azithromycin (ZITHROMAX) 250 MG tablet, methylPREDNISolone (MEDROL DOSEPAK) 4 MG tablet for treatment today.    Follow Up Plan:     Patient is instructed to return to Internal Medicine clinic for follow-up visit in 1 week.        Senait Holbrook MD  Internal Medicine  [facility]

## 2017-12-15 ENCOUNTER — TELEPHONE (OUTPATIENT)
Dept: FAMILY MEDICINE | Facility: CLINIC | Age: 57
End: 2017-12-15

## 2017-12-15 DIAGNOSIS — R05.9 COUGH: Primary | ICD-10-CM

## 2017-12-15 NOTE — TELEPHONE ENCOUNTER
Reason for Call:  Other prescription    Detailed comments:   Pt following up,  Told her pharmacy has already called    Would like a call when this is figured out, hopes to be tonight or she won't get medication     Phone Number Patient can be reached at: [phone]    Best Time: anytime    Can we leave a detailed message on this number? YES    Call taken on 12/15/2017 at 4:01 PM by Sofi Neri

## 2017-12-15 NOTE — TELEPHONE ENCOUNTER
Reason for Call:  Medication or medication refill:    Do you use a [facility]?  Name of the pharmacy and phone number for the current request:     [facility] #[id number] - [place], [place] - [address]        Name of the medication requested: Asmanex or Qvar     Other request: fluticasone (FLOVENT DISKUS) 50 MCG/BLIST AEPB is not covered under patients insurance. Pharmacy states the above medications are covered and are requesting today    Spoke with Sarah from [facility]  Ph. [phone]      Call taken on 12/15/2017 at 3:38 PM by Deloris Ortez

## 2018-01-15 ENCOUNTER — NURSE TRIAGE (OUTPATIENT)
Dept: NURSING | Facility: CLINIC | Age: 58
End: 2018-01-15

## 2018-01-15 NOTE — TELEPHONE ENCOUNTER
Nausea/ ate some chinese garlic 48 hours ago/feels chilled but no fever/no pain/ did have 4 stools this AM but no actual diarrhea/has an appointment for February/ wants home care until then   Yahir Duffy RN [phone]  Additional Information    Negative: Shock suspected (e.g., cold/pale/clammy skin, too weak to stand, low BP, rapid pulse)    Negative: Sounds like a life-threatening emergency to the triager    Negative: [1] Nausea or vomiting AND [2] pregnancy < 20 weeks    Negative: Menstrual Period - Missed or Late (i.e., pregnancy suspected)    Negative: Heat exhaustion suspected (i.e., dehydration from heat exposure)    Negative: Motion sickness suspected (i.e., nausea with car, plane, boat, or train travel)    Negative: Anxiety or stress suspected (i.e., nausea with anxiety attacks or stressful situations)    Negative: Traumatic Brain Injury (TBI) suspected    Negative: Vomiting occurs    Negative: Other symptom is present, see that guideline.  (e.g., chest pain, headache, dizziness, abdominal pain, colds, sore throat, etc.).    Negative: Unable to walk, or can only walk with assistance (e.g., requires support)    Negative: Difficulty breathing    Negative: [1] Insulin-dependent diabetes (Type I) AND [2] glucose > 400 mg/dl (22 mmol/l)    Negative: [1] Drinking very little AND [2] dehydration suspected (e.g., no urine > 12 hours, very dry mouth, very lightheaded)    Negative: Patient sounds very sick or weak to the triager    Negative: Fever > 104 F (40 C)    Negative: [1] Fever > 101 F (38.3 C) AND [2] age > 60    Negative: [1] Fever > 101 F (38.3 C) AND [2] bedridden (e.g., nursing home patient, CVA, chronic illness, recovering from surgery)    Negative: [1] Fever > 100.5 F (38.1 C) AND [2] diabetes mellitus or weak immune system (e.g., HIV positive, cancer chemo, splenectomy, organ transplant, chronic steroids)    Negative: Taking any of the following medications: digoxin (Lanoxin), lithium,  theophylline, phenytoin (Dilantin)    Negative: Yellowish color of the skin or white of the eye (i.e., jaundice)    Negative: Fever present > 3 days (72 hours)    Negative: Receiving cancer chemotherapy medication    Negative: Taking prescription medication that could cause nausea (e.g., narcotics/opiates, antibiotics, OCPs, many others)    Negative: Nausea persists > 1 week    Negative: Alcohol or drug abuse, known or suspected    Negative: Nausea is a chronic symptom (recurrent or ongoing AND present > 4 weeks)    Unexplained nausea  (all triage questions negative)    Protocols used: NAUSEA-ADULT-

## 2018-02-06 ENCOUNTER — OFFICE VISIT (OUTPATIENT)
Dept: FAMILY MEDICINE | Facility: CLINIC | Age: 58
End: 2018-02-06
Payer: COMMERCIAL

## 2018-02-06 VITALS
DIASTOLIC BLOOD PRESSURE: 69 MMHG | TEMPERATURE: 98.1 F | BODY MASS INDEX: 23.73 KG/M2 | SYSTOLIC BLOOD PRESSURE: 103 MMHG | WEIGHT: 139 LBS | OXYGEN SATURATION: 97 % | HEART RATE: 78 BPM | HEIGHT: 64 IN

## 2018-02-06 DIAGNOSIS — R20.2 PARESTHESIA: Primary | ICD-10-CM

## 2018-02-06 PROCEDURE — 99213 OFFICE O/P EST LOW 20 MIN: CPT | Performed by: INTERNAL MEDICINE

## 2018-02-06 NOTE — PROGRESS NOTES
"The patient is here for which she describes as internal tremors.  For 6 months she has had these off and on but they are worsening.  She has them daily now.  They last for a few seconds then resolved.  There is no pattern.  She does not shake visibly on the outside and has no external tremor.  She is occasionally had a headache but has not had dizziness.  She has had no fevers or weight loss.  She is not taking a lot of supplements.  No history of thyroid disease and her last labs in September were normal.  She does admit to stress.  However, she does not feel that this is stress related.  She has minimal caffeine.  She has no focal neurologic symptoms on review of systems.    For a couple years off-and-on she has had intermittent GI symptoms with nausea and fatigue.  She had an episode a few weeks ago but this is since resolved.  She believes this is related to garlic from [place].  In the past she had a CT because of this and this showed colitis of the descending colon but the follow-up colonoscopy and biopsy were all normal.  The symptoms have resolved.    Past Medical History:   Diagnosis Date     Abnormal stools 5/30/2013     Anemia     \"Related to heavy periods\"     Breast disorder 2014, 2017    Pain, lump     Complex endometrial hyperplasia with atypia 10/17/2013    Path dx w complete hysterectomy.      H/O colonoscopy 03/2017    nl, bx nl     Kidney cysts     left seen on CT     Macular degeneration 2015    Taking vitamin.     Menarche     cycles q 28 x 5 d     Menorrhagia 5/16/2007    Oct 2009 ->anemia to 7+ gms.  Resolved with OTC po FE 50 mg/d      Postpartum depression     Only for a few days     Precancerous skin lesion     excised -- abdomen. ->yrly Derm cks     S/P total hysterectomy and bilateral salpingo-oophorectomy 10/17/2013    Path: -->Complex endometrial hyperplasia with atypia.      SOB (shortness of breath) 04/2017    also elevated d dimer - ct no pe, some fibrosis or atelect, breast cysts, " then est echo nl      Swelling of the ankle, feet, or leg     Ankles     Uncomplicated asthma     Childhood. Currently have sensitive airways.     Unspecified hemorrhoids without mention of complication     bleeding to anemia -- hgb 8.4 and lower....     Uterine leiomyoma 2009    [place] diagnosis while in [place] 8/10/2011 US in this EMR confirms multiple myomas with a 12 mm endometrial stripe.  (Problem list name updated by automated process. Provider to review and confirm*     Past Surgical History:   Procedure Laterality Date     APPENDECTOMY OPEN  age 3     BIOPSY      uterine, skin     C LAP,SURG,COLECTOMY, PARTIAL, W/ANAST  age 3    tumor in colon as child (removed tumor and appendix)      SECTION   and      COLONOSCOPY      virtual     COLONOSCOPY  2013    Procedure: COLONOSCOPY;  COLONOSCOPY;  Surgeon: Db Reed MD;  Location:  GI     COLONOSCOPY N/A 3/14/2017    Procedure: COMBINED COLONOSCOPY, SINGLE OR MULTIPLE BIOPSY/POLYPECTOMY BY BIOPSY;  Surgeon: Db Reed MD;  Location:  GI     DAVINCI HYSTERECTOMY TOTAL, BILATERAL SALPINGO-OOPHORECTOMY, COMBINED  10/17/2013    Procedure: COMBINED DAVINCI HYSTERECTOMY TOTAL, SALPINGO-OOPHORECTOMY;  DaVinci Assisted Total Laparoscopic Hysterectomy/Bilateral Salpingo Oophorectomy Cystoscopy  ;  Surgeon: Zoya Vance MD;  Location: [facility] OR     DILATION AND CURETTAGE, OPERATIVE HYSTEROSCOPY WITH MORCELLATOR, COMBINED  2013    Procedure: COMBINED DILATION AND CURETTAGE, OPERATIVE HYSTEROSCOPY WITH MORCELLATOR;  Dilation and Curettage, Hysteroscopy, Hysteroscopic  Polypectomy with Myosure ;  Surgeon: Concepción Gamboa MD;  Location:  OR     LAPAROSCOPY DIAGNOSTIC (GYN)      ectopic excision     Social History     Social History     Marital status:      Spouse name: N/A     Number of children: 2     Years of education: N/A     Occupational History     Faculty [employer]      "[employer]     Social History Main Topics     Smoking status: Never Smoker     Smokeless tobacco: Never Used     Alcohol use Yes      Comment: 1/2 d / mo     Drug use: No     Sexual activity: Yes     Partners: Male     Birth control/ protection: Post-menopausal      Comment: Vastectomy     Other Topics Concern     Blood Transfusions No     Caffeine Concern No     Stopped all caffiene 2nd to breast pain     Occupational Exposure No     Hobby Hazards No     Sleep Concern No     Stress Concern No     Weight Concern No     Special Diet No     Back Care No     Exercise No     45' aerobic 3xs/wk     Bike Helmet No     Seat Belt No     Social History Narrative     Current Outpatient Prescriptions   Medication Sig Dispense Refill     GLUTATHIONE PO        COLLAGEN PO        VITAMIN E COMPLEX PO        calcium carbonate (OS-MINERVA 500 MG [place]. [place]) 1250 MG tablet Take 1 tablet by mouth 2 times daily       Multiple Vitamins-Minerals (VITEYES AREDS FORMULA/LUTEIN) CAPS Take 1 tablet by mouth daily       magnesium 100 MG CAPS Take by mouth daily       Coenzyme Q10 (CO Q-10 PO) Take 1 capsule by mouth daily       Ascorbic Acid (VITAMIN C CR PO) Take 1 tablet by mouth daily.       Allergies   Allergen Reactions     Hydrocodone Other (See Comments)     Pt fainted     Penicillins Hives     Doxycycline Other (See Comments)     Feeling of bugs crawling on skin and tinnitus     Hibiclens      Itching after preop washing.     FAMILY HISTORY NOTED AND REVIEWED    REVIEW OF SYSTEMS: above    PHYSICAL EXAM    /69  Pulse 78  Temp 98.1  F (36.7  C) (Oral)  Ht 5' 4\" (1.626 m)  Wt 139 lb (63 kg)  LMP 08/20/2013  SpO2 97%  Breastfeeding? No  BMI 23.86 kg/m2    Patient appears non toxic  Mouth - tongue midline and within normal limits, mucous membranes and posterior pharynx within normal limits, no lesions seen.  Neck - no masses, lesions or tenderness  Nodes - no supraclavicular, cervical or axially adenopathy .  Lungs - " clear, normal flow  Cardiovascular - regular rate and rhythm, no murmer, rub or gallop, no jvp or edema, carotids within normal limits, no bruits.  Abdomen - normal active bowel sounds, soft, non tender, no masses, guarding or rebound, no hepatosplenomegaly  No visible tremor    Labs noted from before    ASSESSMENT:  ?internal tremor, not sure what to make of this, doubt med, cv, neuro, anemia or thyroid, cause not clear.  I discussed with patient stress and anxiety but she does not feel it is this    PLAN:  Neuro bernabeal      Mahesh Santoyo M.D.

## 2018-02-06 NOTE — NURSING NOTE
"Chief Complaint   Patient presents with     Allergic Reaction     possible chemial reaction from food from [place]       Initial /69  Pulse 78  Temp 98.1  F (36.7  C) (Oral)  Ht 5' 4\" (1.626 m)  Wt 139 lb (63 kg)  LMP 08/20/2013  SpO2 97%  Breastfeeding? No  BMI 23.86 kg/m2 Estimated body mass index is 23.86 kg/(m^2) as calculated from the following:    Height as of this encounter: 5' 4\" (1.626 m).    Weight as of this encounter: 139 lb (63 kg).  Medication Reconciliation: complete   Fe Kruger CMA      "

## 2018-02-06 NOTE — MR AVS SNAPSHOT
After Visit Summary   2/6/2018    Katie Hernandez    MRN: [id number]           Patient Information     Date Of Birth          1960        Visit Information        Provider Department      2/6/2018 10:30 AM Mahesh Santoyo MD [facility]        Today's Diagnoses     Paresthesia    -  1       Follow-ups after your visit        Additional Services     NEUROLOGY ADULT REFERRAL       Your provider has referred you for the following:   Consult at [facility]: [facility] [phone]   [email or web address]    Please be aware that coverage of these services is subject to the terms and limitations of your health insurance plan.  Call member services at your health plan with any benefit or coverage questions.      Please bring the following with you to your appointment:    (1) Any X-Rays, CTs or MRIs which have been performed.  Contact the facility where they were done to arrange for  prior to your scheduled appointment.    (2) List of current medications  (3) This referral request   (4) Any documents/labs given to you for this referral                  Who to contact     If you have questions or need follow up information about today's clinic visit or your schedule please contact [facility] directly at [phone].  Normal or non-critical lab and imaging results will be communicated to you by MyChart, letter or phone within 4 business days after the clinic has received the results. If you do not hear from us within 7 days, please contact the clinic through MyChart or phone. If you have a critical or abnormal lab result, we will notify you by phone as soon as possible.  Submit refill requests through [facility] or call your pharmacy and they will forward the refill request to us. Please allow 3 business days for your refill to be completed.          Additional Information About Your Visit        MyChart  "Information     Rita gives you secure access to your electronic health record. If you see a primary care provider, you can also send messages to your care team and make appointments. If you have questions, please call your primary care clinic.  If you do not have a primary care provider, please call [phone] and they will assist you.        Care EveryWhere ID     This is your Care EveryWhere ID. This could be used by other organizations to access your [place] medical records  [id number]        Your Vitals Were     Pulse Temperature Height Last Period Pulse Oximetry Breastfeeding?    78 98.1  F (36.7  C) (Oral) 5' 4\" (1.626 m) 08/20/2013 97% No    BMI (Body Mass Index)                   23.86 kg/m2            Blood Pressure from Last 3 Encounters:   02/06/18 103/69   12/14/17 101/68   11/09/17 105/70    Weight from Last 3 Encounters:   02/06/18 139 lb (63 kg)   12/14/17 140 lb (63.5 kg)   11/09/17 140 lb 1.6 oz (63.5 kg)              We Performed the Following     NEUROLOGY ADULT REFERRAL          Today's Medication Changes          These changes are accurate as of 2/6/18 10:56 AM.  If you have any questions, ask your nurse or doctor.               Stop taking these medicines if you haven't already. Please contact your care team if you have questions.     albuterol 108 (90 BASE) MCG/ACT Inhaler   Commonly known as:  PROAIR HFA/PROVENTIL HFA/VENTOLIN HFA   Stopped by:  Mahesh Santoyo MD           azithromycin 250 MG tablet   Commonly known as:  ZITHROMAX   Stopped by:  Mahesh Santoyo MD           beclomethasone 40 MCG/ACT Inhaler   Commonly known as:  QVAR   Stopped by:  Mahesh Santoyo MD           EVENING PRIMROSE OIL PO   Stopped by:  Mahesh Santoyo MD           FISH OIL   Stopped by:  Mahesh Santoyo MD           fluticasone 50 MCG/BLIST Aepb   Commonly known as:  FLOVENT DISKUS   Stopped by:  Mahesh Santoyo MD           methylPREDNISolone 4 MG tablet   Commonly " known as:  MEDROL DOSEPAK   Stopped by:  Mahesh Santoyo MD                    Primary Care Provider Office Phone # Fax #    Mahesh Santoyo [phone] [address] [address]        Equal Access to Services     [facility]: Hadii aad ku hadasho Soomaali, waaxda luqadaha, qaybta kaalmada adeegyada, waxay idiin hayaan adeeg khabdullahi lacherin . So [facility] [phone].    ATENCIÓN: Si habla español, tiene a montgomery disposición servicios gratuitos de asistencia lingüística. Llame al [phone].    We comply with applicable federal civil rights laws and [place] laws. We do not discriminate on the basis of race, color, national origin, age, disability, sex, sexual orientation, or gender identity.            Thank you!     Thank you for choosing [facility]  for your care. Our goal is always to provide you with excellent care. Hearing back from our patients is one way we can continue to improve our services. Please take a few minutes to complete the written survey that you may receive in the mail after your visit with us. Thank you!             Your Updated Medication List - Protect others around you: Learn how to safely use, store and throw away your medicines at www.disposemymeds.org.          This list is accurate as of 2/6/18 10:56 AM.  Always use your most recent med list.                   Brand Name Dispense Instructions for use Diagnosis    calcium carbonate 1250 MG tablet    OS-MINERVA 500 mg [place]. Ca     Take 1 tablet by mouth 2 times daily        CO Q-10 PO      Take 1 capsule by mouth daily        COLLAGEN PO           GLUTATHIONE PO           magnesium 100 MG Caps      Take by mouth daily        VITAMIN C CR PO      Take 1 tablet by mouth daily.        VITAMIN E COMPLEX PO           VITEYES AREDS FORMULA/LUTEIN Caps      Take 1 tablet by mouth daily

## 2018-02-08 ENCOUNTER — OFFICE VISIT (OUTPATIENT)
Dept: NEUROLOGY | Facility: CLINIC | Age: 58
End: 2018-02-08
Payer: COMMERCIAL

## 2018-02-08 VITALS
SYSTOLIC BLOOD PRESSURE: 96 MMHG | HEART RATE: 71 BPM | RESPIRATION RATE: 18 BRPM | OXYGEN SATURATION: 95 % | HEIGHT: 64 IN | DIASTOLIC BLOOD PRESSURE: 69 MMHG | BODY MASS INDEX: 23.73 KG/M2 | WEIGHT: 139 LBS | TEMPERATURE: 97.9 F

## 2018-02-08 DIAGNOSIS — R42 DIZZINESS: ICD-10-CM

## 2018-02-08 DIAGNOSIS — R25.1 TREMOR: Primary | ICD-10-CM

## 2018-02-08 PROCEDURE — 99244 OFF/OP CNSLTJ NEW/EST MOD 40: CPT | Performed by: PSYCHIATRY & NEUROLOGY

## 2018-02-08 NOTE — NURSING NOTE
COGNITIVE SCREEN  1) Repeat 3 items (Banana, Sunrise, Chair)    2) Clock draw: NORMAL  3) 3 item recall: Recalls 3 objects  Results: 3 items recalled: COGNITIVE IMPAIRMENT LESS LIKELY    Mini-CogTM Copyright S Dalia. Licensed by the author for use in [facility]; reprinted with permission ([email or web address]). All rights reserved.        Jennifer Dalton [facility]

## 2018-02-08 NOTE — PATIENT INSTRUCTIONS
AFTER VISIT SUMMARY (AVS):    At today's visit we discussed various diagnostic possibilities for your symptoms.  At the current time, no additional neurological investigations are needed.  Please come back for additional evaluation if your symptoms worsen.    No new medications were ordered.    Next follow-up appointment is on as needed basis.    Please do not hesitate to call me with any questions or concerns.    Thanks.

## 2018-02-08 NOTE — PROGRESS NOTES
"INITIAL NEUROLOGY CONSULTATION    DATE OF VISIT: 2/8/2018  CLINIC LOCATION: [facility]  MRN: [id number]  PATIENT NAME: Katie Hernandez  YOB: 1960    PRIMARY CARE PROVIDER: Mahesh Santoyo MD     REASON FOR VISIT:   Chief Complaint   Patient presents with     Consult     paresthesia (patient states tremors inside her body)     HISTORY OF PRESENT ILLNESS:                                                    Ms. Katei Hernandez is 57 year old right handed female patient with history of leiomyoma status post hysterectomy, asthma, colitis, and anemia, who was seen in consultation today requested by Mahesh Santoyo MD, for tremor (internal).    Per patient's report, she was in her usual state of health until approximately 6 months ago, when she developed internal tremors without visible shaking of her head or extremities.  It feels to the patient as if her \"whole body is vibrating inside\".  Initially, it occurred approximately once per week, but now it occurs several times per day.  She reports that her stress level is significantly elevated over the last 3 weeks (family and work).  Denies anosmia, micrographia, acting out in sleep, bradykinesia, postural instability, and  any other additional focal neurological symptoms.  Over the preceding 3 months before the onset of her symptoms, the patient reports increased level of stress.  She also ate garlic from [place] with possibly elevated levels of methyl bromide (pesticide) that could cause effects on nervous system.  Her  ate the same garlic without any similar symptoms, though the patient mentions that she is extremely sensitive to chemicals or medications.  She could not identify any other possible causes.  No family history of Parkinson's disease.  Reports that her father had mild bilateral hand tremor later in life.       In addition, the patient reports intermittent dizziness with associated ringing in both ears for the past few " "days along with associated generalized weakness, as if \"heart was failing\" or the patient is almost \"fainting\"/needs more air.  These episodes happened in context of irregular meals or fasting (that she did twice per week) and improved with better food and fluid intake.  Today, she reports continued dizziness since this morning.  She did not eat her breakfast until noon.  She had 3-4 glasses of water until now.    In May 2017, the patient completed stress echocardiogram, which was normal.    The recent laboratory evaluation (September 2017) includes normal TSH, CBC, and BMP, except marginally elevated glucose of 114.  Her TTG and hepatitis C serology were negative in June 2017.    No prior brain imaging.  No additional pertinent information is available in Care Everywhere, which was reviewed.    The patient denies a history of recent head injury. Prior neurological history: negative for migraine, stroke, brain neoplasms, seizure disorders, multiple sclerosis, meningitis, encephalitis, and major head injuries.  Reports one time seizure approximately 10 years ago during uterine biopsy.  It never recurred since that time.    Neurologic Review of Systems - no amaurosis, diplopia, abnormal speech, unilateral numbness or weakness. She endorses insomnia, fatigue, irregular heartbeats, swollen feet, asthma, nausea, constipation, irritable bowel syndrome, and arthritis.  These problems have been already discussed with other medical providers.  Otherwise, she denies any other complaints on 14-point comprehensive review of systems.    PAST MEDICAL/SURGICAL HISTORY:                                                    I personally reviewed patient's past medical and surgical history with the patient at today's visit.  Past Medical History:   Diagnosis Date     Abdominal pain 03/2017    ct with long segment of colits descending colon, then colonoscopy and bx by Dr. Dailey and nl     Abnormal stools 5/30/2013     Anemia     \"Related " "to heavy periods\"     Breast disorder ,     Pain, lump     Complex endometrial hyperplasia with atypia 10/17/2013    Path dx w complete hysterectomy.      H/O colonoscopy 2017    nl, bx nl     Kidney cysts     left seen on CT     Macular degeneration     Taking vitamin.     Menarche     cycles q 28 x 5 d     Menorrhagia 2007    Oct 2009 ->anemia to 7+ gms.  Resolved with OTC po FE 50 mg/d      Postpartum depression     Only for a few days     Precancerous skin lesion     excised -- abdomen. ->yrly Derm cks     S/P total hysterectomy and bilateral salpingo-oophorectomy 10/17/2013    Path: -->Complex endometrial hyperplasia with atypia.      SOB (shortness of breath) 2017    also elevated d dimer - ct no pe, some fibrosis or atelect, breast cysts, then est echo nl      Swelling of the ankle, feet, or leg     Ankles     Uncomplicated asthma     Childhood. Currently have sensitive airways.     Unspecified hemorrhoids without mention of complication     bleeding to anemia -- hgb 8.4 and lower....     Uterine leiomyoma 2009    [place] diagnosis while in [place] 8/10/2011 US in this EMR confirms multiple myomas with a 12 mm endometrial stripe.  (Problem list name updated by automated process. Provider to review and confirm*     Past Surgical History:   Procedure Laterality Date     APPENDECTOMY OPEN  age 3     BIOPSY      uterine, skin     C LAP,SURG,COLECTOMY, PARTIAL, W/ANAST  age 3    tumor in colon as child (removed tumor and appendix)      SECTION   and      COLONOSCOPY      virtual     COLONOSCOPY  2013    Procedure: COLONOSCOPY;  COLONOSCOPY;  Surgeon: Db Reed MD;  Location:  GI     COLONOSCOPY N/A 3/14/2017    Procedure: COMBINED COLONOSCOPY, SINGLE OR MULTIPLE BIOPSY/POLYPECTOMY BY BIOPSY;  Surgeon: Db Reed MD;  Location: [facility]     DAVINCI HYSTERECTOMY TOTAL, BILATERAL SALPINGO-OOPHORECTOMY, COMBINED  10/17/2013    " Procedure: COMBINED DAVINCI HYSTERECTOMY TOTAL, SALPINGO-OOPHORECTOMY;  DaVinci Assisted Total Laparoscopic Hysterectomy/Bilateral Salpingo Oophorectomy Cystoscopy  ;  Surgeon: Zoya Vance MD;  Location: [facility] OR     DILATION AND CURETTAGE, OPERATIVE HYSTEROSCOPY WITH MORCELLATOR, COMBINED  2013    Procedure: COMBINED DILATION AND CURETTAGE, OPERATIVE HYSTEROSCOPY WITH MORCELLATOR;  Dilation and Curettage, Hysteroscopy, Hysteroscopic  Polypectomy with Myosure ;  Surgeon: Concepción Gamboa MD;  Location: [facility] OR     LAPAROSCOPY DIAGNOSTIC (GYN)      ectopic excision     MEDICATIONS:                                                    I personally reviewed patient's medications and allergies with the patient at today's visit.  Current Outpatient Prescriptions on File Prior to Visit:  GLUTATHIONE PO    COLLAGEN PO    VITAMIN E COMPLEX PO    calcium carbonate (OS-MINERVA 500 MG [place]. [place]) 1250 MG tablet Take 1 tablet by mouth 2 times daily   Multiple Vitamins-Minerals CAPS Take 1 tablet by mouth daily   magnesium 100 MG CAPS Take by mouth daily   Coenzyme Q10 (CO Q-10 PO) Take 1 capsule by mouth daily   Ascorbic Acid (VITAMIN C CR PO) Take 1 tablet by mouth daily.     ALLERGIES:                                                      Allergies   Allergen Reactions     Hydrocodone Other (See Comments)     Pt fainted     Penicillins Hives     Doxycycline Other (See Comments)     Feeling of bugs crawling on skin and tinnitus     Hibiclens      Itching after preop washing.     FAMILY/SOCIAL HISTORY:                                                    Family and social history was reviewed with the patient at today's visit.  Family history is positive for stroke, dementia, and Alzheimer's disease.   Problem (# of Occurrences) Relation (Name,Age of Onset)    Anxiety Disorder (1) Mother (Brooklyn Hernandez)    Asthma (1) Brother (Curtis Hernandez)    Breast Cancer (2) Paternal Grandmother (Brooklyn Alcantar, 45):  age 55,  "Other (Maternal aunts)    CANCER (2) Maternal Grandmother (Simran Cid, 60): uterine/breast, Maternal Aunt (60): ovarian    CEREBROVASCULAR DISEASE (1) Father (Michael Hernandez)    Cancer - colorectal (1) Maternal Aunt: possible    DIABETES (1) Father (Michael Hernandez, 75): DMII    Eye Disorder (1) Mother (Brooklyn Hernandez): macular degeneration    Hypertension (2) Father (Michael Hernandez, 65), Mother (Brooklyn Hernandez, 45)    MENTAL ILLNESS (2) Mother (Brooklyn Hernandez), Other (Maternal aunts)    Other Cancer (1) Father (Michael Hernandez): Carcinoma    Psychotic Disorder (1) Mother (Brooklyn Hernandez): Borderline Personality Disorder ([id number])    Uterine Cancer (2) Maternal Grandmother (Simran Cid), Other (Maternal aunts)        , lives with her family.  Never smoker.  Denies current alcohol and recreational drug use.  Works full-time as  for 20+ years.  Social History   Substance Use Topics     Smoking status: Never Smoker     Smokeless tobacco: Never Used     Alcohol use Yes      Comment: 1/2 d / mo     REVIEW OF SYSTEMS:                                                    Patient has completed a [facility] Patient Health History, including a 14-system review, which was personally reviewed, and pertinent positives are listed in HPI. She denies any additional problems on the further questioning.    EXAM:                                                    VITAL SIGNS:   /62 (BP Location: Right arm, Patient Position: Sitting, Cuff Size: Adult Regular)  Pulse 85  Temp 97.9  F (36.6  C) (Oral)  Resp 18  Ht 1.626 m (5' 4\")  Wt 63 kg (139 lb)  LMP 08/20/2013  SpO2 99%  BMI 23.86 kg/m2  Orthostatic blood pressure:  Vitals:    02/08/18 1557 02/08/18 1653 02/08/18 1654   BP: 100/62 102/67 96/69   BP Location: Right arm Right arm Right arm   Patient Position: Sitting Supine Standing   Cuff Size: Adult Regular Adult Regular Adult Regular   Pulse: 85 68 71   Resp: 18     Temp: 97.9 " " F (36.6  C)     TempSrc: Oral     SpO2: 99% 95% 95%   Weight: 63 kg (139 lb)     Height: 1.626 m (5' 4\")       General: pt is in NAD, cooperative.  Skin: normal turgor, moist mucous membranes, no lesions/rashes noticed.  HEENT: ATNC, EOMI, PERRL, white sclera, normal conjunctiva, no nystagmus or ptosis. No carotid bruits bilaterally.  Respiratory: lung sounds clear to auscultation bilaterally, no crackles, wheezes, rhonchi. Symmetric lung excursion, no accessory respiratory muscle use.  Cardiovascular: normal S1/S2, no murmurs/rubs/gallops.   Abdomen: Not distended.  : deferred.    Neurological:  Mental: alert, follows commands, mini-cog is 5/5 with 3/3 on memory recall, no aphasia or dysarthria. Fund of knowledge is appropriate for age.  Cranial Nerves:  CN II: visual acuity - able to accurately count fingers with each eye. Visual fields intact, fundi: discs sharp, no papilledema and normal vessels bilaterally.  CN III, IV, VI: EOM intact, pupils equal and reactive.  No nystagmus, no skew deviation.  CN V: facial sensation nl  CN VII: face symmetric, no facial droop  CN VIII: hearing normal.  Head impulse tests and Sunil-Hallpike maneuvers are negative bilaterally.  CN IX: palate elevation symmetric, uvula at midline  CN XI SCM normal, shoulder shrug nl  CN XII: tongue midline  Motor: Strength: 5/5 in all major groups of all extremities. Normal tone. No tremor, no other abnormal movements. No pronator drift b/l.  Reflexes: Triceps, biceps, brachioradialis, patellar, and achilles reflexes normal and symmetric. No clonus noted. Toes are down-going b/l.   Sensory: temperature, light touch, pinprick, and vibration intact. Romberg: negative.  Coordination: FNF and heel-shin tests intact b/l. No dysdiadochokinesia with rapid alternating movements.  Gait:  Normal, able to tandem, toe, and heel walk.    DATA:     LABS: I personally reviewed the following labs:  Office Visit on 09/12/2017   Component Date Value Ref Range " Status     WBC 09/12/2017 6.5  4.0 - 11.0 10e9/L Final     RBC Count 09/12/2017 4.33  3.8 - 5.2 10e12/L Final     Hemoglobin 09/12/2017 13.4  11.7 - 15.7 g/dL Final     Hematocrit 09/12/2017 39.1  35.0 - 47.0 % Final     MCV 09/12/2017 90  78 - 100 fl Final     MCH 09/12/2017 30.9  26.5 - 33.0 pg Final     MCHC 09/12/2017 34.3  31.5 - 36.5 g/dL Final     RDW 09/12/2017 13.7  10.0 - 15.0 % Final     Platelet Count 09/12/2017 237  150 - 450 10e9/L Final     Sodium 09/12/2017 141  133 - 144 mmol/L Final     Potassium 09/12/2017 3.9  3.4 - 5.3 mmol/L Final     Chloride 09/12/2017 107  94 - 109 mmol/L Final     Carbon Dioxide 09/12/2017 22  20 - 32 mmol/L Final     Anion Gap 09/12/2017 12  3 - 14 mmol/L Final     Glucose 09/12/2017 114* 70 - 99 mg/dL Final     Urea Nitrogen 09/12/2017 11  7 - 30 mg/dL Final     Creatinine 09/12/2017 0.73  0.52 - 1.04 mg/dL Final     GFR Estimate 09/12/2017 82  >60 mL/min/1.7m2 Final    Non  GFR Calc     GFR Estimate If Black 09/12/2017 >90  >60 mL/min/1.7m2 Final    African American GFR Calc     Calcium 09/12/2017 9.0  8.5 - 10.1 mg/dL Final     TSH 09/12/2017 1.25  0.40 - 4.00 mU/L Final     IMAGING/OTHER STUDIES: I reviewed pertinent medical records, including Care Everywhere, as detailed in the history of present illness.    ASSESSMENT and PLAN:      ASSESSMENT: Katie Hernandez is a 57 year old female patient with history of leiomyoma status post hysterectomy, asthma, colitis, and anemia, who presents with sensation of internal tremor for the last 6 months in context of elevated stress without other focal neurological findings.  The patient also reports dizziness in context of fasting/irregular food intake.    We had a prolonged discussion with the patient regarding her symptoms of internal tremor.  Her neurological exam today is non-focal, including vestibular testing.  Her recent thyroid testing was normal.  She did not have previous brain imaging, and at the  current time I do not feel that she needs any additional neurological evaluation.    I also reviewed possible toxic effects of methyl bromide on the nervous system, which include confusion, myoclonus, seizures, encephalopathy, mood and personality changes.  I do not think that her current symptoms are caused by effects of methyl bromide.  Her clinical presentation could not be explained by the pathology of nervous system.  It is possible that her symptoms might be related to elevated anxiety/stress level.  I advised the patient to continue her evaluation with her primary care provider.    Orthostatic testing was negative today.  Her dizziness and near fainting episodes might be related to dehydration and poor nutrition.  Alternatively, they might point toward cardiac pathology, though previous testing was negative/unrevealing.  Currently, I do not see any neurological explanation of her dizziness.  I advised the patient to discuss these symptoms with her primary care provider for additional possible cardiac evaluation.  I also advised the patient to come back for additional testing if her symptoms of dizziness worsen or she develops additional focal neurological symptoms.    DIAGNOSES:    ICD-10-CM    1. Tremor R25.1    2. Dizziness R42      PLAN: At today's visit we thoroughly discussed various diagnostic possibilities for patient's symptoms.  At the current time, no additional neurological investigations are needed.    No new medications were ordered.    Next follow-up appointment is on as needed basis.    I encouraged the patient to call me with any questions or concerns.    Total Time: 61 minutes with > 50% spent counseling the patient on stated above assessment and recommendations, including nature of the diagnosis, needed w/u, proposed plan of treatment, and prognosis.  Additional time was used to thoroughly discussed her symptoms and answer numerous patient's questions.    Aurelio Bearden MD  /HW  Neurology  [facility]  (Chart documentation was completed in part with Dragon voice-recognition software. Even though reviewed, some grammatical, spelling, and word errors may remain.)

## 2018-02-08 NOTE — MR AVS SNAPSHOT
After Visit Summary   2/8/2018    Katie Hernandez    MRN: [id number]           Patient Information     Date Of Birth          1960        Visit Information        Provider Department      2/8/2018 4:00 PM Aurelio Bearden MD [facility]        Today's Diagnoses     Tremor    -  1    Dizziness          Care Instructions    AFTER VISIT SUMMARY (AVS):    At today's visit we discussed various diagnostic possibilities for your symptoms.  At the current time, no additional neurological investigations are needed.  Please come back for additional evaluation if your symptoms worsen.    No new medications were ordered.    Next follow-up appointment is on as needed basis.    Please do not hesitate to call me with any questions or concerns.    Thanks.            Follow-ups after your visit        Who to contact     If you have questions or need follow up information about today's clinic visit or your schedule please contact [facility] directly at [phone].  Normal or non-critical lab and imaging results will be communicated to you by MyChart, letter or phone within 4 business days after the clinic has received the results. If you do not hear from us within 7 days, please contact the clinic through [employer] or phone. If you have a critical or abnormal lab result, we will notify you by phone as soon as possible.  Submit refill requests through [employer] or call your pharmacy and they will forward the refill request to us. Please allow 3 business days for your refill to be completed.          Additional Information About Your Visit        MyChart Information     [employer] gives you secure access to your electronic health record. If you see a primary care provider, you can also send messages to your care team and make appointments. If you have questions, please call your primary care clinic.  If you do not have a primary care provider, please call  "[phone] and they will assist you.        Care EveryWhere ID     This is your Care EveryWhere ID. This could be used by other organizations to access your [facility] medical records  [id number]        Your Vitals Were     Pulse Temperature Respirations Height Last Period Pulse Oximetry    71 97.9  F (36.6  C) (Oral) 18 1.626 m (5' 4\") 08/20/2013 95%    BMI (Body Mass Index)                   23.86 kg/m2            Blood Pressure from Last 3 Encounters:   02/08/18 96/69   02/06/18 103/69   12/14/17 101/68    Weight from Last 3 Encounters:   02/08/18 63 kg (139 lb)   02/06/18 63 kg (139 lb)   12/14/17 63.5 kg (140 lb)              Today, you had the following     No orders found for display       Primary Care Provider Office Phone # Fax #    Mahesh Santoyo [phone] [address] [facility] [address]  [facility] [address]        Equal Access to Services     [facility]: Hadii aad ku hadasho Soomaali, waaxda luqadaha, qaybta kaalmada adeegyada, leticia meek hayyecenia estrada . So [facility] [phone].    ATENCIÓN: Si habla español, tiene a [place] disposición servicios gratuitos de asistencia lingüística. Llame al [phone].    We comply with applicable federal civil rights laws and [place] laws. We do not discriminate on the basis of race, color, national origin, age, disability, sex, sexual orientation, or gender identity.            Thank you!     Thank you for choosing [facility]  for your care. Our goal is always to provide you with excellent care. Hearing back from our patients is one way we can continue to improve our services. Please take a few minutes to complete the written survey that you may receive in the mail after your visit with us. Thank you!             Your Updated Medication List - Protect others around you: Learn how to safely use, store and throw away your medicines at www.disposemymeds.org.          This list is accurate as of 2/8/18  4:56 PM.  " Always use your most recent med list.                   Brand Name Dispense Instructions for use Diagnosis    calcium carbonate 1250 MG tablet    OS-MINERVA 500 mg [place]. Ca     Take 1 tablet by mouth 2 times daily        CO Q-10 PO      Take 1 capsule by mouth daily        COLLAGEN PO           GLUTATHIONE PO           magnesium 100 MG Caps      Take by mouth daily        VITAMIN A PO      Take 10,000 Units by mouth daily        vitamin B complex with vitamin C Tabs tablet      Take 1 tablet by mouth daily        VITAMIN C CR PO      Take 1 tablet by mouth daily.        VITAMIN E COMPLEX PO           VITEYES AREDS FORMULA/LUTEIN Caps      Take 1 tablet by mouth daily

## 2018-02-08 NOTE — LETTER
"    2/8/2018         RE: Katie Hernandez  [address]  [facility] [place] [address]        Dear Colleague,    Thank you for referring your patient, Katie Hernandez, to the [facility]. Please see a copy of my visit note below.    INITIAL NEUROLOGY CONSULTATION    DATE OF VISIT: 2/8/2018  CLINIC LOCATION: [facility]  MRN: [id number]  PATIENT NAME: Katie Hernandez  YOB: 1960    PRIMARY CARE PROVIDER: Mahesh Santoyo MD     REASON FOR VISIT:   Chief Complaint   Patient presents with     Consult     paresthesia (patient states tremors inside her body)     HISTORY OF PRESENT ILLNESS:                                                    Ms. Katie Hernandez is 57 year old right handed female patient with history of leiomyoma status post hysterectomy, asthma, colitis, and anemia, who was seen in consultation today requested by Mahesh Santoyo MD, for tremor (internal).    Per patient's report, she was in her usual state of health until approximately 6 months ago, when she developed internal tremors without visible shaking of her head or extremities.  It feels to the patient as if her \"whole body is vibrating inside\".  Initially, it occurred approximately once per week, but now it occurs several times per day.  She reports that her stress level is significantly elevated over the last 3 weeks (family and work).  Denies anosmia, micrographia, acting out in sleep, bradykinesia, postural instability, and  any other additional focal neurological symptoms.  Over the preceding 3 months before the onset of her symptoms, the patient reports increased level of stress.  She also ate garlic from [place] with possibly elevated levels of methyl bromide (pesticide) that could cause effects on nervous system.  Her  ate the same garlic without any similar symptoms, though the patient mentions that she is extremely sensitive to chemicals or medications.  She could not identify " "any other possible causes.  No family history of Parkinson's disease.  Reports that her father had mild bilateral hand tremor later in life.       In addition, the patient reports intermittent dizziness with associated ringing in both ears for the past few days along with associated generalized weakness, as if \"heart was failing\" or the patient is almost \"fainting\"/needs more air.  These episodes happened in context of irregular meals or fasting (that she did twice per week) and improved with better food and fluid intake.  Today, she reports continued dizziness since this morning.  She did not eat her breakfast until noon.  She had 3-4 glasses of water until now.    In May 2017, the patient completed stress echocardiogram, which was normal.    The recent laboratory evaluation (September 2017) includes normal TSH, CBC, and BMP, except marginally elevated glucose of 114.  Her TTG and hepatitis C serology were negative in June 2017.    No prior brain imaging.  No additional pertinent information is available in Care Everywhere, which was reviewed.    The patient denies a history of recent head injury. Prior neurological history: negative for migraine, stroke, brain neoplasms, seizure disorders, multiple sclerosis, meningitis, encephalitis, and major head injuries.  Reports one time seizure approximately 10 years ago during uterine biopsy.  It never recurred since that time.    Neurologic Review of Systems - no amaurosis, diplopia, abnormal speech, unilateral numbness or weakness. She endorses insomnia, fatigue, irregular heartbeats, swollen feet, asthma, nausea, constipation, irritable bowel syndrome, and arthritis.  These problems have been already discussed with other medical providers.  Otherwise, she denies any other complaints on 14-point comprehensive review of systems.    PAST MEDICAL/SURGICAL HISTORY:                                                    I personally reviewed patient's past medical and surgical " "history with the patient at today's visit.  Past Medical History:   Diagnosis Date     Abdominal pain 2017    ct with long segment of colits descending colon, then colonoscopy and bx by Dr. Dailey and nl     Abnormal stools 2013     Anemia     \"Related to heavy periods\"     Breast disorder ,     Pain, lump     Complex endometrial hyperplasia with atypia 10/17/2013    Path dx w complete hysterectomy.      H/O colonoscopy 2017    nl, bx nl     Kidney cysts     left seen on CT     Macular degeneration     Taking vitamin.     Menarche     cycles q 28 x 5 d     Menorrhagia 2007    Oct 2009 ->anemia to 7+ gms.  Resolved with OTC po FE 50 mg/d      Postpartum depression     Only for a few days     Precancerous skin lesion     excised -- abdomen. ->yrly Derm cks     S/P total hysterectomy and bilateral salpingo-oophorectomy 10/17/2013    Path: -->Complex endometrial hyperplasia with atypia.      SOB (shortness of breath) 2017    also elevated d dimer - ct no pe, some fibrosis or atelect, breast cysts, then est echo nl      Swelling of the ankle, feet, or leg     Ankles     Uncomplicated asthma     Childhood. Currently have sensitive airways.     Unspecified hemorrhoids without mention of complication     bleeding to anemia -- hgb 8.4 and lower....     Uterine leiomyoma 2009    [place] diagnosis while in [place] 8/10/2011 US in this EMR confirms multiple myomas with a 12 mm endometrial stripe.  (Problem list name updated by automated process. Provider to review and confirm*     Past Surgical History:   Procedure Laterality Date     APPENDECTOMY OPEN  age 3     BIOPSY      uterine, skin     C LAP,SURG,COLECTOMY, PARTIAL, W/ANAST  age 3    tumor in colon as child (removed tumor and appendix)      SECTION   and      COLONOSCOPY      virtual     COLONOSCOPY  2013    Procedure: COLONOSCOPY;  COLONOSCOPY;  Surgeon: Db Reed MD;  Location: [facility] "     COLONOSCOPY N/A 3/14/2017    Procedure: COMBINED COLONOSCOPY, SINGLE OR MULTIPLE BIOPSY/POLYPECTOMY BY BIOPSY;  Surgeon: Db Reed MD;  Location:  GI     DAVINCI HYSTERECTOMY TOTAL, BILATERAL SALPINGO-OOPHORECTOMY, COMBINED  10/17/2013    Procedure: COMBINED DAVINCI HYSTERECTOMY TOTAL, SALPINGO-OOPHORECTOMY;  DaVinci Assisted Total Laparoscopic Hysterectomy/Bilateral Salpingo Oophorectomy Cystoscopy  ;  Surgeon: Zoya Vance MD;  Location: [facility] OR     DILATION AND CURETTAGE, OPERATIVE HYSTEROSCOPY WITH MORCELLATOR, COMBINED  9/20/2013    Procedure: COMBINED DILATION AND CURETTAGE, OPERATIVE HYSTEROSCOPY WITH MORCELLATOR;  Dilation and Curettage, Hysteroscopy, Hysteroscopic  Polypectomy with Myosure ;  Surgeon: Concepción Gamboa MD;  Location: [facility] OR     LAPAROSCOPY DIAGNOSTIC (GYN)  1990    ectopic excision     MEDICATIONS:                                                    I personally reviewed patient's medications and allergies with the patient at today's visit.  Current Outpatient Prescriptions on File Prior to Visit:  GLUTATHIONE PO    COLLAGEN PO    VITAMIN E COMPLEX PO    calcium carbonate (OS-MINERVA 500 MG [place]. CA) 1250 MG tablet Take 1 tablet by mouth 2 times daily   Multiple Vitamins-Minerals CAPS Take 1 tablet by mouth daily   magnesium 100 MG CAPS Take by mouth daily   Coenzyme Q10 (CO Q-10 PO) Take 1 capsule by mouth daily   Ascorbic Acid (VITAMIN C CR PO) Take 1 tablet by mouth daily.     ALLERGIES:                                                      Allergies   Allergen Reactions     Hydrocodone Other (See Comments)     Pt fainted     Penicillins Hives     Doxycycline Other (See Comments)     Feeling of bugs crawling on skin and tinnitus     Hibiclens      Itching after preop washing.     FAMILY/SOCIAL HISTORY:                                                    Family and social history was reviewed with the patient at today's visit.  Family history is positive for stroke,  "dementia, and Alzheimer's disease.   Problem (# of Occurrences) Relation (Name,Age of Onset)    Anxiety Disorder (1) Mother (Brooklyn Hernandez)    Asthma (1) Brother (Curtis Hernandez)    Breast Cancer (2) Paternal Grandmother (Brooklyn Alcantar, 45):  age 55, Other (Maternal aunts)    CANCER (2) Maternal Grandmother (Simran Cid, 60): uterine/breast, Maternal Aunt (60): ovarian    CEREBROVASCULAR DISEASE (1) Father (Michael Hernandez)    Cancer - colorectal (1) Maternal Aunt: possible    DIABETES (1) Father (Michael Hernandez, 75): DMII    Eye Disorder (1) Mother (Brooklyn Hernandez): macular degeneration    Hypertension (2) Father (Michael Hernandez, 65), Mother (Brooklyn Hernandez, 45)    MENTAL ILLNESS (2) Mother (Brooklyn Hernandez), Other (Maternal aunts)    Other Cancer (1) Father (Michael Hernandez): Carcinoma    Psychotic Disorder (1) Mother (Brooklyn Hernandez): Borderline Personality Disorder ([id number])    Uterine Cancer (2) Maternal Grandmother (Simran Cid), Other (Maternal aunts)        , lives with her family.  Never smoker.  Denies current alcohol and recreational drug use.  Works full-time as  for 20+ years.  Social History   Substance Use Topics     Smoking status: Never Smoker     Smokeless tobacco: Never Used     Alcohol use Yes      Comment: 1/2 d / mo     REVIEW OF SYSTEMS:                                                    Patient has completed a [facility] Patient Health History, including a 14-system review, which was personally reviewed, and pertinent positives are listed in HPI. She denies any additional problems on the further questioning.    EXAM:                                                    VITAL SIGNS:   /62 (BP Location: Right arm, Patient Position: Sitting, Cuff Size: Adult Regular)  Pulse 85  Temp 97.9  F (36.6  C) (Oral)  Resp 18  Ht 1.626 m (5' 4\")  Wt 63 kg (139 lb)  LMP 2013  SpO2 99%  BMI 23.86 kg/m2  Orthostatic blood pressure:  Vitals: " "   02/08/18 1557 02/08/18 1653 02/08/18 1654   BP: 100/62 102/67 96/69   BP Location: Right arm Right arm Right arm   Patient Position: Sitting Supine Standing   Cuff Size: Adult Regular Adult Regular Adult Regular   Pulse: 85 68 71   Resp: 18     Temp: 97.9  F (36.6  C)     TempSrc: Oral     SpO2: 99% 95% 95%   Weight: 63 kg (139 lb)     Height: 1.626 m (5' 4\")       General: pt is in NAD, cooperative.  Skin: normal turgor, moist mucous membranes, no lesions/rashes noticed.  HEENT: ATNC, EOMI, PERRL, white sclera, normal conjunctiva, no nystagmus or ptosis. No carotid bruits bilaterally.  Respiratory: lung sounds clear to auscultation bilaterally, no crackles, wheezes, rhonchi. Symmetric lung excursion, no accessory respiratory muscle use.  Cardiovascular: normal S1/S2, no murmurs/rubs/gallops.   Abdomen: Not distended.  : deferred.    Neurological:  Mental: alert, follows commands, mini-cog is 5/5 with 3/3 on memory recall, no aphasia or dysarthria. Fund of knowledge is appropriate for age.  Cranial Nerves:  CN II: visual acuity - able to accurately count fingers with each eye. Visual fields intact, fundi: discs sharp, no papilledema and normal vessels bilaterally.  CN III, IV, VI: EOM intact, pupils equal and reactive.  No nystagmus, no skew deviation.  CN V: facial sensation nl  CN VII: face symmetric, no facial droop  CN VIII: hearing normal.  Head impulse tests and Sunil-Hallpike maneuvers are negative bilaterally.  CN IX: palate elevation symmetric, uvula at midline  CN XI SCM normal, shoulder shrug nl  CN XII: tongue midline  Motor: Strength: 5/5 in all major groups of all extremities. Normal tone. No tremor, no other abnormal movements. No pronator drift b/l.  Reflexes: Triceps, biceps, brachioradialis, patellar, and achilles reflexes normal and symmetric. No clonus noted. Toes are down-going b/l.   Sensory: temperature, light touch, pinprick, and vibration intact. Romberg: negative.  Coordination: FNF and " heel-shin tests intact b/l. No dysdiadochokinesia with rapid alternating movements.  Gait:  Normal, able to tandem, toe, and heel walk.    DATA:     LABS: I personally reviewed the following labs:  Office Visit on 09/12/2017   Component Date Value Ref Range Status     WBC 09/12/2017 6.5  4.0 - 11.0 10e9/L Final     RBC Count 09/12/2017 4.33  3.8 - 5.2 10e12/L Final     Hemoglobin 09/12/2017 13.4  11.7 - 15.7 g/dL Final     Hematocrit 09/12/2017 39.1  35.0 - 47.0 % Final     MCV 09/12/2017 90  78 - 100 fl Final     MCH 09/12/2017 30.9  26.5 - 33.0 pg Final     MCHC 09/12/2017 34.3  31.5 - 36.5 g/dL Final     RDW 09/12/2017 13.7  10.0 - 15.0 % Final     Platelet Count 09/12/2017 237  150 - 450 10e9/L Final     Sodium 09/12/2017 141  133 - 144 mmol/L Final     Potassium 09/12/2017 3.9  3.4 - 5.3 mmol/L Final     Chloride 09/12/2017 107  94 - 109 mmol/L Final     Carbon Dioxide 09/12/2017 22  20 - 32 mmol/L Final     Anion Gap 09/12/2017 12  3 - 14 mmol/L Final     Glucose 09/12/2017 114* 70 - 99 mg/dL Final     Urea Nitrogen 09/12/2017 11  7 - 30 mg/dL Final     Creatinine 09/12/2017 0.73  0.52 - 1.04 mg/dL Final     GFR Estimate 09/12/2017 82  >60 mL/min/1.7m2 Final    Non  GFR Calc     GFR Estimate If Black 09/12/2017 >90  >60 mL/min/1.7m2 Final    African American GFR Calc     Calcium 09/12/2017 9.0  8.5 - 10.1 mg/dL Final     TSH 09/12/2017 1.25  0.40 - 4.00 mU/L Final     IMAGING/OTHER STUDIES: I reviewed pertinent medical records, including Care Everywhere, as detailed in the history of present illness.    ASSESSMENT and PLAN:      ASSESSMENT: Katie Hernandez is a 57 year old female patient with history of leiomyoma status post hysterectomy, asthma, colitis, and anemia, who presents with sensation of internal tremor for the last 6 months in context of elevated stress without other focal neurological findings.  The patient also reports dizziness in context of fasting/irregular food intake.    We  had a prolonged discussion with the patient regarding her symptoms of internal tremor.  Her neurological exam today is non-focal, including vestibular testing.  Her recent thyroid testing was normal.  She did not have previous brain imaging, and at the current time I do not feel that she needs any additional neurological evaluation.    I also reviewed possible toxic effects of methyl bromide on the nervous system, which include confusion, myoclonus, seizures, encephalopathy, mood and personality changes.  I do not think that her current symptoms are caused by effects of methyl bromide.  Her clinical presentation could not be explained by the pathology of nervous system.  It is possible that her symptoms might be related to elevated anxiety/stress level.  I advised the patient to continue her evaluation with her primary care provider.    Orthostatic testing was negative today.  Her dizziness and near fainting episodes might be related to dehydration and poor nutrition.  Alternatively, they might point toward cardiac pathology, though previous testing was negative/unrevealing.  Currently, I do not see any neurological explanation of her dizziness.  I advised the patient to discuss these symptoms with her primary care provider for additional possible cardiac evaluation.  I also advised the patient to come back for additional testing if her symptoms of dizziness worsen or she develops additional focal neurological symptoms.    DIAGNOSES:    ICD-10-CM    1. Tremor R25.1    2. Dizziness R42      PLAN: At today's visit we thoroughly discussed various diagnostic possibilities for patient's symptoms.  At the current time, no additional neurological investigations are needed.    No new medications were ordered.    Next follow-up appointment is on as needed basis.    I encouraged the patient to call me with any questions or concerns.    Total Time: 61 minutes with > 50% spent counseling the patient on stated above assessment  and recommendations, including nature of the diagnosis, needed w/u, proposed plan of treatment, and prognosis.  Additional time was used to thoroughly discussed her symptoms and answer numerous patient's questions.    Aurelio Bearden MD  / Neurology  [place]  (Chart documentation was completed in part with Dragon voice-recognition software. Even though reviewed, some grammatical, spelling, and word errors may remain.)              Again, thank you for allowing me to participate in the care of your patient.        Sincerely,        Aurelio Bearden MD

## 2018-05-01 ENCOUNTER — TELEPHONE (OUTPATIENT)
Dept: CARDIOLOGY | Facility: CLINIC | Age: 58
End: 2018-05-01

## 2018-05-01 NOTE — TELEPHONE ENCOUNTER
[facility] Call Center    Phone Message    May a detailed message be left on voicemail: yes    Reason for Call: Other: Pt called in today to get an appointment with Dr. Etienne. She said she was referred to her by Dr. Chelsi Hay for tremors and chest pain when she gets tired, but mostly the tremors. I told her we dont typically see for tremors but I can get her set up for the chest pain. She only wanted to see Dr. Etienne and her first available is in July. Please take a look, and reach out to the pt if you feel necessary.      Action Taken: Message routed to:  [facility] ([facility]): Cardiology

## 2018-06-05 ENCOUNTER — OFFICE VISIT (OUTPATIENT)
Dept: OBGYN | Facility: CLINIC | Age: 58
End: 2018-06-05
Attending: OBSTETRICS & GYNECOLOGY
Payer: COMMERCIAL

## 2018-06-05 VITALS
DIASTOLIC BLOOD PRESSURE: 71 MMHG | HEIGHT: 64 IN | HEART RATE: 67 BPM | BODY MASS INDEX: 23.73 KG/M2 | SYSTOLIC BLOOD PRESSURE: 105 MMHG | WEIGHT: 139 LBS

## 2018-06-05 DIAGNOSIS — N90.89 VULVAR IRRITATION: Primary | ICD-10-CM

## 2018-06-05 PROCEDURE — G0463 HOSPITAL OUTPT CLINIC VISIT: HCPCS | Mod: ZF

## 2018-06-05 NOTE — NURSING NOTE
Chief Complaint   Patient presents with     Follow Up For     possible yeast had it more than a month also wants a breast exam       Selam Mckeon

## 2018-06-05 NOTE — MR AVS SNAPSHOT
"              After Visit Summary   6/5/2018    Katie Hernandez    MRN: [id number]           Patient Information     Date Of Birth          1960        Visit Information        Provider Department      6/5/2018 10:45 AM Karie Betancourt MD [facility]        Today's Diagnoses     Vulvar irritation    -  1       Follow-ups after your visit        Your next 10 appointments already scheduled     Jul 16, 2018  2:30 PM CDT   (Arrive by 2:15 PM)   New Patient Visit with Mae Etienne MD   [facility])    [address] [place] [address]  [address] [place] [address] [phone]              Who to contact     Please call your clinic at [phone] to:    Ask questions about your health    Make or cancel appointments    Discuss your medicines    Learn about your test results    Speak to your doctor            Additional Information About Your Visit        MyChart Information     [employer] gives you secure access to your electronic health record. If you see a primary care provider, you can also send messages to your care team and make appointments. If you have questions, please call your primary care clinic.  If you do not have a primary care provider, please call [phone] and they will assist you.      [employer] is an electronic gateway that provides easy, online access to your medical records. With [employer], you can request a clinic appointment, read your test results, renew a prescription or communicate with your care team.     To access your existing account, please contact your [facility] or call [phone] for assistance.        Care EveryWhere ID     This is your Care EveryWhere ID. This could be used by other organizations to access your [facility] medical records  [id number]        Your Vitals Were     Pulse Height Last Period BMI (Body Mass Index)          67 1.626 m (5' 4\") 08/20/2013 23.86 kg/m2      "    Blood Pressure from Last 3 Encounters:   06/05/18 105/71   02/08/18 96/69   02/06/18 103/69    Weight from Last 3 Encounters:   06/05/18 63 kg (139 lb)   02/08/18 63 kg (139 lb)   02/06/18 63 kg (139 lb)              Today, you had the following     No orders found for display       Primary Care Provider Office Phone # Fax #    Mahesh Santoyo [phone] [address]  [place] [address]        Equal Access to Services     [facility]: Hadii aad ku hadasho Soreese, waaxda luqadaha, qaybta kaalmada joni, leticia estrada . So [facility] [phone].    ATENCIÓN: Si habla español, tiene a montgomery disposición servicios gratuitos de asistencia lingüística. [facility] [phone].    We comply with applicable federal civil rights laws and [place] laws. We do not discriminate on the basis of race, color, national origin, age, disability, sex, sexual orientation, or gender identity.            Thank you!     Thank you for choosing [facility]  for your care. Our goal is always to provide you with excellent care. Hearing back from our patients is one way we can continue to improve our services. Please take a few minutes to complete the written survey that you may receive in the mail after your visit with us. Thank you!             Your Updated Medication List - Protect others around you: Learn how to safely use, store and throw away your medicines at www.disposemymeds.org.          This list is accurate as of 6/5/18 11:59 PM.  Always use your most recent med list.                   Brand Name Dispense Instructions for use Diagnosis    calcium carbonate 500 MG tablet    OS-MINERVA 500 mg [place]. Ca     Take 1 tablet by mouth 2 times daily        CO Q-10 PO      Take 1 capsule by mouth daily        COLLAGEN PO           GLUTATHIONE PO           magnesium 100 MG Caps      Take by mouth daily        VITAMIN A PO      Take 10,000 Units by mouth daily         vitamin B complex with vitamin C Tabs tablet      Take 1 tablet by mouth daily        VITAMIN C CR PO      Take 1 tablet by mouth daily.        VITAMIN E COMPLEX PO           VITEYES AREDS FORMULA/LUTEIN Caps      Take 1 tablet by mouth daily

## 2018-06-05 NOTE — LETTER
"6/5/2018       RE: Katie Hernandez  [address]  [facility] [address]     Dear Colleague,    Thank you for referring your patient, Katie Hernandez, to the [facility] at [facility]. Please see a copy of my visit note below.    [facility] Visit    CC: Vulvar itching    S: 58 year old here for evaluation of possible yeast infection. Was recently travelling, had vulvar irritation and itching which has resolved over past few days. No discharge or odor. Was using different toilet paper and also had toilet flush while she was sitting, worried about possible infection. Is sexually active with minimal discomfort. Denies vaginal dryness. Previously used vaginal estrogen, no longer needs. Also interested in a breast exam, had imaging in 2017 for pain, pain resolved after stopping fish oil.     Denies dysuria, does have some bowel irregularity and h/o IBS.     O: /71 (BP Location: Right arm, Patient Position: Chair)  Pulse 67  Ht 1.626 m (5' 4\")  Wt 63 kg (139 lb)  LMP 08/20/2013  BMI 23.86 kg/m2  General: No distress  Breast: No skin changes bilaterally, no masses palpated, minimally tender along Coopers ligament on left. No axillary LAD.  Abdomen: Soft, non-tender, non-distended, no masses  Pelvic Exam:  Vulva: No external lesions, normal hair distribution, normal architecture  Vagina: Moist, pink, no abnormal discharge, well rugated, no lesions    Wet prep: Negative    A:58 year old with vulvar irritation, now resolving.     P: Encouraged ongoing vulvar hygiene  Hydrocortisone ointment to vulva as needed  Plans mammograms every 2 years      Karie Betancourt MD FACOG          "

## 2018-06-07 NOTE — PROGRESS NOTES
"[facility] Visit    CC: Vulvar itching    S: 58 year old here for evaluation of possible yeast infection. Was recently travelling, had vulvar irritation and itching which has resolved over past few days. No discharge or odor. Was using different toilet paper and also had toilet flush while she was sitting, worried about possible infection. Is sexually active with minimal discomfort. Denies vaginal dryness. Previously used vaginal estrogen, no longer needs. Also interested in a breast exam, had imaging in 2017 for pain, pain resolved after stopping fish oil.     Denies dysuria, does have some bowel irregularity and h/o IBS.     O: /71 (BP Location: Right arm, Patient Position: Chair)  Pulse 67  Ht 1.626 m (5' 4\")  Wt 63 kg (139 lb)  LMP 08/20/2013  BMI 23.86 kg/m2  General: No distress  Breast: No skin changes bilaterally, no masses palpated, minimally tender along Coopers ligament on left. No axillary LAD.  Abdomen: Soft, non-tender, non-distended, no masses  Pelvic Exam:  Vulva: No external lesions, normal hair distribution, normal architecture  Vagina: Moist, pink, no abnormal discharge, well rugated, no lesions    Wet prep: Negative    A:58 year old with vulvar irritation, now resolving.     P: Encouraged ongoing vulvar hygiene  Hydrocortisone ointment to vulva as needed  Plans mammograms every 2 years      Karie Betancourt MD FACOG  "

## 2018-07-02 ASSESSMENT — ENCOUNTER SYMPTOMS
SKIN CHANGES: 1
TROUBLE SWALLOWING: 0
DISTURBANCES IN COORDINATION: 0
BLOATING: 1
BOWEL INCONTINENCE: 0
POLYPHAGIA: 0
FEVER: 0
MEMORY LOSS: 0
PALPITATIONS: 1
JAUNDICE: 0
HEARTBURN: 0
LOSS OF CONSCIOUSNESS: 0
BLOOD IN STOOL: 0
TASTE DISTURBANCE: 0
MUSCLE WEAKNESS: 0
INCREASED ENERGY: 1
POSTURAL DYSPNEA: 0
LEG PAIN: 1
ALTERED TEMPERATURE REGULATION: 1
PANIC: 0
CHILLS: 1
ARTHRALGIAS: 1
HYPOTENSION: 1
WEIGHT GAIN: 0
BACK PAIN: 0
EYE REDNESS: 0
SINUS CONGESTION: 0
EYE PAIN: 1
SPUTUM PRODUCTION: 0
WHEEZING: 0
DIARRHEA: 1
POLYDIPSIA: 0
INSOMNIA: 1
EXERCISE INTOLERANCE: 1
DECREASED APPETITE: 0
NAUSEA: 1
HEMOPTYSIS: 0
SINUS PAIN: 1
EYE IRRITATION: 1
SLEEP DISTURBANCES DUE TO BREATHING: 0
COUGH: 1
NECK MASS: 0
SNORES LOUDLY: 0
SMELL DISTURBANCE: 0
SPEECH CHANGE: 0
TREMORS: 1
RECTAL PAIN: 1
WEIGHT LOSS: 0
MYALGIAS: 1
DYSPNEA ON EXERTION: 1
PARALYSIS: 0
HOARSE VOICE: 1
EYE WATERING: 0
DECREASED CONCENTRATION: 1
COUGH DISTURBING SLEEP: 1
DIZZINESS: 1
NUMBNESS: 0
SYNCOPE: 0
NECK PAIN: 0
CONSTIPATION: 1
TINGLING: 0
LIGHT-HEADEDNESS: 1
NAIL CHANGES: 1
DOUBLE VISION: 0
DEPRESSION: 0
MUSCLE CRAMPS: 0
ORTHOPNEA: 0
NIGHT SWEATS: 1
STIFFNESS: 1
SEIZURES: 0
POOR WOUND HEALING: 0
VOMITING: 0
FATIGUE: 1
SHORTNESS OF BREATH: 0
WEAKNESS: 1
JOINT SWELLING: 1
HALLUCINATIONS: 0
HEADACHES: 0
ABDOMINAL PAIN: 1
NERVOUS/ANXIOUS: 1
SORE THROAT: 0
HYPERTENSION: 0

## 2018-07-16 ENCOUNTER — OFFICE VISIT (OUTPATIENT)
Dept: CARDIOLOGY | Facility: CLINIC | Age: 58
End: 2018-07-16
Attending: INTERNAL MEDICINE
Payer: COMMERCIAL

## 2018-07-16 VITALS
DIASTOLIC BLOOD PRESSURE: 74 MMHG | BODY MASS INDEX: 24.04 KG/M2 | WEIGHT: 140.8 LBS | RESPIRATION RATE: 18 BRPM | HEART RATE: 71 BPM | SYSTOLIC BLOOD PRESSURE: 110 MMHG | HEIGHT: 64 IN | OXYGEN SATURATION: 97 %

## 2018-07-16 DIAGNOSIS — Z82.3 FAMILY HISTORY OF STROKE (CEREBROVASCULAR): ICD-10-CM

## 2018-07-16 DIAGNOSIS — R07.89 ATYPICAL CHEST PAIN: ICD-10-CM

## 2018-07-16 DIAGNOSIS — Z90.79 S/P TOTAL HYSTERECTOMY AND BILATERAL SALPINGO-OOPHORECTOMY: Primary | ICD-10-CM

## 2018-07-16 DIAGNOSIS — Z90.710 S/P TOTAL HYSTERECTOMY AND BILATERAL SALPINGO-OOPHORECTOMY: Primary | ICD-10-CM

## 2018-07-16 DIAGNOSIS — Z90.722 S/P TOTAL HYSTERECTOMY AND BILATERAL SALPINGO-OOPHORECTOMY: Primary | ICD-10-CM

## 2018-07-16 PROCEDURE — G0463 HOSPITAL OUTPT CLINIC VISIT: HCPCS

## 2018-07-16 PROCEDURE — 93010 ELECTROCARDIOGRAM REPORT: CPT | Mod: ZP | Performed by: INTERNAL MEDICINE

## 2018-07-16 PROCEDURE — 99204 OFFICE O/P NEW MOD 45 MIN: CPT | Mod: ZP | Performed by: INTERNAL MEDICINE

## 2018-07-16 PROCEDURE — 93005 ELECTROCARDIOGRAM TRACING: CPT

## 2018-07-16 ASSESSMENT — PAIN SCALES - GENERAL: PAINLEVEL: NO PAIN (0)

## 2018-07-16 NOTE — MR AVS SNAPSHOT
After Visit Summary   7/16/2018    Katie Hernandez    MRN: [id number]           Patient Information     Date Of Birth          1960        Visit Information        Provider Department      7/16/2018 2:30 PM Mae Etienne MD [facility] [place]        Today's Diagnoses     Fatigue    -  1      Care Instructions    Patient Instructions:  It was a pleasure to see you in the cardiology clinic today.      If you have any questions, call  Florina Dailey RN, at [phone].  Press Option #1 for the [facility], and then press Option #3 for nursing.  We are encouraging the use of Stylistpick to communicate with your HealthCare Provider    Note the new medications: consider 81 mg of aspirin for cardiac protection  Stop the following medications: none    The results from today include: calcium score  Please follow up with Dr. Mae Etienne as needed      If you have an urgent need after hours (8:00 am to 4:30 pm) please call [phone] and ask for the cardiology fellow on call.            Follow-ups after your visit        Your next 10 appointments already scheduled     Jul 17, 2018  2:40 PM CDT   (Arrive by 2:25 PM)   CT CALCIUM SCREENING with UUCT4   [facility], [place], [place] ([facility])    [address] [address] [phone]           It is best to avoid caffeine on the day of your test.  Be sure to tell your doctor:   If there s any chance you are pregnant.   If you have any special needs.  Please wear loose clothing, such as a sweat suit or jogging clothes. Avoid snaps, zippers and other metal. We may ask you to undress and put on a hospital gown.  If you have any questions, please call the Imaging Department where you will have your exam.              Future tests that were ordered for you today     Open Future Orders        Priority Expected Expires Ordered    CT Coronary Calcium Scan Routine  10/14/2018  "7/16/2018            Who to contact     If you have questions or need follow up information about today's clinic visit or your schedule please contact [facility] directly at [phone].  Normal or non-critical lab and imaging results will be communicated to you by MyChart, letter or phone within 4 business days after the clinic has received the results. If you do not hear from us within 7 days, please contact the clinic through MyChart or phone. If you have a critical or abnormal lab result, we will notify you by phone as soon as possible.  Submit refill requests through [employer]mob or call your pharmacy and they will forward the refill request to us. Please allow 3 business days for your refill to be completed.          Additional Information About Your Visit        [employer] Information     [employer]ather[employer] gives you secure access to your electronic health record. If you see a primary care provider, you can also send messages to your care team and make appointments. If you have questions, please call your primary care clinic.  If you do not have a primary care provider, please call [phone] and they will assist you.        Care EveryWhere ID     This is your Care EveryWhere ID. This could be used by other organizations to access your [facility] [facility] medical records  [id number]        Your Vitals Were     Pulse Respirations Height Last Period Pulse Oximetry BMI (Body Mass Index)    71 18 1.626 m (5' 4\") 08/20/2013 97% 24.17 kg/m2       Blood Pressure from Last 3 Encounters:   07/16/18 110/74   06/05/18 105/71   02/08/18 96/69    Weight from Last 3 Encounters:   07/16/18 63.9 kg (140 lb 12.8 oz)   06/05/18 63 kg (139 lb)   02/08/18 63 kg (139 lb)              We Performed the Following     EKG 12-lead, tracing only (Same Day)        Primary Care Provider Office Phone # Fax #    Mahesh Santoyo [phone] [address]  [facility] [address]        Equal Access to Services     RUBI YAN " AH: Stacy perez Theresaali, wadebbieda luqadaha, qaybta kaysabel rosinamariejaidenramila gaviota hayyecenia stormbryanjani green. So [facility] [phone].    ATENCIÓN: Si habla español, tiene a montgomery disposición servicios gratuitos de asistencia lingüística. Llame al [phone].    We comply with applicable federal civil rights laws and [place] laws. We do not discriminate on the basis of race, color, national origin, age, disability, sex, sexual orientation, or gender identity.            Thank you!     Thank you for choosing [facility]  for your care. Our goal is always to provide you with excellent care. Hearing back from our patients is one way we can continue to improve our services. Please take a few minutes to complete the written survey that you may receive in the mail after your visit with us. Thank you!             Your Updated Medication List - Protect others around you: Learn how to safely use, store and throw away your medicines at www.disposemymeds.org.          This list is accurate as of 7/16/18  3:34 PM.  Always use your most recent med list.                   Brand Name Dispense Instructions for use Diagnosis    calcium carbonate 500 MG tablet    OS-MINERVA 500 mg [place]. Ca     Take 1 tablet by mouth 2 times daily        CO Q-10 PO      Take 1 capsule by mouth daily        COLLAGEN PO           GLUTATHIONE PO           magnesium 100 MG Caps      Take by mouth daily        VITAMIN A PO      Take 10,000 Units by mouth daily        vitamin B complex with vitamin C Tabs tablet      Take 1 tablet by mouth daily        VITAMIN C CR PO      Take 1 tablet by mouth daily.        VITAMIN D (CHOLECALCIFEROL) PO      Take by mouth daily        VITAMIN E COMPLEX PO           VITEYES AREDS FORMULA/LUTEIN Caps      Take 1 tablet by mouth daily

## 2018-07-16 NOTE — LETTER
7/16/2018      RE: Katie Hernandez  [address]  [place] [address]       Dear Colleague,    Thank you for the opportunity to participate in the care of your patient, Katie Hernandez, at the [facility]. Please see a copy of my visit note below.    HPI:       Ms. Katie Hernandez is 57 year old woman with history of leiomyoma status post hysterectomy, asthma, colitis, and anemia, who was seen in consultation today requested by Chelsi Ruiz MD, for  chest pain.     Patient reports a long-standing history of chest pain which now occurs about 2-3 times a week. This typically occurs when she is unable to fall asleep.  This never occurs when she exercises.  Prior to her hysterectomy, patient had noticed chest pain when she was anemic. Her hemoglobin now is 13 gm/dL. She is extremely worried about her cardiovascular health since her father had a stroke in the 60s which was thought to be cryptogenic.  She tries to eat healthy and is very interested in taking antioxidants to prevent heart disease and has several questions on the benefits of ginkgo biloba, vitamin E and other antioxidants.  She does report feeling anxious quite frequently and has had insomnia for several years since her hysterectomy.  She also reports night sweats/hot flashes for which she has tried melatonin.  She is postmenopausal and has not tried estrogen replacement therapy.  She was recently evaluated in the neurology clinic for internal tremors an oh d there was no focal neurological problems identified.    She today denies any chest pain, dyspnea, presyncope, syncope, palpitations, orthopnea or PND.  She she has noticed intermittent peripheral edema.  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 Dailey and nl     Abnormal stools 5/30/2013     Anemia     \"Related to heavy periods\"     Breast disorder 2014, 2017    Pain, lump     Complex endometrial hyperplasia with atypia 10/17/2013    Path dx w complete hysterectomy.      H/O colonoscopy 03/2017    nl, bx nl     Kidney cysts     left seen on CT     Macular degeneration 2015    Taking vitamin.     Menarche     cycles q 28 x 5 d     Menorrhagia 5/16/2007    Oct 2009 ->anemia to 7+ gms.  Resolved with OTC po FE 50 mg/d      Postpartum depression     Only for a few days     Precancerous skin lesion     excised -- abdomen. ->yrly Derm cks     S/P total hysterectomy and bilateral salpingo-oophorectomy 10/17/2013    Path: -->Complex endometrial hyperplasia with atypia.      SOB (shortness of breath) 04/2017    also elevated d dimer - ct no pe, some fibrosis or atelect, breast cysts, then est echo nl 4/17     Swelling of the ankle, feet, or leg     Ankles     Uncomplicated asthma     Childhood. Currently have sensitive airways.     Unspecified hemorrhoids without mention of complication 2007    bleeding to anemia -- hgb 8.4 and lower....     Uterine leiomyoma 5/13/2009    [place] diagnosis while in [place] 8/10/2011 US in this EMR confirms multiple myomas with a 12 mm endometrial stripe.  (Problem list name updated by automated process. Provider to review and confirm*       CURRENT MEDICATIONS:  Current Outpatient Prescriptions   Medication Sig Dispense Refill     Ascorbic Acid (VITAMIN C CR PO) Take 1 tablet by mouth daily.       calcium carbonate (OS-MINERVA 500 MG [place]. CA) 1250 MG tablet Take 1 tablet by mouth 2 times daily       magnesium 100 MG CAPS Take by mouth daily       Multiple Vitamins-Minerals (VITEYES AREDS FORMULA/LUTEIN) CAPS Take 1 tablet by mouth daily       VITAMIN A PO Take 10,000 Units by mouth daily       VITAMIN D, CHOLECALCIFEROL, PO Take by mouth daily       VITAMIN E COMPLEX PO        Coenzyme Q10 (CO Q-10 " PO) Take 1 capsule by mouth daily       COLLAGEN PO        GLUTATHIONE PO        vitamin B complex with vitamin C (VITAMIN  B COMPLEX) TABS tablet Take 1 tablet by mouth daily         PAST SURGICAL HISTORY:  Past Surgical History:   Procedure Laterality Date     APPENDECTOMY OPEN  age 3     BIOPSY      uterine, skin     C LAP,SURG,COLECTOMY, PARTIAL, W/ANAST  age 3    tumor in colon as child (removed tumor and appendix)      SECTION   and      COLONOSCOPY      virtual     COLONOSCOPY  2013    Procedure: COLONOSCOPY;  COLONOSCOPY;  Surgeon: Db Reed MD;  Location:  GI     COLONOSCOPY N/A 3/14/2017    Procedure: COMBINED COLONOSCOPY, SINGLE OR MULTIPLE BIOPSY/POLYPECTOMY BY BIOPSY;  Surgeon: Db Reed MD;  Location:  GI     DAVINCI HYSTERECTOMY TOTAL, BILATERAL SALPINGO-OOPHORECTOMY, COMBINED  10/17/2013    Procedure: COMBINED DAVINCI HYSTERECTOMY TOTAL, SALPINGO-OOPHORECTOMY;  DaVinci Assisted Total Laparoscopic Hysterectomy/Bilateral Salpingo Oophorectomy Cystoscopy  ;  Surgeon: Zoya Vance MD;  Location: [facility] OR     DILATION AND CURETTAGE, OPERATIVE HYSTEROSCOPY WITH MORCELLATOR, COMBINED  2013    Procedure: COMBINED DILATION AND CURETTAGE, OPERATIVE HYSTEROSCOPY WITH MORCELLATOR;  Dilation and Curettage, Hysteroscopy, Hysteroscopic  Polypectomy with Myosure ;  Surgeon: Concepción Gamboa MD;  Location: [facility] OR     LAPAROSCOPY DIAGNOSTIC (GYN)      ectopic excision       ALLERGIES     Allergies   Allergen Reactions     Hydrocodone Other (See Comments)     Pt fainted     Penicillins Hives     Doxycycline Other (See Comments)     Feeling of bugs crawling on skin and tinnitus     Hibiclens      Itching after preop washing.       FAMILY HISTORY:  Family History   Problem Relation Age of Onset     Diabetes Father 75     DMII     Hypertension Father 65     Other Cancer Father      Carcinoma     Cerebrovascular Disease Father      Hypertension  "Mother 45     Eye Disorder Mother      macular degeneration     Psychotic Disorder Mother      Borderline Personality Disorder ([id number])     Anxiety Disorder Mother      Mental Illness Mother      Breast Cancer Paternal Grandmother 45      age 55     Cancer Maternal Grandmother 60     uterine/breast     Uterine Cancer Maternal Grandmother      Cancer Maternal Aunt 60     ovarian     Cancer - colorectal Maternal Aunt      possible     Breast Cancer Other      Uterine Cancer Other      Mental Illness Other      Asthma Brother        SOCIAL HISTORY:  Social History     Social History     Marital status:      Spouse name: N/A     Number of children: 2     Years of education: N/A     Occupational History     Faculty [employer]     Teaches Translation     Social History Main Topics     Smoking status: Never Smoker     Smokeless tobacco: Never Used     Alcohol use Yes      Comment: 1/2 d / mo     Drug use: No     Sexual activity: Yes     Partners: Male     Birth control/ protection: Post-menopausal      Comment: Vastectomy     Other Topics Concern     Blood Transfusions No     Caffeine Concern No     Stopped all caffiene 2nd to breast pain     Occupational Exposure No     Hobby Hazards No     Sleep Concern No     Stress Concern No     Weight Concern No     Special Diet No     Back Care No     Exercise No     45' aerobic 3xs/wk     Bike Helmet No     Seat Belt No     Social History Narrative       ROS:   Constitutional: No fever, chills, or sweats. No weight gain/loss   ENT: No visual disturbance, ear ache, epistaxis, sore throat  Allergies/Immunologic: Negative.   Respiratory: No cough, hemoptysia  Cardiovascular: As per HPI  GI: No nausea, vomiting, hematemesis, melena, or hematochezia  : No urinary frequency, dysuria, or hematuria  Integument: Negative  Psychiatric: Negative  Neuro: Negative  Endocrinology: Negative   Musculoskeletal: Negative    EXAM:  /74  Pulse 71  Resp 18  Ht 1.626 m (5' 4\")  Wt " 63.9 kg (140 lb 12.8 oz)  LMP 08/20/2013  SpO2 97%  BMI 24.17 kg/m2  In general, the patient is a pleasant female in no apparent distress.      HEENT: NC/AT.  PERRLA.  EOMI.  Sclerae white, not injected.    Neck: Carotids 2+ bilaterally without bruits.  No jugular venous distension.   Lymph: No cervical adenopathy. No thyromegaly.   Heart: RRR. Normal S1, S2. No murmur, rub, click, or gallop. There is no heave.    Lungs: Clear bilaterally.  No rhonchi, wheezes, rales.   GI: Soft, nontender, nondistended.   Extremities: No edema.  The pulses are 2+at the radial and DP bilaterally.  Neuro: grossly non focal.   Skin: no rashes.  Musculoskeletal: normal muscle strength, no acute arthritis, gait normal.      Labs:  LIPID RESULTS:  Lab Results   Component Value Date    CHOL 132 07/20/2016    HDL 78 07/20/2016    LDL 38 07/20/2016    TRIG 79 07/20/2016    CHOLHDLRATIO 1.7 12/18/2013    NHDL 54 07/20/2016       LIVER ENZYME RESULTS:  Lab Results   Component Value Date    AST 14 04/06/2017    ALT 21 04/06/2017       CBC RESULTS:  Lab Results   Component Value Date    WBC 6.5 09/12/2017    RBC 4.33 09/12/2017    HGB 13.4 09/12/2017    HCT 39.1 09/12/2017    MCV 90 09/12/2017    MCH 30.9 09/12/2017    MCHC 34.3 09/12/2017    RDW 13.7 09/12/2017     09/12/2017       BMP RESULTS:  Lab Results   Component Value Date     09/12/2017    POTASSIUM 3.9 09/12/2017    CHLORIDE 107 09/12/2017    CO2 22 09/12/2017    ANIONGAP 12 09/12/2017     (H) 09/12/2017    BUN 11 09/12/2017    CR 0.73 09/12/2017    GFRESTIMATED 82 09/12/2017    GFRESTBLACK >90 09/12/2017    MINERVA 9.0 09/12/2017        A1C RESULTS:  No results found for: A1C    INR RESULTS:  No results found for: INR    Cardiac data:    ECG today shows NSR with no acute ST-T changes          Stress echo May 2017  The patient exercised 9:30.  Exercise was stopped due to fatigue.  There was a normal BP response to exercise.  The patient exhibited no chest pain  during exercise.  A treadmill exercise test according to the Jd protocol was performed.  Target Heart Rate was achieved.  The EKG portion of this stress test was negative for inducible ischemia (see  echo results below).  A moderate workload was achieved.  No arrhythmia noted.  The Duke treadmill score was low risk ( >5 Duke score).  The visual ejection fraction is estimated at >70%.  Left ventricular cavity size decreases with exercise.  Global LV systolic function augments with exercise.  Normal resting wall motion and no stress-induced wall motion abnormality.    Assessment and Plan:     Katie Hernandez is a 57 year old post motor menopausal woman with chronic chest pain.    Chest pain is somewhat is atypical for angina.   Her cardiac investigations including ECG from today and a stress echo test from May 2017 were reviewed in detail.  ECG is notable for normal sinus rhythm without any acute ST-T wave changes.  Stress echo from May 2017 notable for no evidence of ischemia.  Her heart rate and blood pressure response to exercise was normal.  She exercised for about 9-1/2 minutes and did not develop any angina. On exam she has no signs of heart failure.  Her blood pressure today is well controlled.  My recommendations for her as follows  1.  Coronary calcium scoring to assess for atherosclerosis  2.  We discussed about maintaining a healthy lifestyle with regular exercise and diet low in salt, fat and carbohydrates  3.  Aspirin 81 mg daily for CVD prevention in light of family history of stroke  4. We also discussed Askew prevention screening for additional reassurance which she will consider after the completion of the coronary calcium scoring.  5.  I have encouraged her to follow-up with Dr. Hay for considerations of low-dose estrogen replacement given the hot flashes and the insomnia.    I have not arranged for a follow-up appointment and I will be happy to see her if necessary.  We will follow-up  with her on the results of the coronary calcium score.    I spent 45  minutes in total, and more than 50% of time was spent counseling the patient and answering all questions from her today.    Mae Etienne MD, MS  Staff Cardiologist, [facility]   Pager: [phone]      Patient Care Team:  Mahesh Santoyo MD as PCP - General (Internal Medicine)  Chelsi Fiore MD as MD (OB/Gyn)  Mae Etienne MD as MD (Cardiology)  CHELSI FIORE

## 2018-07-16 NOTE — NURSING NOTE
Cardiac Testing: Patient given instructions regarding  Coronary calcium score. Discussed purpose, preparation, procedure and when to expect results reported back to the patient. Patient demonstrated understanding of this information and agreed to call with further questions or concerns.  Return Appointment: Patient given instructions regarding scheduling next clinic visit. Patient demonstrated understanding of this information and agreed to call with further questions or concerns.  Patient stated she understood all health information given and agreed to call with further questions or concerns.

## 2018-07-16 NOTE — NURSING NOTE
Chief Complaint   Patient presents with     Consult     New consult with Katie concerning her Fatigue     Medications and vitals reviewed with patient and confirmed.  Boo Leach CMA at 2:29 PM on 7/16/2018

## 2018-07-16 NOTE — PATIENT INSTRUCTIONS
Patient Instructions:  It was a pleasure to see you in the cardiology clinic today.      If you have any questions, call  Florina Dailey RN, at [phone].  Press Option #1 for the [facility], and then press Option #3 for nursing.  We are encouraging the use of [employer] to communicate with your HealthCare Provider    Note the new medications: consider 81 mg of aspirin for cardiac protection  Stop the following medications: none    The results from today include: calcium score  Please follow up with Dr. Mae Etienne as needed      If you have an urgent need after hours (8:00 am to 4:30 pm) please call [phone] and ask for the cardiology fellow on call.

## 2018-07-16 NOTE — PROGRESS NOTES
"HPI:       Ms. Katie Hernandez is 57 year old woman with history of leiomyoma status post hysterectomy, asthma, colitis, and anemia, who was seen in consultation today requested by Chelsi Ruiz MD, for chest pain.     Patient reports a long-standing history of chest pain which now occurs about 2-3 times a week. This typically occurs when she is unable to fall asleep.  This never occurs when she exercises.  Prior to her hysterectomy, patient had noticed chest pain when she was anemic. Her hemoglobin now is 13 gm/dL. She is extremely worried about her cardiovascular health since her father had a stroke in the 60s which was thought to be cryptogenic.  She tries to eat healthy and is very interested in taking antioxidants to prevent heart disease and has several questions on the benefits of ginkgo biloba, vitamin E and other antioxidants.  She does report feeling anxious quite frequently and has had insomnia for several years since her hysterectomy.  She also reports night sweats/hot flashes for which she has tried melatonin.  She is postmenopausal and has not tried estrogen replacement therapy.  She was recently evaluated in the neurology clinic for internal tremors an oh d there was no focal neurological problems identified.    She today denies any chest pain, dyspnea, presyncope, syncope, palpitations, orthopnea or PND.  She she has noticed intermittent peripheral edema.  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 Dailey and nl     Abnormal stools 5/30/2013     Anemia     \"Related to heavy periods\"     Breast disorder 2014, 2017    Pain, lump     Complex endometrial hyperplasia with atypia 10/17/2013    Path dx w complete hysterectomy.      H/O colonoscopy 03/2017    nl, bx nl     Kidney cysts     left " seen on CT     Macular degeneration 2015    Taking vitamin.     Menarche     cycles q 28 x 5 d     Menorrhagia 5/16/2007    Oct 2009 ->anemia to 7+ gms.  Resolved with OTC po FE 50 mg/d      Postpartum depression     Only for a few days     Precancerous skin lesion     excised -- abdomen. ->yrly Derm cks     S/P total hysterectomy and bilateral salpingo-oophorectomy 10/17/2013    Path: -->Complex endometrial hyperplasia with atypia.      SOB (shortness of breath) 04/2017    also elevated d dimer - ct no pe, some fibrosis or atelect, breast cysts, then est echo nl 4/17     Swelling of the ankle, feet, or leg     Ankles     Uncomplicated asthma     Childhood. Currently have sensitive airways.     Unspecified hemorrhoids without mention of complication 2007    bleeding to anemia -- hgb 8.4 and lower....     Uterine leiomyoma 5/13/2009    [place] diagnosis [place] in [place] 8/[place]/2011 US in this EMR confirms multiple myomas with a 12 mm endometrial stripe.  (Problem list name updated by automated process. Provider to review and confirm*       CURRENT MEDICATIONS:  Current Outpatient Prescriptions   Medication Sig Dispense Refill     Ascorbic Acid (VITAMIN C CR PO) Take 1 tablet by mouth daily.       calcium carbonate (OS-MINERVA 500 MG [place]. [place]) 1250 MG tablet Take 1 tablet by mouth 2 times daily       magnesium 100 MG CAPS Take by mouth daily       Multiple Vitamins-Minerals (VITEYES AREDS FORMULA/LUTEIN) CAPS Take 1 tablet by mouth daily       VITAMIN A PO Take 10,000 Units by mouth daily       VITAMIN D, CHOLECALCIFEROL, PO Take by mouth daily       VITAMIN E COMPLEX PO        Coenzyme Q10 (CO Q-10 PO) Take 1 capsule by mouth daily       COLLAGEN PO        GLUTATHIONE PO        vitamin B complex with vitamin C (VITAMIN  B COMPLEX) TABS tablet Take 1 tablet by mouth daily         PAST SURGICAL HISTORY:  Past Surgical History:   Procedure Laterality Date     APPENDECTOMY OPEN  age 3     BIOPSY  2013    uterine, skin     C  LAP,SURG,COLECTOMY, PARTIAL, W/ANAST  age 3    tumor in colon as child (removed tumor and appendix)      SECTION   and      COLONOSCOPY      virtual     COLONOSCOPY  2013    Procedure: COLONOSCOPY;  COLONOSCOPY;  Surgeon: Db Reed MD;  Location:  GI     COLONOSCOPY N/A 3/14/2017    Procedure: COMBINED COLONOSCOPY, SINGLE OR MULTIPLE BIOPSY/POLYPECTOMY BY BIOPSY;  Surgeon: Db Reed MD;  Location:  GI     DAVINCI HYSTERECTOMY TOTAL, BILATERAL SALPINGO-OOPHORECTOMY, COMBINED  10/17/2013    Procedure: COMBINED DAVINCI HYSTERECTOMY TOTAL, SALPINGO-OOPHORECTOMY;  DaVinci Assisted Total Laparoscopic Hysterectomy/Bilateral Salpingo Oophorectomy Cystoscopy  ;  Surgeon: Zoya Vance MD;  Location: [facility]     DILATION AND CURETTAGE, OPERATIVE HYSTEROSCOPY WITH MORCELLATOR, COMBINED  2013    Procedure: COMBINED DILATION AND CURETTAGE, OPERATIVE HYSTEROSCOPY WITH MORCELLATOR;  Dilation and Curettage, Hysteroscopy, Hysteroscopic  Polypectomy with Myosure ;  Surgeon: Concepción Gamboa MD;  Location: [facility] OR     LAPAROSCOPY DIAGNOSTIC (GYN)      ectopic excision       ALLERGIES     Allergies   Allergen Reactions     Hydrocodone Other (See Comments)     Pt fainted     Penicillins Hives     Doxycycline Other (See Comments)     Feeling of bugs crawling on skin and tinnitus     Hibiclens      Itching after preop washing.       FAMILY HISTORY:  Family History   Problem Relation Age of Onset     Diabetes Father 75     DMII     Hypertension Father 65     Other Cancer Father      Carcinoma     Cerebrovascular Disease Father      Hypertension Mother 45     Eye Disorder Mother      macular degeneration     Psychotic Disorder Mother      Borderline Personality Disorder ([id number])     Anxiety Disorder Mother      Mental Illness Mother      Breast Cancer Paternal Grandmother 45      age 55     Cancer Maternal Grandmother 60     uterine/breast     Uterine Cancer  "Maternal Grandmother      Cancer Maternal Aunt 60     ovarian     Cancer - colorectal Maternal Aunt      possible     Breast Cancer Other      Uterine Cancer Other      Mental Illness Other      Asthma Brother        SOCIAL HISTORY:  Social History     Social History     Marital status:      Spouse name: N/A     Number of children: 2     Years of education: N/A     Occupational History     Faculty [employer]     Teaches Translation     Social History Main Topics     Smoking status: Never Smoker     Smokeless tobacco: Never Used     Alcohol use Yes      Comment: 1/2 d / mo     Drug use: No     Sexual activity: Yes     Partners: Male     Birth control/ protection: Post-menopausal      Comment: Vastectomy     Other Topics Concern     Blood Transfusions No     Caffeine Concern No     Stopped all caffiene 2nd to breast pain     Occupational Exposure No     Hobby Hazards No     Sleep Concern No     Stress Concern No     Weight Concern No     Special Diet No     Back Care No     Exercise No     45' aerobic 3xs/wk     Bike Helmet No     Seat Belt No     Social History Narrative       ROS:   Constitutional: No fever, chills, or sweats. No weight gain/loss   ENT: No visual disturbance, ear ache, epistaxis, sore throat  Allergies/Immunologic: Negative.   Respiratory: No cough, hemoptysia  Cardiovascular: As per HPI  GI: No nausea, vomiting, hematemesis, melena, or hematochezia  : No urinary frequency, dysuria, or hematuria  Integument: Negative  Psychiatric: Negative  Neuro: Negative  Endocrinology: Negative   Musculoskeletal: Negative    EXAM:  /74  Pulse 71  Resp 18  Ht 1.626 m (5' 4\")  Wt 63.9 kg (140 lb 12.8 oz)  LMP 08/20/2013  SpO2 97%  BMI 24.17 kg/m2  In general, the patient is a pleasant female in no apparent distress.      HEENT: NC/AT.  TREVIN.  EOMI.  Sclerae white, not injected.    Neck: Carotids 2+ bilaterally without bruits.  No jugular venous distension.   Lymph: No cervical adenopathy. No " thyromegaly.   Heart: RRR. Normal S1, S2. No murmur, rub, click, or gallop. There is no heave.    Lungs: Clear bilaterally.  No rhonchi, wheezes, rales.   GI: Soft, nontender, nondistended.   Extremities: No edema.  The pulses are 2+at the radial and DP bilaterally.  Neuro: grossly non focal.   Skin: no rashes.  Musculoskeletal: normal muscle strength, no acute arthritis, gait normal.      Labs:  LIPID RESULTS:  Lab Results   Component Value Date    CHOL 132 07/20/2016    HDL 78 07/20/2016    LDL 38 07/20/2016    TRIG 79 07/20/2016    CHOLHDLRATIO 1.7 12/18/2013    NHDL 54 07/20/2016       LIVER ENZYME RESULTS:  Lab Results   Component Value Date    AST 14 04/06/2017    ALT 21 04/06/2017       CBC RESULTS:  Lab Results   Component Value Date    WBC 6.5 09/12/2017    RBC 4.33 09/12/2017    HGB 13.4 09/12/2017    HCT 39.1 09/12/2017    MCV 90 09/12/2017    MCH 30.9 09/12/2017    MCHC 34.3 09/12/2017    RDW 13.7 09/12/2017     09/12/2017       BMP RESULTS:  Lab Results   Component Value Date     09/12/2017    POTASSIUM 3.9 09/12/2017    CHLORIDE 107 09/12/2017    CO2 22 09/12/2017    ANIONGAP 12 09/12/2017     (H) 09/12/2017    BUN 11 09/12/2017    CR 0.73 09/12/2017    GFRESTIMATED 82 09/12/2017    GFRESTBLACK >90 09/12/2017    MINERVA 9.0 09/12/2017        A1C RESULTS:  No results found for: A1C    INR RESULTS:  No results found for: INR    Cardiac data:    ECG today shows NSR with no acute ST-T changes          Stress echo May 2017  The patient exercised 9:30.  Exercise was stopped due to fatigue.  There was a normal BP response to exercise.  The patient exhibited no chest pain during exercise.  A treadmill exercise test according to the Jd protocol was performed.  Target Heart Rate was achieved.  The EKG portion of this stress test was negative for inducible ischemia (see  echo results below).  A moderate workload was achieved.  No arrhythmia noted.  The Duke treadmill score was low risk ( >5  Alberto score).  The visual ejection fraction is estimated at >70%.  Left ventricular cavity size decreases with exercise.  Global LV systolic function augments with exercise.  Normal resting wall motion and no stress-induced wall motion abnormality.    Assessment and Plan:       Katie Hernandez is a 57 year old post motor menopausal woman with chronic chest pain.    Chest pain is somewhat is atypical for angina.   Her cardiac investigations including ECG from today and a stress echo test from May 2017 were reviewed in detail.  ECG is notable for normal sinus rhythm without any acute ST-T wave changes.  Stress echo from May 2017 notable for no evidence of ischemia.  Her heart rate and blood pressure response to exercise was normal.  She exercised for about 9-1/2 minutes and did not develop any angina. On exam she has no signs of heart failure.  Her blood pressure today is well controlled.  My recommendations for her as follows  1.  Coronary calcium scoring to assess for atherosclerosis  2.  We discussed about maintaining a healthy lifestyle with regular exercise and diet low in salt, fat and carbohydrates  3.  Aspirin 81 mg daily for CVD prevention in light of family history of stroke  4. We also discussed [place] prevention screening for additional reassurance which she will consider after the completion of the coronary calcium scoring.  5.  I have encouraged her to follow-up with Dr. Hay for considerations of low-dose estrogen replacement given the hot flashes and the insomnia.      I have not arranged for a follow-up appointment and I will be happy to see her if necessary.  We will follow-up with her on the results of the coronary calcium score.    I spent 45  minutes in total, and more than 50% of time was spent counseling the patient and answering all questions from her today.    Mae Etienne MD, MS  Staff Cardiologist, [facility]   Pager: [phone]      Patient Care Team:  Natanael  Mahesh Khalil MD as PCP - General (Internal Medicine)  Chelsi Fiore MD as MD (OB/Gyn)  Mae Etienne MD as MD (Cardiology)  CHELSI FIORE      Answers for HPI/ROS submitted by the patient on 7/2/2018   General Symptoms: Yes  Skin Symptoms: Yes  HENT Symptoms: Yes  EYE SYMPTOMS: Yes  HEART SYMPTOMS: Yes  LUNG SYMPTOMS: Yes  INTESTINAL SYMPTOMS: Yes  URINARY SYMPTOMS: No  GYNECOLOGIC SYMPTOMS: No  BREAST SYMPTOMS: No  SKELETAL SYMPTOMS: Yes  BLOOD SYMPTOMS: No  NERVOUS SYSTEM SYMPTOMS: Yes  MENTAL HEALTH SYMPTOMS: Yes  Fever: No  Loss of appetite: No  Weight loss: No  Weight gain: No  Fatigue: Yes  Night sweats: Yes  Chills: Yes  Increased stress: Yes  Excessive hunger: No  Excessive thirst: No  Feeling hot or cold when others believe the temperature is normal: Yes  Loss of height: No  Post-operative complications: No  Surgical site pain: Yes  Hallucinations: No  Change in or Loss of Energy: Yes  Hyperactivity: No  Confusion: No  Changes in hair: No  Changes in moles/birth marks: Yes  Itching: Yes  Rashes: Yes  Changes in nails: Yes  Acne: No  Hair in places you don't want it: No  Change in facial hair: No  Warts: No  Non-healing sores: No  Scarring: No  Flaking of skin: No  Color changes of hands/feet in cold : Yes  Sun sensitivity: No  Skin thickening: No  Ear pain: No  Ear discharge: No  Hearing loss: No  Tinnitus: Yes  Nosebleeds: No  Congestion: No  Sinus pain: Yes  Trouble swallowing: No   Voice hoarseness: Yes  Mouth sores: Yes  Sore throat: No  Tooth pain: No  Gum tenderness: No  Bleeding gums: No  Change in taste: No  Change in sense of smell: No  Dry mouth: No  Hearing aid used: No  Neck lump: No  Eye pain: Yes  Vision loss: Yes  Dry eyes: Yes  Watery eyes: No  Eye bulging: No  Double vision: No  Flashing of lights: Yes  Spots: No  Floaters: Yes  Redness: No  Crossed eyes: No  Tunnel Vision: No  Yellowing of eyes: No  Eye irritation: Yes  Cough: Yes  Sputum or phlegm: No  Coughing up blood:  No  Difficulty breating or shortness of breath: No  Snoring: No  Wheezing: No  Difficulty breathing on exertion: Yes  Nighttime Cough: Yes  Difficulty breathing when lying flat: No  Chest pain or pressure: Yes  Fast or irregular heartbeat: Yes  Pain in legs with walking: Yes  Trouble breathing while lying down: No  Fingers or toes appear blue: No  High blood pressure: No  Low blood pressure: Yes  Fainting: No  Murmurs: No  Pacemaker: No  Varicose veins: Yes  Edema or swelling: Yes  Wake up at night with shortness of breath: No  Light-headedness: Yes  Exercise intolerance: Yes  Heart burn or indigestion: No  Nausea: Yes  Vomiting: No  Abdominal pain: Yes  Bloating: Yes  Constipation: Yes  Diarrhea: Yes  Blood in stool: No  Black stools: No  Rectal or Anal pain: Yes  Fecal incontinence: No  Yellowing of skin or eyes: No  Vomit with blood: No  Change in stools: Yes  Back pain: No  Muscle aches: Yes  Neck pain: No  Swollen joints: Yes  Joint pain: Yes  Bone pain: No  Muscle cramps: No  Muscle weakness: No  Joint stiffness: Yes  Bone fracture: No  Trouble with coordination: No  Dizziness or trouble with balance: Yes  Fainting or black-out spells: No  Memory loss: No  Headache: No  Seizures: No  Speech problems: No  Tingling: No  Tremor: Yes  Weakness: Yes  Difficulty walking: No  Paralysis: No  Numbness: No  Nervous or Anxious: Yes  Depression: No  Trouble sleeping: Yes  Trouble thinking or concentrating: Yes  Mood changes: Yes  Panic attacks: No

## 2018-07-17 ENCOUNTER — HOSPITAL ENCOUNTER (OUTPATIENT)
Dept: CT IMAGING | Facility: CLINIC | Age: 58
Discharge: HOME | End: 2018-07-17
Attending: INTERNAL MEDICINE | Admitting: INTERNAL MEDICINE
Payer: COMMERCIAL

## 2018-07-17 DIAGNOSIS — Z87.898 HISTORY OF CHEST PAIN: ICD-10-CM

## 2018-07-17 LAB — INTERPRETATION ECG - MUSE: NORMAL

## 2018-07-17 PROCEDURE — 75571 CT HRT W/O DYE W/CA TEST: CPT

## 2018-07-17 PROCEDURE — 75571 CT HRT W/O DYE W/CA TEST: CPT | Mod: 26 | Performed by: INTERNAL MEDICINE

## 2018-08-15 ENCOUNTER — TRANSFERRED RECORDS (OUTPATIENT)
Dept: HEALTH INFORMATION MANAGEMENT | Facility: CLINIC | Age: 58
End: 2018-08-15

## 2018-08-28 NOTE — H&P
"[facility]    History and Physical  Colon and Rectal Surgery     Date of Admission:  3/14/2017      Assessment & Plan   Katie Hernandez is a 56 year old female who presents for colonoscopy.    Indication: abnormal CT scan, rule out colitis  Plan for Colonoscopy with possible biopsy, possible polypectomy. We discussed the risks, benefits and alternatives and the patient wished to proceed.    The above has been forwarded to the consulting provider.      Db Reed MD  [facility], [facility]  [phone]        Code Status   Full Code    Primary Care Physician   Genevieve Leong      History is obtained from the patient    History of Present Illness   Katie Hernandez is a 56 year old female who presents with abnormal CT scan (rule out colitis).    Past Medical History    I have reviewed this patient's medical history and updated it with pertinent information if needed.   Past Medical History   Diagnosis Date     Abnormal stools 5/30/2013     Anemia      \"Related to heavy periods\"     Complex endometrial hyperplasia with atypia 10/17/2013     Path dx w complete hysterectomy.      Kidney cysts      left seen on CT     Macular degeneration 2015     Taking vitamin.     Menarche      cycles q 28 x 5 d     Menorrhagia 5/16/2007     Oct 2009 ->anemia to 7+ gms.  Resolved with OTC po FE 50 mg/d      Precancerous skin lesion      excised -- abdomen. ->yrly Derm cks     S/P total hysterectomy and bilateral salpingo-oophorectomy 10/17/2013     Path: -->Complex endometrial hyperplasia with atypia.      Swelling of the ankle, feet, or leg      Ankles     Unspecified hemorrhoids without mention of complication 2007     bleeding to anemia -- hgb 8.4 and lower....     Uterine leiomyoma 5/13/2009     [place] diagnosis while [place] 8/10/2011 US in this EMR confirms multiple myomas with a 12 mm endometrial stripe.  (Problem list name updated by automated process. Provider to " review and confirm*       Past Surgical History   I have reviewed this patient's surgical history and updated it with pertinent information if needed.  Past Surgical History   Procedure Laterality Date     Colonoscopy       virtual     Appendectomy open  age 3      section   and      C lap,surg,colectomy, partial, w/anast  age 3     tumor in colon as child (removed tumor and appendix)     Laparoscopy diagnostic (gyn)       ectopic excision     Colonoscopy  2013     Procedure: COLONOSCOPY;  COLONOSCOPY;  Surgeon: Db Reed MD;  Location:  GI     Dilation and curettage, operative hysteroscopy with morcellator, combined  2013     Procedure: COMBINED DILATION AND CURETTAGE, OPERATIVE HYSTEROSCOPY WITH MORCELLATOR;  Dilation and Curettage, Hysteroscopy, Hysteroscopic  Polypectomy with Myosure ;  Surgeon: Concepción Gamboa MD;  [facility] OR     Davinci hysterectomy total, bilateral salpingo-oophorectomy, combined  10/17/2013     Procedure: COMBINED DAVINCI HYSTERECTOMY TOTAL, SALPINGO-OOPHORECTOMY;  DaVinci Assisted Total Laparoscopic Hysterectomy/Bilateral Salpingo Oophorectomy Cystoscopy  ;  Surgeon: Zoya Vance MD;  Location: [facility] OR       Prior to Admission Medications   Prior to Admission Medications   Prescriptions Last Dose Informant Patient Reported? Taking?   Ascorbic Acid (VITAMIN C CR PO) Past Week  Yes Yes   Sig: Take 1 tablet by mouth daily.   Calcium Carbonate-Vitamin D (CALCIUM + D PO) Past Week  Yes Yes   Coenzyme Q10 (CO Q-10 PO) Past Week  Yes Yes   Sig: Take 1 capsule by mouth daily   FISH OIL Past Week  Yes Yes   Sig: daily   Multiple Vitamins-Minerals (VITEYES AREDS FORMULA/LUTEIN) CAPS Past Week  Yes Yes   Sig: Take 1 tablet by mouth daily   magnesium 100 MG CAPS Past Week  Yes Yes   Sig: Take by mouth daily      Facility-Administered Medications: None     Allergies   Allergies   Allergen Reactions     Hydrocodone Other (See Comments)      Pt fainted     Penicillins Hives     Doxycycline Other (See Comments)     Feeling of bugs crawling on skin and tinnitus     Hibiclens      Itching after preop washing.       Social History   I have reviewed this patient's social history and updated it with pertinent information if needed. Katie Hernandez  reports that she has never smoked. She has never used smokeless tobacco. She reports that she drinks alcohol. She reports that she does not use illicit drugs.    Family History   I have reviewed this patient's family history and updated it with pertinent information if needed.   Family History   Problem Relation Age of Onset     DIABETES Father 75     DMII     Hypertension Father 65     Hypertension Mother 45     Eye Disorder Mother      macular degeneration     Psychotic Disorder Mother      Borderline Personality Disorder ([id number])     Breast Cancer Paternal Grandmother 45      age 55     CANCER Maternal Grandmother 60     uterine/breast     CANCER Maternal Aunt 60     ovarian     Cancer - colorectal Maternal Aunt      possible       Review of Systems   C: NEGATIVE for fever, chills, change in weight  E/M: NEGATIVE for ear, mouth and throat problems  R: NEGATIVE for significant cough or SOB  CV: NEGATIVE for chest pain, palpitations or peripheral edema    Physical Exam       BP: 119/88 Pulse: 78   Resp: 16 SpO2: 100 % O2 Device: None (Room air)    Vital Signs with Ranges  Pulse:  [78] 78  Resp:  [16] 16  BP: (119)/(88) 119/88  SpO2:  [100 %] 100 %  0 lbs 0 oz    Constitutional: awake, alert, cooperative, no apparent distress, and appears stated age  AIRWAY EXAM: Mallampatti Class I (visualization of the soft palate, fauces, uvula, anterior and posterior pillars)  Respiratory: No increased work of breathing, good air exchange, clear to auscultation bilaterally, no crackles or wheezing  Cardiovascular: Normal apical impulse, regular rate and rhythm, normal S1 and S2, no S3 or S4, and no murmur noted  ASA  Class: 2 - Mild systemic disease                 Attending MD Morin: 47F with ho pancreatitis, etoh binges presenting with severe upper abd pain, nausea and vomiting after recentl etoh binge. Exam notable for epigastric ttp concerning for pancreatitis, likely etoh induced. Ddx includes etoh withdrawal given mild tremulousness and n/v, will place on CIWA, start IV fluids, antiemetics, labs including lipase and reassess. Will consider advanced abdominal imaging if lipase wnl

## 2018-10-04 ENCOUNTER — TELEPHONE (OUTPATIENT)
Dept: OPHTHALMOLOGY | Facility: CLINIC | Age: 58
End: 2018-10-04

## 2018-10-04 NOTE — TELEPHONE ENCOUNTER
[facility]    Phone Message    May a detailed message be left on voicemail: yes    Reason for Call: Other: Pt called stating she received her summary of her up coming appt with Dr Horta. She is wanting to know why the visit types say New Cornea. Pt states she has macular degeneration. I adv that there was a referrel sent over from Dr. [email or web address]. I advised the pt that I could get clarification for her.Please advise thank you      Action Taken: Message routed to:  [facility]

## 2018-10-04 NOTE — TELEPHONE ENCOUNTER
Reviewed dr. Horta a cornea specialist and would be new to dr. Horta-- hence new cornea for our scheduling purposes    No new eye issues  H/o anterior basement membrane dystrophy-- seen by Dr. Bowles at [facility] in past and now retired    Dr. Francis seeing pt for retina/macular degeneration, but recommends comprehensive MD for eye care also-- was scheduled with dr. Horta    reveiwed dr. Horta will be moving in April out of state and recommended other providers for long term care-- Dr Elliott/Cristina    Pt was unable to make next weeks appt and appt cancelled    Pt stated appreciated information and will call to schedule with dr. Marychuy Holguin RN 3:44 PM 10/04/18

## 2018-11-10 ENCOUNTER — HEALTH MAINTENANCE LETTER (OUTPATIENT)
Age: 58
End: 2018-11-10

## 2018-12-14 ENCOUNTER — OFFICE VISIT (OUTPATIENT)
Dept: OBGYN | Facility: CLINIC | Age: 58
End: 2018-12-14
Attending: OBSTETRICS & GYNECOLOGY
Payer: COMMERCIAL

## 2018-12-14 VITALS
BODY MASS INDEX: 23.99 KG/M2 | SYSTOLIC BLOOD PRESSURE: 106 MMHG | WEIGHT: 140.5 LBS | HEIGHT: 64 IN | DIASTOLIC BLOOD PRESSURE: 69 MMHG | HEART RATE: 71 BPM

## 2018-12-14 DIAGNOSIS — G47.09 OTHER INSOMNIA: Primary | ICD-10-CM

## 2018-12-14 PROCEDURE — G0463 HOSPITAL OUTPT CLINIC VISIT: HCPCS | Mod: ZF

## 2018-12-14 RX ORDER — BIOTIN 5 MG
TABLET ORAL
COMMUNITY
End: 2019-09-10

## 2018-12-14 SDOH — SOCIAL STABILITY: SOCIAL NETWORK: IN A TYPICAL WEEK, HOW MANY TIMES DO YOU TALK ON THE PHONE WITH FAMILY, FRIENDS, OR NEIGHBORS?: ONCE A WEEK

## 2018-12-14 SDOH — HEALTH STABILITY: PHYSICAL HEALTH: ON AVERAGE, HOW MANY DAYS PER WEEK DO YOU ENGAGE IN MODERATE TO STRENUOUS EXERCISE (LIKE A BRISK WALK)?: 0 DAYS

## 2018-12-14 SDOH — SOCIAL STABILITY: SOCIAL NETWORK: HOW OFTEN DO YOU GET TOGETHER WITH FRIENDS OR RELATIVES?: THREE TIMES A WEEK

## 2018-12-14 SDOH — SOCIAL STABILITY: SOCIAL NETWORK: HOW OFTEN DO YOU ATTEND CHURCH OR RELIGIOUS SERVICES?: NEVER

## 2018-12-14 SDOH — HEALTH STABILITY: MENTAL HEALTH
STRESS IS WHEN SOMEONE FEELS TENSE, NERVOUS, ANXIOUS, OR CAN'T SLEEP AT NIGHT BECAUSE THEIR MIND IS TROUBLED. HOW STRESSED ARE YOU?: VERY MUCH

## 2018-12-14 SDOH — SOCIAL STABILITY: SOCIAL NETWORK
DO YOU BELONG TO ANY CLUBS OR ORGANIZATIONS SUCH AS CHURCH GROUPS UNIONS, FRATERNAL OR ATHLETIC GROUPS, OR SCHOOL GROUPS?: YES

## 2018-12-14 SDOH — SOCIAL STABILITY: SOCIAL NETWORK: ARE YOU MARRIED, WIDOWED, DIVORCED, SEPARATED, NEVER MARRIED, OR LIVING WITH A PARTNER?: MARRIED

## 2018-12-14 SDOH — SOCIAL STABILITY: SOCIAL NETWORK: HOW OFTEN DO YOU ATTENT MEETINGS OF THE CLUB OR ORGANIZATION YOU BELONG TO?: 1 TO 4 TIMES PER YEAR

## 2018-12-14 SDOH — HEALTH STABILITY: PHYSICAL HEALTH: ON AVERAGE, HOW MANY MINUTES DO YOU ENGAGE IN EXERCISE AT THIS LEVEL?: 0 MIN

## 2018-12-14 ASSESSMENT — ANXIETY QUESTIONNAIRES
7. FEELING AFRAID AS IF SOMETHING AWFUL MIGHT HAPPEN: NOT AT ALL
5. BEING SO RESTLESS THAT IT IS HARD TO SIT STILL: SEVERAL DAYS
3. WORRYING TOO MUCH ABOUT DIFFERENT THINGS: MORE THAN HALF THE DAYS
GAD7 TOTAL SCORE: 10
2. NOT BEING ABLE TO STOP OR CONTROL WORRYING: MORE THAN HALF THE DAYS
6. BECOMING EASILY ANNOYED OR IRRITABLE: NOT AT ALL
1. FEELING NERVOUS, ANXIOUS, OR ON EDGE: MORE THAN HALF THE DAYS

## 2018-12-14 ASSESSMENT — MIFFLIN-ST. JEOR: SCORE: 1207.3

## 2018-12-14 ASSESSMENT — PATIENT HEALTH QUESTIONNAIRE - PHQ9: 5. POOR APPETITE OR OVEREATING: NEARLY EVERY DAY

## 2018-12-14 NOTE — PROGRESS NOTES
"Katie is a 58 year old female  that presents today for insomnia.    HPI:  She reports she has been having trouble sleeping since her total hysterectomy + BSO in . Reports the problem is mostly waking during the night, not falling asleep. Has recently had a few nights where she got 3 hours of sleep. Reports significant life stressors working 2 jobs, recently quit 1 and has felt some relief. Using acupuncture and 1 mg melatonin every few days, which help. Does not wish to start sleep aids.     Reports insomnia trigger anxiety, vaginal dryness, and hot flushes. Denies decreased libido or sexual discomfort.    Also requests a breast exam today. Denies any lumps, skin changes, bleeding, or nipple discharge.      ROS:  As per HPI  PRECIOUS-7 = 11  PHQ-9 = 13, denies any current suicidal ideation or plan    PROBLEM LIST:  Patient Active Problem List   Diagnosis     Mild intermittent asthma     Lipoma of axilla -- left     Cyst of breast, right, solitary     Stiffness of joint, hand     Anemia     S/P total hysterectomy and bilateral salpingo-oophorectomy     Fibrocystic breast changes     Other anterior corneal dystrophies       OB/GYN HISTORY:   Obstetric History       T2      L2     SAB2   TAB0   Ectopic1   Multiple0   Live Births2       # Outcome Date GA Lbr Lenard/2nd Weight Sex Delivery Anes PTL Lv   5 Term 10/26/97 38w0d 21:00 3.7 kg (8 lb 2.5 oz) M CS-LTranv   LIVE BIRTH      Name: Christopher   4 Term 92 37w0d 26:00 3.6 kg (7 lb 15 oz) F CS-LTranv   LIVE BIRTH      Name: Keren   3 Ectopic            2 SAB            1 SAB               Sexually active with male partner, monogamous relationship. Denies concern for STI, declines testing today    PAST MEDICAL HISTORY:  Past Medical History:   Diagnosis Date     Abdominal pain 2017    ct with long segment of colits descending colon, then colonoscopy and bx by Dr. Dailey and nl     Abnormal stools 2013     Anemia     \"Related to " "heavy periods\"     Breast disorder ,     Pain, lump     Complex endometrial hyperplasia with atypia 10/17/2013    Path dx w complete hysterectomy.      H/O colonoscopy 2017    nl, bx nl     Kidney cysts     left seen on CT     Macular degeneration     Taking vitamin.     Menarche     cycles q 28 x 5 d     Menorrhagia 2007    Oct 2009 ->anemia to 7+ gms.  Resolved with OTC po FE 50 mg/d      Postpartum depression     Only for a few days     Precancerous skin lesion     excised -- abdomen. ->yrly Derm cks     S/P total hysterectomy and bilateral salpingo-oophorectomy 10/17/2013    Path: -->Complex endometrial hyperplasia with atypia.      SOB (shortness of breath) 2017    also elevated d dimer - ct no pe, some fibrosis or atelect, breast cysts, then est echo nl      Swelling of the ankle, feet, or leg     Ankles     Uncomplicated asthma     Childhood. Currently have sensitive airways.     Unspecified hemorrhoids without mention of complication     bleeding to anemia -- hgb 8.4 and lower....     Uterine leiomyoma 2009    [place] in [place] 8/[place]/2011 US in this EMR confirms multiple myomas with a 12 mm endometrial stripe.  (Problem list name updated by automated process. Provider to review and confirm*       Life Style Modifiers:   Tobacco:  reports that  has never smoked. she has never used smokeless tobacco.   Alcohol:  reports that she drinks alcohol.   Drug use:  reports that she does not use drugs.  Exercise:                    Diet:   CAM Providers:      Acupuncture: Yes    PAST SURGICAL HISTORY:  Past Surgical History:   Procedure Laterality Date     APPENDECTOMY OPEN  age 3     BIOPSY      uterine, skin     C LAP,SURG,COLECTOMY, PARTIAL, W/ANAST  age 3    tumor in colon as child (removed tumor and appendix)      SECTION   and      COLONOSCOPY      virtual     COLONOSCOPY  2013    Procedure: COLONOSCOPY;  COLONOSCOPY;  Surgeon: " Db Reed MD;  Location:  GI     COLONOSCOPY N/A 3/14/2017    Procedure: COMBINED COLONOSCOPY, SINGLE OR MULTIPLE BIOPSY/POLYPECTOMY BY BIOPSY;  Surgeon: Db Reed MD;  Location:  GI     DAVINCI HYSTERECTOMY TOTAL, BILATERAL SALPINGO-OOPHORECTOMY, COMBINED  10/17/2013    Procedure: COMBINED DAVINCI HYSTERECTOMY TOTAL, SALPINGO-OOPHORECTOMY;  DaVinci Assisted Total Laparoscopic Hysterectomy/Bilateral Salpingo Oophorectomy Cystoscopy  ;  Surgeon: Zoya Vance MD;  Location: [facility]     DILATION AND CURETTAGE, OPERATIVE HYSTEROSCOPY WITH MORCELLATOR, COMBINED  2013    Procedure: COMBINED DILATION AND CURETTAGE, OPERATIVE HYSTEROSCOPY WITH MORCELLATOR;  Dilation and Curettage, Hysteroscopy, Hysteroscopic  Polypectomy with Myosure ;  Surgeon: Concepción Gamboa MD;  Location: [facility]     LAPAROSCOPY DIAGNOSTIC (GYN)      ectopic excision       FAMILY HISTORY:  Family History   Problem Relation Age of Onset     Diabetes Father 75        DMII     Hypertension Father 65     Other Cancer Father         Carcinoma     Cerebrovascular Disease Father      Hypertension Mother 45     Eye Disorder Mother         macular degeneration     Psychotic Disorder Mother         Borderline Personality Disorder ([id number])     Anxiety Disorder Mother      Mental Illness Mother      Breast Cancer Paternal Grandmother 45         age 55     Cancer Maternal Grandmother 60        uterine/breast     Uterine Cancer Maternal Grandmother      Cancer Maternal Aunt 60        ovarian     Cancer - colorectal Maternal Aunt         possible     Breast Cancer Other      Uterine Cancer Other      Mental Illness Other      Asthma Brother        SOCIAL HISTORY:  Social History     Socioeconomic History     Marital status:      Spouse name: Not on file     Number of children: 2     Years of education: Not on file     Highest education level: Not on file   Social Needs     Financial resource strain: Not on  file     Food insecurity - worry: Not on file     Food insecurity - inability: Not on file     Transportation needs - medical: Not on file     Transportation needs - non-medical: Not on file   Occupational History     Occupation: Faculty     Employer: [employer]     Comment: [employer]   Tobacco Use     Smoking status: Never Smoker     Smokeless tobacco: Never Used   Substance and Sexual Activity     Alcohol use: Yes     Comment: 1/2 d / mo     Drug use: No     Sexual activity: Yes     Partners: Male     Birth control/protection: Post-menopausal, Female Surgical     Comment: Vastectomy   Other Topics Concern      Service Not Asked     Blood Transfusions No     Caffeine Concern No     Comment: Stopped all caffiene 2nd to breast pain     Occupational Exposure No     Hobby Hazards No     Sleep Concern No     Stress Concern No     Weight Concern No     Special Diet No     Back Care No     Exercise No     Comment: 45' aerobic 3xs/wk     Bike Helmet No     Seat Belt No     Self-Exams Not Asked     Parent/sibling w/ CABG, MI or angioplasty before 65F 55M? Not Asked   Social History Narrative     Not on file       MEDICATIONS:  Current Outpatient Medications   Medication Sig Dispense Refill     Ascorbic Acid (VITAMIN C CR PO) Take 1 tablet by mouth daily.       calcium carbonate (OS-MINERVA 500 MG [place]. [place]) 1250 MG tablet Take 1 tablet by mouth 2 times daily       Coenzyme Q10 (CO Q-10 PO) Take 1 capsule by mouth daily       Krill Oil 1000 MG CAPS        LUTEIN PO        magnesium 100 MG CAPS Take by mouth daily       Multiple Vitamins-Minerals (VITEYES AREDS FORMULA/LUTEIN) CAPS Take 1 tablet by mouth daily       VITAMIN A PO Take 10,000 Units by mouth daily       vitamin B complex with vitamin C (VITAMIN  B COMPLEX) TABS tablet Take 1 tablet by mouth daily       VITAMIN D, CHOLECALCIFEROL, PO Take by mouth daily       VITAMIN E COMPLEX PO          ALLERGIES:  Hydrocodone; Penicillins; Doxycycline; and  "Hibiclens    VITALS:  Blood pressure 106/69, pulse 71, height 1.626 m (5' 4\"), weight 63.7 kg (140 lb 8 oz), last menstrual period 08/20/2013, not currently breastfeeding.    PHYSICAL EXAM:  Constitutional: Well appearing woman in no acute distress.  Psychological: anxious mood with appropriate affect.  Eyes: anicteric  Neck: Neck supple. No thyroidmegaly, thyroid tenderness, or palpable nodules  Cardiovascular: regular rate and rhythm, normal S1 and S2, no murmurs, rubs or gallops  Respiratory: non-labored respirations. clear to auscultation, no wheezes or crackles, normal breath sounds.   Breast: Symmetrical without visible distortion or swelling. No masses noted. No nipple inversion, no breast dimpling or puckering. Papule in left axilla resembling open comedone with surrounding erythema. Axillary area without masses or lympadenapathy.   Gastrointestinal: no abdominal protrusion  Skin: many brown macules and papules, resembling nevi. no concerning lesions, no jaundice.    Diagnoses and associated orders for this visit:  Other insomnia  -     Integrative Care  Referral - Acupuncture and Chinese Medicine    Other orders  -     LUTEIN PO;   -     Krill Oil 1000 MG CAPS;      1. Insomnia  - Likely due to a variety of interrelated factors to anxiety/depression and surgical menopause.   - Discussed proper sleep hygiene. Encouraged no screens within 2 hours of bed, only using bed for sleep or sexual intercourse, getting up if not able to fall back asleep within 20-40 minutes  - Continue Melatonin, as needed  - Referral to acupuncturist  - Discussed that initiation of serotonin specific reuptake inhibitor may be helpful for a variety of her symptoms, including depression, anxiety, and hot flushes. Patient does not desire initiation at this time. Also discussed possibility of counseling. Patient understands she can call at any time if she would like to begin treatment with either of these modalities    Health Maintenance " reviewed:    Total hysterectomy + BSO in 2013 without any pap smears showing cervical cancer. No longer needs paps, per current guidelines    Mammogram 2017 negative, f/u in 1 year    Colonoscopy 2017 with h/o IBS, f/u in 5 years    BMP, CBC, TSH, vitamin D levels checked 2017     Lipid panel 2016 without abnormality, recheck in 3 years           I, Gregoria Nevarez MS-4, am acting as scribe for Dr. Chelsi Hay MD.      I was present with the medical student who participated in the service and in the  documentation of the note. I have verified the history and personally performed the  physical exam and medical decision making. I agree with the assessment and plan of  care as documented in the note.    Chelsi Hay

## 2018-12-14 NOTE — LETTER
2018     RE: Katie Hernandez  [address]  [facility] [address]     Dear Colleague,    Thank you for referring your patient, Katie Hernandez, to the [facility] at [facility]. Please see a copy of my visit note below.    Katie is a 58 year old female  that presents today for insomnia.    HPI:  She reports she has been having trouble sleeping since her total hysterectomy + BSO in . Reports the problem is mostly waking during the night, not falling asleep. Has recently had a few nights where she got 3 hours of sleep. Reports significant life stressors working 2 jobs, recently quit 1 and has felt some relief. Using acupuncture and 1 mg melatonin every few days, which help. Does not wish to start sleep aids.     Reports insomnia trigger anxiety, vaginal dryness, and hot flushes. Denies decreased libido or sexual discomfort.    Also requests a breast exam today. Denies any lumps, skin changes, bleeding, or nipple discharge.      ROS:  As per HPI  PRECIOUS-7 = 11  PHQ-9 = 13, denies any current suicidal ideation or plan    PROBLEM LIST:  Patient Active Problem List   Diagnosis     Mild intermittent asthma     Lipoma of axilla -- left     Cyst of breast, right, solitary     Stiffness of joint, hand     Anemia     S/P total hysterectomy and bilateral salpingo-oophorectomy     Fibrocystic breast changes     Other anterior corneal dystrophies       OB/GYN HISTORY:   Obstetric History       T2      L2     SAB2   TAB0   Ectopic1   Multiple0   Live Births2       # Outcome Date GA Lbr Lenard/2nd Weight Sex Delivery Anes PTL Lv   5 Term 10/26/97 38w0d 21:00 3.7 kg (8 lb 2.5 oz) M CS-LTranv   LIVE BIRTH      Name: Christopher   4 Term 92 37w0d 26:00 3.6 kg (7 lb 15 oz) F CS-LTranv   LIVE BIRTH      Name: Keren   3 Ectopic 1991           2 SAB            1 SAB               Sexually active with male partner, monogamous  "relationship. Denies concern for STI, declines testing today    PAST MEDICAL HISTORY:  Past Medical History:   Diagnosis Date     Abdominal pain 03/2017    ct with long segment of colits descending colon, then colonoscopy and bx by Dr. Dailey and nl     Abnormal stools 5/30/2013     Anemia     \"Related to heavy periods\"     Breast disorder 2014, 2017    Pain, lump     Complex endometrial hyperplasia with atypia 10/17/2013    Path dx w complete hysterectomy.      H/O colonoscopy 03/2017    nl, bx nl     Kidney cysts     left seen on CT     Macular degeneration 2015    Taking vitamin.     Menarche     cycles q 28 x 5 d     Menorrhagia 5/16/2007    Oct 2009 ->anemia to 7+ gms.  Resolved with OTC po FE 50 mg/d      Postpartum depression     Only for a few days     Precancerous skin lesion     excised -- abdomen. ->yrly Derm cks     S/P total hysterectomy and bilateral salpingo-oophorectomy 10/17/2013    Path: -->Complex endometrial hyperplasia with atypia.      SOB (shortness of breath) 04/2017    also elevated d dimer - ct no pe, some fibrosis or atelect, breast cysts, then est echo nl 4/17     Swelling of the ankle, feet, or leg     Ankles     Uncomplicated asthma     Childhood. Currently have sensitive airways.     Unspecified hemorrhoids without mention of complication 2007    bleeding to anemia -- hgb 8.4 and lower....     Uterine leiomyoma 5/13/2009    [place] diagnosis while in [place] 8/10/2011 US in this EMR confirms multiple myomas with a 12 mm endometrial stripe.  (Problem list name updated by automated process. Provider to review and confirm*       Life Style Modifiers:   Tobacco:  reports that  has never smoked. she has never used smokeless tobacco.   Alcohol:  reports that she drinks alcohol.   Drug use:  reports that she does not use drugs.  Exercise:                    Diet:   CAM Providers:      Acupuncture: Yes    PAST SURGICAL HISTORY:  Past Surgical History:   Procedure Laterality Date     APPENDECTOMY " OPEN  age 3     BIOPSY      uterine, skin     C LAP,SURG,COLECTOMY, PARTIAL, W/ANAST  age 3    tumor in colon as child (removed tumor and appendix)      SECTION   and      COLONOSCOPY      virtual     COLONOSCOPY  2013    Procedure: COLONOSCOPY;  COLONOSCOPY;  Surgeon: Db Reed MD;  Location:  GI     COLONOSCOPY N/A 3/14/2017    Procedure: COMBINED COLONOSCOPY, SINGLE OR MULTIPLE BIOPSY/POLYPECTOMY BY BIOPSY;  Surgeon: Db Reed MD;  Location:  GI     DAVINCI HYSTERECTOMY TOTAL, BILATERAL SALPINGO-OOPHORECTOMY, COMBINED  10/17/2013    Procedure: COMBINED DAVINCI HYSTERECTOMY TOTAL, SALPINGO-OOPHORECTOMY;  DaVinci Assisted Total Laparoscopic Hysterectomy/Bilateral Salpingo Oophorectomy Cystoscopy  ;  Surgeon: Zoya Vance MD;  Location: [facility]     DILATION AND CURETTAGE, OPERATIVE HYSTEROSCOPY WITH MORCELLATOR, COMBINED  2013    Procedure: COMBINED DILATION AND CURETTAGE, OPERATIVE HYSTEROSCOPY WITH MORCELLATOR;  Dilation and Curettage, Hysteroscopy, Hysteroscopic  Polypectomy with Myosure ;  Surgeon: Concepción Gamboa MD;  Location: [facility]     LAPAROSCOPY DIAGNOSTIC (GYN)      ectopic excision       FAMILY HISTORY:  Family History   Problem Relation Age of Onset     Diabetes Father 75        DMII     Hypertension Father 65     Other Cancer Father         Carcinoma     Cerebrovascular Disease Father      Hypertension Mother 45     Eye Disorder Mother         macular degeneration     Psychotic Disorder Mother         Borderline Personality Disorder ([id number])     Anxiety Disorder Mother      Mental Illness Mother      Breast Cancer Paternal Grandmother 45         age 55     Cancer Maternal Grandmother 60        uterine/breast     Uterine Cancer Maternal Grandmother      Cancer Maternal Aunt 60        ovarian     Cancer - colorectal Maternal Aunt         possible     Breast Cancer Other      Uterine Cancer Other      Mental Illness Other       Asthma Brother        SOCIAL HISTORY:  Social History     Socioeconomic History     Marital status:      Spouse name: Not on file     Number of children: 2     Years of education: Not on file     Highest education level: Not on file   Social Needs     Financial resource strain: Not on file     Food insecurity - worry: Not on file     Food insecurity - inability: Not on file     Transportation needs - medical: Not on file     Transportation needs - non-medical: Not on file   Occupational History     Occupation: Faculty     Employer: [employer]     Comment: [employer]   Tobacco Use     Smoking status: Never Smoker     Smokeless tobacco: Never Used   Substance and Sexual Activity     Alcohol use: Yes     Comment: 1/2 d / mo     Drug use: No     Sexual activity: Yes     Partners: Male     Birth control/protection: Post-menopausal, Female Surgical     Comment: Vastectomy   Other Topics Concern      Service Not Asked     Blood Transfusions No     Caffeine Concern No     Comment: Stopped all caffiene 2nd to breast pain     Occupational Exposure No     Hobby Hazards No     Sleep Concern No     Stress Concern No     Weight Concern No     Special Diet No     Back Care No     Exercise No     Comment: 45' aerobic 3xs/wk     Bike Helmet No     Seat Belt No     Self-Exams Not Asked     Parent/sibling w/ CABG, MI or angioplasty before 65F 55M? Not Asked   Social History Narrative     Not on file       MEDICATIONS:  Current Outpatient Medications   Medication Sig Dispense Refill     Ascorbic Acid (VITAMIN C CR PO) Take 1 tablet by mouth daily.       calcium carbonate (OS-MINERVA 500 MG [place]. [place]) 1250 MG tablet Take 1 tablet by mouth 2 times daily       Coenzyme Q10 (CO Q-10 PO) Take 1 capsule by mouth daily       Krill Oil 1000 MG CAPS        LUTEIN PO        magnesium 100 MG CAPS Take by mouth daily       Multiple Vitamins-Minerals (VITEYES AREDS FORMULA/LUTEIN) CAPS Take 1 tablet by mouth daily       VITAMIN A  "PO Take 10,000 Units by mouth daily       vitamin B complex with vitamin C (VITAMIN  B COMPLEX) TABS tablet Take 1 tablet by mouth daily       VITAMIN D, CHOLECALCIFEROL, PO Take by mouth daily       VITAMIN E COMPLEX PO          ALLERGIES:  Hydrocodone; Penicillins; Doxycycline; and Hibiclens    VITALS:  Blood pressure 106/69, pulse 71, height 1.626 m (5' 4\"), weight 63.7 kg (140 lb 8 oz), last menstrual period 08/20/2013, not currently breastfeeding.    PHYSICAL EXAM:  Constitutional: Well appearing woman in no acute distress.  Psychological: anxious mood with appropriate affect.  Eyes: anicteric  Neck: Neck supple. No thyroidmegaly, thyroid tenderness, or palpable nodules  Cardiovascular: regular rate and rhythm, normal S1 and S2, no murmurs, rubs or gallops  Respiratory: non-labored respirations. clear to auscultation, no wheezes or crackles, normal breath sounds.   Breast: Symmetrical without visible distortion or swelling. No masses noted. No nipple inversion, no breast dimpling or puckering. Papule in left axilla resembling open comedone with surrounding erythema. Axillary area without masses or lympadenapathy.   Gastrointestinal: no abdominal protrusion  Skin: many brown macules and papules, resembling nevi. no concerning lesions, no jaundice.    Diagnoses and associated orders for this visit:  Other insomnia  -     Integrative Care  Referral - Acupuncture and Chinese Medicine    Other orders  -     LUTEIN PO;   -     Krill Oil 1000 MG CAPS;      1. Insomnia  - Likely due to a variety of interrelated factors to anxiety/depression and surgical menopause.   - Discussed proper sleep hygiene. Encouraged no screens within 2 hours of bed, only using bed for sleep or sexual intercourse, getting up if not able to fall back asleep within 20-40 minutes  - Continue Melatonin, as needed  - Referral to acupuncturist  - Discussed that initiation of serotonin specific reuptake inhibitor may be helpful for a variety of her " symptoms, including depression, anxiety, and hot flushes. Patient does not desire initiation at this time. Also discussed possibility of counseling. Patient understands she can call at any time if she would like to begin treatment with either of these modalities    Health Maintenance reviewed:    Total hysterectomy + BSO in 2013 without any pap smears showing cervical cancer. No longer needs paps, per current guidelines    Mammogram 2017 negative, f/u in 1 year    Colonoscopy 2017 with h/o IBS, f/u in 5 years    BMP, CBC, TSH, vitamin D levels checked 2017     Lipid panel 2016 without abnormality, recheck in 3 years           I, Gregoria Nevarez MS-4, am acting as scribe for Dr. Chelsi Hay MD.    I was present with the medical student who participated in the service and in the  documentation of the note. I have verified the history and personally performed the  physical exam and medical decision making. I agree with the assessment and plan of  care as documented in the note.    Chelsi Hay

## 2018-12-15 ASSESSMENT — ANXIETY QUESTIONNAIRES: GAD7 TOTAL SCORE: 10

## 2018-12-21 ENCOUNTER — OFFICE VISIT (OUTPATIENT)
Dept: OPHTHALMOLOGY | Facility: CLINIC | Age: 58
End: 2018-12-21
Payer: COMMERCIAL

## 2018-12-21 DIAGNOSIS — H35.30 MACULAR DEGENERATION OF BOTH EYES, UNSPECIFIED TYPE: ICD-10-CM

## 2018-12-21 DIAGNOSIS — H18.529 ABMD (ANTERIOR BASEMENT MEMBRANE DYSTROPHY): ICD-10-CM

## 2018-12-21 DIAGNOSIS — H52.4 PRESBYOPIA OF BOTH EYES: Primary | ICD-10-CM

## 2018-12-21 ASSESSMENT — VISUAL ACUITY
OS_SC: 20/20
OD_SC+: -2
METHOD: SNELLEN - LINEAR
METHOD_MR: DECLINED
OD_SC: 20/25

## 2018-12-21 ASSESSMENT — SLIT LAMP EXAM - LIDS
COMMENTS: NORMAL
COMMENTS: NORMAL

## 2018-12-21 ASSESSMENT — CUP TO DISC RATIO
OS_RATIO: 0.3
OD_RATIO: 0.3

## 2018-12-21 ASSESSMENT — TONOMETRY
IOP_METHOD: ICARE
OD_IOP_MMHG: 19
OS_IOP_MMHG: 17

## 2018-12-21 ASSESSMENT — CONF VISUAL FIELD
OD_NORMAL: 1
OS_NORMAL: 1

## 2018-12-21 ASSESSMENT — REFRACTION_WEARINGRX
OS_SPHERE: +2.50
OD_SPHERE: +2.50

## 2018-12-21 ASSESSMENT — EXTERNAL EXAM - RIGHT EYE: OD_EXAM: NORMAL

## 2018-12-21 ASSESSMENT — EXTERNAL EXAM - LEFT EYE: OS_EXAM: NORMAL

## 2018-12-21 NOTE — PROGRESS NOTES
HPI  Katie Hernandez is a 58 year old female here for comprehensive eye exam.  Denies blurry vision, eye pain, or irritation. Feels dry eye syndrome is much improved from years ago and now only needing artificial tear drops as needed a few times per day or less. Happy using reading glasses only, no distance glasses.      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 KARYNA Francis, no surgery, no trauma, anterior basement membrane dystrophy (ABMD), dry eye syndrome   Oc Meds: none  FH: sister-glaucoma, mother and daughter with age related macular degeneration, no other known eye diseases         Assessment & Plan     (H52.4) Presbyopia of both eyes - Both Eyes  (primary encounter diagnosis)  Comment: stable   Plan: continue +2.50 for readers and lower add for computer    (H18.52) ABMD (anterior basement membrane dystrophy) - Both Eyes  Comment: previously symptoms related to concurrent dry eye syndrome, improved  Plan: continue artificial tear drops as needed     (H35.30) Macular degeneration of both eyes, unspecified type - Both Eyes  Comment: familial, follows with KARYNA Francis  Plan: continue follow-up q 6 months, call sooner with vision changes         -----------------------------------------------------------------------------------       Patient disposition:   Return in about 1 year (around 12/21/2019) for Comprehensive Exam. Patient to call sooner as needed.    Complete documentation of historical and exam elements from today's encounter can be found in the full encounter summary report (not reduplicated in this progress note). I personally obtained the chief complaint(s) and history of present illness.  I have confirmed and edited as necessary the CC, HPI, PMH/PSH, social history, FMH, ROS, and exam/neuro findings as obtained by the technician or others. I have examined this patient myself and I personally viewed the image(s) and  studies listed above and the documentation reflects my findings and interpretation.     Nilsa Horton

## 2019-02-15 ENCOUNTER — HEALTH MAINTENANCE LETTER (OUTPATIENT)
Age: 59
End: 2019-02-15

## 2019-08-14 ENCOUNTER — HOSPITAL ENCOUNTER (OUTPATIENT)
Dept: MAMMOGRAPHY | Facility: CLINIC | Age: 59
Discharge: HOME | End: 2019-08-14
Attending: OBSTETRICS & GYNECOLOGY | Admitting: OBSTETRICS & GYNECOLOGY
Payer: COMMERCIAL

## 2019-08-14 DIAGNOSIS — Z12.31 VISIT FOR SCREENING MAMMOGRAM: ICD-10-CM

## 2019-08-14 PROCEDURE — 77063 BREAST TOMOSYNTHESIS BI: CPT

## 2019-08-21 ENCOUNTER — TRANSFERRED RECORDS (OUTPATIENT)
Dept: HEALTH INFORMATION MANAGEMENT | Facility: CLINIC | Age: 59
End: 2019-08-21

## 2019-09-10 ENCOUNTER — OFFICE VISIT (OUTPATIENT)
Dept: FAMILY MEDICINE | Facility: CLINIC | Age: 59
End: 2019-09-10
Payer: COMMERCIAL

## 2019-09-10 VITALS
TEMPERATURE: 97.6 F | WEIGHT: 133 LBS | OXYGEN SATURATION: 98 % | HEART RATE: 78 BPM | BODY MASS INDEX: 22.83 KG/M2 | DIASTOLIC BLOOD PRESSURE: 68 MMHG | SYSTOLIC BLOOD PRESSURE: 107 MMHG

## 2019-09-10 DIAGNOSIS — R51.9 NONINTRACTABLE EPISODIC HEADACHE, UNSPECIFIED HEADACHE TYPE: ICD-10-CM

## 2019-09-10 DIAGNOSIS — Z00.00 ROUTINE GENERAL MEDICAL EXAMINATION AT A HEALTH CARE FACILITY: Primary | ICD-10-CM

## 2019-09-10 DIAGNOSIS — J45.20 MILD INTERMITTENT ASTHMA, UNSPECIFIED WHETHER COMPLICATED: ICD-10-CM

## 2019-09-10 LAB
ERYTHROCYTE [DISTWIDTH] IN BLOOD BY AUTOMATED COUNT: 13.5 % (ref 10–15)
ERYTHROCYTE [SEDIMENTATION RATE] IN BLOOD BY WESTERGREN METHOD: 7 MM/H (ref 0–30)
HCT VFR BLD AUTO: 40.5 % (ref 35–47)
HGB BLD-MCNC: 14.1 G/DL (ref 11.7–15.7)
MCH RBC QN AUTO: 31.8 PG (ref 26.5–33)
MCHC RBC AUTO-ENTMCNC: 34.8 G/DL (ref 31.5–36.5)
MCV RBC AUTO: 91 FL (ref 78–100)
PLATELET # BLD AUTO: 243 10E9/L (ref 150–450)
RBC # BLD AUTO: 4.44 10E12/L (ref 3.8–5.2)
WBC # BLD AUTO: 7.4 10E9/L (ref 4–11)

## 2019-09-10 PROCEDURE — 85652 RBC SED RATE AUTOMATED: CPT | Performed by: INTERNAL MEDICINE

## 2019-09-10 PROCEDURE — 80053 COMPREHEN METABOLIC PANEL: CPT | Performed by: INTERNAL MEDICINE

## 2019-09-10 PROCEDURE — 36415 COLL VENOUS BLD VENIPUNCTURE: CPT | Performed by: INTERNAL MEDICINE

## 2019-09-10 PROCEDURE — 83721 ASSAY OF BLOOD LIPOPROTEIN: CPT | Performed by: INTERNAL MEDICINE

## 2019-09-10 PROCEDURE — 80061 LIPID PANEL: CPT | Performed by: INTERNAL MEDICINE

## 2019-09-10 PROCEDURE — 87389 HIV-1 AG W/HIV-1&-2 AB AG IA: CPT | Performed by: INTERNAL MEDICINE

## 2019-09-10 PROCEDURE — 99396 PREV VISIT EST AGE 40-64: CPT | Performed by: INTERNAL MEDICINE

## 2019-09-10 PROCEDURE — 99213 OFFICE O/P EST LOW 20 MIN: CPT | Mod: 25 | Performed by: INTERNAL MEDICINE

## 2019-09-10 PROCEDURE — 85027 COMPLETE CBC AUTOMATED: CPT | Performed by: INTERNAL MEDICINE

## 2019-09-11 LAB
ALBUMIN SERPL-MCNC: 4.3 G/DL (ref 3.4–5)
ALP SERPL-CCNC: 85 U/L (ref 40–150)
ALT SERPL W P-5'-P-CCNC: 22 U/L (ref 0–50)
ANION GAP SERPL CALCULATED.3IONS-SCNC: 11 MMOL/L (ref 3–14)
AST SERPL W P-5'-P-CCNC: 28 U/L (ref 0–45)
BILIRUB SERPL-MCNC: 0.3 MG/DL (ref 0.2–1.3)
BUN SERPL-MCNC: 21 MG/DL (ref 7–30)
CALCIUM SERPL-MCNC: 8.6 MG/DL (ref 8.5–10.1)
CHLORIDE SERPL-SCNC: 103 MMOL/L (ref 94–109)
CO2 SERPL-SCNC: 24 MMOL/L (ref 20–32)
CREAT SERPL-MCNC: 0.64 MG/DL (ref 0.52–1.04)
GFR SERPL CREATININE-BSD FRML MDRD: >90 ML/MIN/{1.73_M2}
GLUCOSE SERPL-MCNC: 80 MG/DL (ref 70–99)
HIV 1+2 AB+HIV1 P24 AG SERPL QL IA: NONREACTIVE
POTASSIUM SERPL-SCNC: 4.7 MMOL/L (ref 3.4–5.3)
PROT SERPL-MCNC: 7.9 G/DL (ref 6.8–8.8)
SODIUM SERPL-SCNC: 138 MMOL/L (ref 133–144)

## 2019-09-12 LAB
CHOLEST SERPL-MCNC: 150 MG/DL
HDLC SERPL-MCNC: 62 MG/DL
LDLC SERPL CALC-MCNC: ABNORMAL MG/DL
LDLC SERPL DIRECT ASSAY-MCNC: 49 MG/DL
NONHDLC SERPL-MCNC: 89 MG/DL
TRIGL SERPL-MCNC: 560 MG/DL

## 2019-09-13 NOTE — RESULT ENCOUNTER NOTE
It was nice to see you for your physical.  You should be able to see you for your test results which look good.    I am happy to report that your CBC or complete blood count is normal with no signs of blood disorders.  Your chemistry panel shows no signs of diabetes.  Your blood salts, kidney tests, liver tests, proteins, and HIV test are all negative.  A test for inflammation called the sedimentation rate is also normal.    Your total cholesterol is super at 150.  Your HDL or good cholesterol is very good at 62 and your LDL or bad cholesterol is also very good at 49.  For some reason your triglycerides are markedly elevated this year.  They have never been like this before so I suspect it could be a lab variance or possibly something you ate the day before.  I am not worried but I would suggest that you come back to repeat the cholesterol after having not eaten or drinking for 10 hours.  You do not need to see me for this but please call before you come.    I am happy to bring you this excellent report.  If you have any questions let me know.    Mahesh Santoyo M.D.

## 2019-09-17 DIAGNOSIS — E78.1 HIGH TRIGLYCERIDES: ICD-10-CM

## 2019-09-17 LAB
CHOLEST SERPL-MCNC: 146 MG/DL
HDLC SERPL-MCNC: 83 MG/DL
LDLC SERPL CALC-MCNC: 52 MG/DL
NONHDLC SERPL-MCNC: 63 MG/DL
TRIGL SERPL-MCNC: 54 MG/DL

## 2019-09-17 PROCEDURE — 80061 LIPID PANEL: CPT | Performed by: INTERNAL MEDICINE

## 2019-09-17 PROCEDURE — 36415 COLL VENOUS BLD VENIPUNCTURE: CPT | Performed by: INTERNAL MEDICINE

## 2019-09-17 NOTE — RESULT ENCOUNTER NOTE
Great news, the cholesterol looks wonderful, not sure why the first was off.    If you have any questions please call me.    Mahesh Santoyo M.D.

## 2019-09-30 ENCOUNTER — HEALTH MAINTENANCE LETTER (OUTPATIENT)
Age: 59
End: 2019-09-30

## 2019-10-21 ENCOUNTER — NURSE TRIAGE (OUTPATIENT)
Dept: FAMILY MEDICINE | Facility: CLINIC | Age: 59
End: 2019-10-21

## 2019-10-21 NOTE — TELEPHONE ENCOUNTER
"1. LOCATION: \"Where does it hurt?\" 2 weeks ago had sciatic back pain (some pain in descending colon and bladder), after having a cold. Took motrin. Colon inflammation. Has colitis. Second episode started wed - didn't start with back pain, was all colon/abdominal pain (bladder and hip pain mainly), spread to back now. Today mainly back and hip    2. RADIATION: \"Does the pain shoot anywhere else?\" (e.g., chest, back) to back, previously to legs, now to hip     3. ONSET: \"When did the pain begin?\" (e.g., minutes, hours or days ago) better yesterday     4. SUDDEN: \"Gradual or sudden onset?\" gradually worsening     5. PATTERN \"Does the pain come and go, or is it constant?\" constant     6. SEVERITY: \"How bad is the pain?\" (e.g., Scale 1-10; mild, moderate, or severe) 7-8/10 now     7. RECURRENT SYMPTOM: \"Have you ever had this type of abdominal pain before?\" If so, ask: \"When was the last time?\" and \"What happened that time?\" never was in back in the past     8. CAUSE: \"What do you think is causing the abdominal pain?\" colitis? Gallbladder stones?     9. RELIEVING/AGGRAVATING FACTORS: \"What makes it better or worse?\" (e.g., movement, antacids, bowel movement) movement worsens - episode on Wed was after eating chocolate     10. OTHER SYMPTOMS: \"Has there been any vomiting, diarrhea, constipation, or urine problems?\" nausea, not vomiting. Constipation. Pressure on bladder - no bleeding concerns  Sometimes with pain feels lightheaded     Additional Information    Negative: Passed out (i.e., fainted, collapsed and was not responding)    Negative: Shock suspected (e.g., cold/pale/clammy skin, too weak to stand, low BP, rapid pulse)    Negative: Sounds like a life-threatening emergency to the triager    Negative: Chest pain    Negative: Pain is mainly in upper abdomen (if needed ask: 'is it mainly above the belly button?')    Negative: Abdominal pain and pregnant > 20 weeks    Negative: Abdominal pain and pregnant < 20 weeks    " Constant abdominal pain lasting > 2 hours    Protocols used: ABDOMINAL PAIN - FEMALE-A-OH    Triaged per Epic Triage Protocol, gave care advice which patient plans to follow.  See Care advice tab for more information.  Patent to call back if further questions or concerns.    Pt advised to go to UC right now, ER if additional symptoms or pain becomes severe. Pt said she cannot leave work but agreed to UC here tonight. Advised she leave work and go to ER if any new/worsening sx.     Glenny BATISTA RN

## 2019-10-21 NOTE — TELEPHONE ENCOUNTER
Reason for call:  Patient reporting a symptom    Symptom or request: Abdominal and back pain    Duration (how long have symptoms been present): couple of weeks    Have you been treated for this before? No    Additional comments: transferred to triage    Phone Number patient can be reached at:  Home number on file [phone] (home)    Best Time:  any    Can we leave a detailed message on this number:  YES    Call taken on 10/21/2019 at 12:21 PM by Ruthann Sosa

## 2019-10-23 ENCOUNTER — TRANSFERRED RECORDS (OUTPATIENT)
Dept: HEALTH INFORMATION MANAGEMENT | Facility: CLINIC | Age: 59
End: 2019-10-23

## 2020-02-04 ENCOUNTER — TRANSFERRED RECORDS (OUTPATIENT)
Dept: HEALTH INFORMATION MANAGEMENT | Facility: CLINIC | Age: 60
End: 2020-02-04

## 2020-03-24 ENCOUNTER — NURSE TRIAGE (OUTPATIENT)
Dept: NURSING | Facility: CLINIC | Age: 60
End: 2020-03-24

## 2020-03-24 NOTE — TELEPHONE ENCOUNTER
"\"I think I have shingles. I am getting much better. My rash started on Friday 3/20. I also had a mild fever and chill, the pain was a little worse, but now that's better. It was on my back toward my waist line, there were little blisters, it looks better. I am using a cream on them. My second concern is I used a new eye cream this am and now I have hives around both eyes. I took Benadryl (1) about 3 hrs ago and I took Zyrtec(1) one hour ago, I also have an ice pack on them. \" Denies other sx. Patient denies other allergic reactions. Advised this could be shingles and if on eyes or tip of nose to go to ER. \"I am sure it's a reaction to the eye cream. \" Gave home care advice and if sx worsen to go to ED as needed. Patient understands and agrees with plan of care. Call back if needed.  Merline Montgomery RN [facility]        Additional Information    Negative: Patient sounds very sick or weak to the triager    Negative: [1] Shingles rash (matches SYMPTOMS) AND [2] weak immune system (e.g., HIV positive,  cancer chemotherapy, chronic steroid treatment, splenectomy) AND [3] NOT taking antiviral medication    Negative: Shingles rash on the eyelid or tip of the nose    Negative: [1] Shingles rash of face AND [2] eye pain or blurred vision    Negative: [1] Shingles rash of face AND [2] facial weakness    Negative: [1] Shingles rash of face or ear AND [2] earache or ringing in the ear    Negative: [1] Shingles rash AND [2] spots start appearing other places on body    Negative: Fever > 100.5 F (38.1 C)    Negative: SEVERE pain (e.g., excruciating)    Negative: [1] Shingles rash (matches SYMPTOMS) AND [2] onset within past 72 hours    Negative: [1] Shingles rash AND [2] onset > 72 hours ago    Negative: [1] Looks infected (spreading redness, pus) AND [2] no fever    Negative: [1] Shingle rash already diagnosed AND [2] weak immune system (e.g., HIV positive,  cancer chemotherapy, chronic steroid treatment, " splenectomy) AND [3]  taking antiviral medication    Negative: Pain persisting > 1 month after rash disappears    Negative: [1] Shingles rash already diagnosed and [2] taking antiviral medication    Shingles, questions about    Protocols used: SHINGLES-A-AH

## 2020-03-25 ENCOUNTER — TELEPHONE (OUTPATIENT)
Dept: OPHTHALMOLOGY | Facility: CLINIC | Age: 60
End: 2020-03-25

## 2020-03-25 NOTE — TELEPHONE ENCOUNTER
[facility] Call Center    Phone Message    May a detailed message be left on voicemail: yes     Reason for Call: Symptoms or Concerns     If patient has red-flag symptoms, warm transfer to triage line    Current symptom or concern: eyelids are swollen and painful and dry - had hives yest - thinks allergic reaction so taking allergy medicine - but wants something else prescribed    Symptoms have been present for:  1 day(s)    Has patient previously been seen for this? No    By : Pt of Dr Horton    Date: NA    Are there any new or worsening symptoms? Yes: New      Action Taken: Message routed to:  [facility] EYE    Travel Screening: Not Applicable

## 2020-03-26 NOTE — TELEPHONE ENCOUNTER
I left a message for the patient who notes that she has eyelid pain.  I asked her to try warm compresses and artificial tears.    She may call us or send Mobile[employer] message if not resolved.

## 2020-03-31 ENCOUNTER — NURSE TRIAGE (OUTPATIENT)
Dept: NURSING | Facility: CLINIC | Age: 60
End: 2020-03-31

## 2020-03-31 NOTE — TELEPHONE ENCOUNTER
"Pt called the nurse advisor line on 3/24/2020. Pt suspect she has shingles.   The symptoms began on 3/20/2020.   She is using calmoseptime.      After further review it was determined that pt has not f/u with her primary care provider about this and has not been diagnosed with shingles.     She is aware that if this is shingles that it is contagious.       The areas is not growing. It is on her back about 5\" long.  Now She had 3 days of pain w/out rash.     Writer offered to transfer her to our schedulers to get her set up with a virtual visit with her primary care provider.  She declined.  She said she would mychart him right away..    Additional Information    Negative: Sounds like a life-threatening emergency to the triager    Negative: Fever and localized purple or blood-colored spots or dots that are not from injury or friction    Negative: Fever and localized rash is very painful    Negative: Patient sounds very sick or weak to the triager    Negative: Looks like a boil, infected sore, deep ulcer, or other infected rash (spreading redness, pus)    Negative: Patient wants to be seen    Negative: Applying cream or ointment and it causes severe itch, burning, or pain    Localized rash present > 7 days    Protocols used: RASH OR REDNESS - [id number]    catracho bobby RN on 3/31/2020 at 4:10 PM      "

## 2020-04-02 ENCOUNTER — VIRTUAL VISIT (OUTPATIENT)
Dept: FAMILY MEDICINE | Facility: CLINIC | Age: 60
End: 2020-04-02
Payer: COMMERCIAL

## 2020-04-02 DIAGNOSIS — B02.9 HERPES ZOSTER WITHOUT COMPLICATION: Primary | ICD-10-CM

## 2020-04-02 PROCEDURE — 99212 OFFICE O/P EST SF 10 MIN: CPT | Mod: TEL | Performed by: INTERNAL MEDICINE

## 2020-04-02 RX ORDER — UBIDECARENONE 100 MG
CAPSULE ORAL DAILY
COMMUNITY

## 2020-04-02 NOTE — PROGRESS NOTES
"Katie Hernandez is a 60 year old female who is being evaluated via a telephone visit.      The patient has been notified of following (by HORACIO Kruger CMA       \"We have found that certain health care needs can be provided without the need for a physical exam.  This service lets us provide the care you need with a short phone conversation.  If a prescription is necessary we can send it directly to your pharmacy.  If lab work is needed we can place an order for that and you can then stop by our lab to have the test done at a later time.    This telephone visit will be conducted via 3 way call with the you (the patient) , the physician/provider, and a me all on the line at the same time.  This allows your physician/provider to have the phone conversation with you while I will be taking notes for your medical record.  We will have full access to your [facility] medical record during this entire phone call.    Since this is like an office visit,  will bill your insurance company for this service.  Please check with your medical insurance if this type of telephone/virtual is covered . You may be responsible for the cost of this service if insurance coverage is denied.  The typical cost is $30 (10min), $59(11-20min) and $85 (21-30min)     If during the course of the call the physician/provider feels a telephone visit is not appropriate, you will not be charged for this service\"    Consent has been obtained for this service by care team member: yes.  See the scanned image in the medical record.      Shingles for 2 weeks, small area, had pain before the rash, using otc creams, pain not severe, is more tired then normal.  Did not have it looked at by md.  Using supplements, which I did not rec.  Overall is much better.  I did review pic she sent.  Not in other spots.  No fever now, not ill, no n/v.    For now I rec no treatment needed, call if worsens, not gone over next 2 weeks. I discussed with patient shingles " and phn.    Mahesh Santoyo M.D.    Time 10:35

## 2020-05-05 ENCOUNTER — MYC MEDICAL ADVICE (OUTPATIENT)
Dept: FAMILY MEDICINE | Facility: CLINIC | Age: 60
End: 2020-05-05

## 2020-05-05 NOTE — TELEPHONE ENCOUNTER
Panel Management Review      Patient has the following on her problem list: None      Composite cancer screening  Chart review shows that this patient is due/due soon for the following None  Summary:    Patient is due/failing the following:   Flu shot and ACT    Action needed:   Declined flu shot, sent act via my chart    Type of outreach:    none    Questions for provider review:    None                                                                                                                                    Fe Kruger CMA       Chart routed to Care Team .

## 2020-07-02 ENCOUNTER — VIRTUAL VISIT (OUTPATIENT)
Dept: FAMILY MEDICINE | Facility: CLINIC | Age: 60
End: 2020-07-02
Payer: COMMERCIAL

## 2020-07-02 DIAGNOSIS — S62.113D: Primary | ICD-10-CM

## 2020-07-02 DIAGNOSIS — S09.90XD CLOSED HEAD INJURY, SUBSEQUENT ENCOUNTER: ICD-10-CM

## 2020-07-02 DIAGNOSIS — N63.0 BREAST MASS: ICD-10-CM

## 2020-07-02 DIAGNOSIS — S22.42XD: ICD-10-CM

## 2020-07-02 PROCEDURE — 99213 OFFICE O/P EST LOW 20 MIN: CPT | Mod: 95 | Performed by: INTERNAL MEDICINE

## 2020-07-02 RX ORDER — LYSINE HCL 500 MG
500 TABLET ORAL DAILY
COMMUNITY
End: 2021-03-30

## 2020-07-02 RX ORDER — BETA-GLUCAN, (1-3) (1-4) 70 %
POWDER (GRAM) MISCELLANEOUS
COMMUNITY
End: 2021-03-30
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
## 2020-07-06 DIAGNOSIS — N63.0 BREAST NODULE: Primary | ICD-10-CM

## 2020-07-06 NOTE — PROGRESS NOTES
Incidental finding of bilateral breast nodules on imaging at [facility]. Recommend diagnostic mammo. Order placed and pt advised to call breast center to schedule

## 2020-07-09 DIAGNOSIS — N63.0 BREAST NODULE: Primary | ICD-10-CM

## 2020-07-10 ENCOUNTER — HOSPITAL ENCOUNTER (OUTPATIENT)
Dept: MAMMOGRAPHY | Facility: CLINIC | Age: 60
End: 2020-07-10
Attending: OBSTETRICS & GYNECOLOGY
Payer: COMMERCIAL

## 2020-07-10 DIAGNOSIS — N63.0 BREAST NODULE: ICD-10-CM

## 2020-07-10 PROCEDURE — 76642 ULTRASOUND BREAST LIMITED: CPT | Mod: 50

## 2020-07-10 PROCEDURE — 77066 DX MAMMO INCL CAD BI: CPT

## 2020-08-03 ENCOUNTER — TELEPHONE (OUTPATIENT)
Dept: CARDIOLOGY | Facility: CLINIC | Age: 60
End: 2020-08-03

## 2020-08-04 ENCOUNTER — HOSPITAL ENCOUNTER (OUTPATIENT)
Dept: CARDIOLOGY | Facility: CLINIC | Age: 60
Discharge: HOME | End: 2020-08-04
Attending: INTERNAL MEDICINE | Admitting: INTERNAL MEDICINE
Payer: COMMERCIAL

## 2020-08-04 ENCOUNTER — TELEPHONE (OUTPATIENT)
Dept: CARDIOLOGY | Facility: CLINIC | Age: 60
End: 2020-08-04

## 2020-08-04 DIAGNOSIS — R94.31 ABNORMAL ELECTROCARDIOGRAM: ICD-10-CM

## 2020-08-04 PROCEDURE — 93306 TTE W/DOPPLER COMPLETE: CPT | Mod: 26 | Performed by: INTERNAL MEDICINE

## 2020-08-04 PROCEDURE — 93306 TTE W/DOPPLER COMPLETE: CPT

## 2020-08-04 NOTE — TELEPHONE ENCOUNTER
PATIENT WELLNESS TELEPHONE SCREENING     Step 1 Screening Questions    In the past 3 weeks, have you been exposed to someone with a suspected or known illness?  COVID-19? No  Chickenpox? No   Measles? No  Pertussis? No    In the past 2 weeks, have you had any of the following symptoms?   Fever/Chills? No   Cough? No   Shortness of breath? No   New loss of taste or smell? No  Sore throat? No  Muscle or body aches? No  Headaches? No  Fatigue? No  Vomiting or diarrhea? No    Step 2 Screening Results (Skip if the patient is negative for symptoms)    If the patient is positive for new or worsening symptoms, contact the ordering provider to determine if the procedure is deemed necessary. Determine if patient can be re-scheduled when the patient is symptom free or has a negative COVID test.     If ordering provider deems the procedure is necessary, notify your manager/supervisor. Provide the patient with the procedural department phone number and inform the patient to call the procedural department upon arrival.  The patient will be registered over the phone.    Step 3 Review Visitor Policy  Patient informed of the updated visitor policy   1 visitor allowed per patient   Visitor must screen negative for COVID symptoms   Visitor must wear a mask    Cee Gibbons

## 2020-08-07 NOTE — RESULT ENCOUNTER NOTE
Great news, your heart ultrasound is entirely normal, no old heart attacks or any problems.  Bottom line is that the ekg does not indicate any problem.    If you have any questions please call me.    Mahesh Santoyo M.D.

## 2020-08-14 ENCOUNTER — TELEPHONE (OUTPATIENT)
Dept: FAMILY MEDICINE | Facility: CLINIC | Age: 60
End: 2020-08-14

## 2020-08-14 NOTE — TELEPHONE ENCOUNTER
FYI - Status Update    Who is Calling: Patient:  Patient had a EKG which lead to a Echo which the insurance company needs more justification for this test to get it paid for .    Does caller want a call back: Yes    Okay to leave a detailed message?:  Yes at Cell number on file:    Telephone Information:   Mobile [phone]

## 2021-01-15 ENCOUNTER — HEALTH MAINTENANCE LETTER (OUTPATIENT)
Age: 61
End: 2021-01-15

## 2021-03-30 ENCOUNTER — OFFICE VISIT (OUTPATIENT)
Dept: OBGYN | Facility: CLINIC | Age: 61
End: 2021-03-30
Attending: NURSE PRACTITIONER
Payer: COMMERCIAL

## 2021-03-30 VITALS
HEIGHT: 64 IN | SYSTOLIC BLOOD PRESSURE: 115 MMHG | WEIGHT: 138.5 LBS | HEART RATE: 87 BPM | DIASTOLIC BLOOD PRESSURE: 75 MMHG | BODY MASS INDEX: 23.64 KG/M2

## 2021-03-30 DIAGNOSIS — N64.4 BREAST PAIN: Primary | ICD-10-CM

## 2021-03-30 DIAGNOSIS — N94.10 DYSPAREUNIA, FEMALE: ICD-10-CM

## 2021-03-30 DIAGNOSIS — N63.0 LUMP OR MASS IN BREAST: ICD-10-CM

## 2021-03-30 PROCEDURE — 99214 OFFICE O/P EST MOD 30 MIN: CPT | Performed by: NURSE PRACTITIONER

## 2021-03-30 ASSESSMENT — MIFFLIN-ST. JEOR: SCORE: 1183.23
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
## 2021-04-06 ENCOUNTER — HOSPITAL ENCOUNTER (OUTPATIENT)
Dept: MAMMOGRAPHY | Facility: CLINIC | Age: 61
End: 2021-04-06
Attending: NURSE PRACTITIONER
Payer: COMMERCIAL

## 2021-04-06 DIAGNOSIS — N63.0 LUMP OR MASS IN BREAST: ICD-10-CM

## 2021-04-06 DIAGNOSIS — N64.4 BREAST PAIN: ICD-10-CM

## 2021-04-06 PROCEDURE — G0279 TOMOSYNTHESIS, MAMMO: HCPCS

## 2021-04-06 PROCEDURE — 76642 ULTRASOUND BREAST LIMITED: CPT | Mod: 50

## 2021-04-20 ENCOUNTER — OFFICE VISIT (OUTPATIENT)
Dept: FAMILY MEDICINE | Facility: CLINIC | Age: 61
End: 2021-04-20
Payer: COMMERCIAL

## 2021-04-20 VITALS
SYSTOLIC BLOOD PRESSURE: 103 MMHG | TEMPERATURE: 97.5 F | HEART RATE: 72 BPM | DIASTOLIC BLOOD PRESSURE: 67 MMHG | BODY MASS INDEX: 23 KG/M2 | OXYGEN SATURATION: 98 % | WEIGHT: 134 LBS

## 2021-04-20 DIAGNOSIS — Z00.00 ROUTINE HISTORY AND PHYSICAL EXAMINATION OF ADULT: Primary | ICD-10-CM

## 2021-04-20 DIAGNOSIS — R19.8 ALTERED BOWEL FUNCTION: ICD-10-CM

## 2021-04-20 DIAGNOSIS — R11.0 NAUSEA: ICD-10-CM

## 2021-04-20 LAB
ALBUMIN SERPL-MCNC: 3.9 G/DL (ref 3.4–5)
ALP SERPL-CCNC: 58 U/L (ref 40–150)
ALT SERPL W P-5'-P-CCNC: 19 U/L (ref 0–50)
ANION GAP SERPL CALCULATED.3IONS-SCNC: 1 MMOL/L (ref 3–14)
AST SERPL W P-5'-P-CCNC: 15 U/L (ref 0–45)
BILIRUB SERPL-MCNC: 0.3 MG/DL (ref 0.2–1.3)
BUN SERPL-MCNC: 11 MG/DL (ref 7–30)
CALCIUM SERPL-MCNC: 9 MG/DL (ref 8.5–10.1)
CHLORIDE SERPL-SCNC: 106 MMOL/L (ref 94–109)
CHOLEST SERPL-MCNC: 150 MG/DL
CO2 SERPL-SCNC: 32 MMOL/L (ref 20–32)
CREAT SERPL-MCNC: 0.75 MG/DL (ref 0.52–1.04)
ERYTHROCYTE [DISTWIDTH] IN BLOOD BY AUTOMATED COUNT: 13.1 % (ref 10–15)
GFR SERPL CREATININE-BSD FRML MDRD: 86 ML/MIN/{1.73_M2}
GLUCOSE SERPL-MCNC: 75 MG/DL (ref 70–99)
HCT VFR BLD AUTO: 40.8 % (ref 35–47)
HDLC SERPL-MCNC: 82 MG/DL
HGB BLD-MCNC: 13.9 G/DL (ref 11.7–15.7)
LDLC SERPL CALC-MCNC: 52 MG/DL
MCH RBC QN AUTO: 30.8 PG (ref 26.5–33)
MCHC RBC AUTO-ENTMCNC: 34.1 G/DL (ref 31.5–36.5)
MCV RBC AUTO: 91 FL (ref 78–100)
NONHDLC SERPL-MCNC: 68 MG/DL
PLATELET # BLD AUTO: 224 10E9/L (ref 150–450)
POTASSIUM SERPL-SCNC: 4 MMOL/L (ref 3.4–5.3)
PROT SERPL-MCNC: 7.1 G/DL (ref 6.8–8.8)
RBC # BLD AUTO: 4.51 10E12/L (ref 3.8–5.2)
SODIUM SERPL-SCNC: 139 MMOL/L (ref 133–144)
TRIGL SERPL-MCNC: 80 MG/DL
WBC # BLD AUTO: 4.9 10E9/L (ref 4–11)

## 2021-04-20 PROCEDURE — 80061 LIPID PANEL: CPT | Performed by: INTERNAL MEDICINE

## 2021-04-20 PROCEDURE — 99396 PREV VISIT EST AGE 40-64: CPT | Performed by: INTERNAL MEDICINE

## 2021-04-20 PROCEDURE — 85027 COMPLETE CBC AUTOMATED: CPT | Performed by: INTERNAL MEDICINE

## 2021-04-20 PROCEDURE — 80053 COMPREHEN METABOLIC PANEL: CPT | Performed by: INTERNAL MEDICINE

## 2021-04-20 PROCEDURE — 36415 COLL VENOUS BLD VENIPUNCTURE: CPT | Performed by: INTERNAL MEDICINE

## 2021-04-20 NOTE — LETTER
April 21, 2021      Katie BLOOD David  [address] [place] [address]        Dear ,    It was a pleasure seeing you for your physical examination.  I wanted to get back to you with your test results.  I have enclosed a copy for your review.     I am happy to report that your cbc or complete blood count is normal with no signs of anemia, leukemia or platelet abnormalities. Your chemistry panel shows no signs of diabetes.  Your blood salts, kidney tests, liver tests, and proteins are all fine.  The low anion gap is not a problem.     Your total cholesterol is 150 with the normal range being below 200.  Your HDL or good cholesterol is 82 with the normal range being above 50.  Your LDL or bad cholesterol is 52 with the normal range being below 130.  These numbers are super.     I am happy to bring you this overall excellent report. There is nothing in the labs to suggest any liver or other cause of the nausea.  If this does not improve soon let me know.  If you have any questions please call me.     Resulted Orders   CBC with platelets   Result Value Ref Range    WBC 4.9 4.0 - 11.0 10e9/L    RBC Count 4.51 3.8 - 5.2 10e12/L    Hemoglobin 13.9 11.7 - 15.7 g/dL    Hematocrit 40.8 35.0 - 47.0 %    MCV 91 78 - 100 fl    MCH 30.8 26.5 - 33.0 pg    MCHC 34.1 31.5 - 36.5 g/dL    RDW 13.1 10.0 - 15.0 %    Platelet Count 224 150 - 450 10e9/L   Comprehensive metabolic panel   Result Value Ref Range    Sodium 139 133 - 144 mmol/L    Potassium 4.0 3.4 - 5.3 mmol/L    Chloride 106 94 - 109 mmol/L    Carbon Dioxide 32 20 - 32 mmol/L    Anion Gap 1 (L) 3 - 14 mmol/L    Glucose 75 70 - 99 mg/dL    Urea Nitrogen 11 7 - 30 mg/dL    Creatinine 0.75 0.52 - 1.04 mg/dL    GFR Estimate 86 >60 mL/min/[1.73_m2]      Comment:      Non  GFR Calc  Starting 12/18/2018, serum creatinine based estimated GFR (eGFR) will be   calculated using the Chronic Kidney Disease Epidemiology Collaboration   (CKD-EPI)  equation.      GFR Estimate If Black >90 >60 mL/min/[1.73_m2]      Comment:       GFR Calc  Starting 12/18/2018, serum creatinine based estimated GFR (eGFR) will be   calculated using the Chronic Kidney Disease Epidemiology Collaboration   (CKD-EPI) equation.      Calcium 9.0 8.5 - 10.1 mg/dL    Bilirubin Total 0.3 0.2 - 1.3 mg/dL    Albumin 3.9 3.4 - 5.0 g/dL    Protein Total 7.1 6.8 - 8.8 g/dL    Alkaline Phosphatase 58 40 - 150 U/L    ALT 19 0 - 50 U/L    AST 15 0 - 45 U/L   Lipid panel reflex to direct LDL Non-fasting   Result Value Ref Range    Cholesterol 150 <200 mg/dL    Triglycerides 80 <150 mg/dL    HDL Cholesterol 82 >49 mg/dL    LDL Cholesterol Calculated 52 <100 mg/dL      Comment:      Desirable:       <100 mg/dl    Non HDL Cholesterol 68 <130 mg/dL       If you have any questions or concerns, please call the clinic at the number listed above.       Sincerely,      Mahesh Santoyo MD        ester
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
## 2021-04-21 ASSESSMENT — ASTHMA QUESTIONNAIRES: ACT_TOTALSCORE: 25

## 2021-04-21 NOTE — RESULT ENCOUNTER NOTE
It was a pleasure seeing you for your physical examination.  I wanted to get back to you with your test results.  I have enclosed a copy for your review.     I am happy to report that your cbc or complete blood count is normal with no signs of anemia, leukemia or platelet abnormalities. Your chemistry panel shows no signs of diabetes.  Your blood salts, kidney tests, liver tests, and proteins are all fine.  The low anion gap is not a problem.    Your total cholesterol is 150 with the normal range being below 200.  Your HDL or good cholesterol is 82 with the normal range being above 50.  Your LDL or bad cholesterol is 52 with the normal range being below 130.  These numbers are super.    I am happy to bring you this overall excellent report. There is nothing in the labs to suggest any liver or other cause of the nausea.  If this does not improve soon let me know.  If you have any questions please call me.    Mahesh Santoyo M.D.

## 2021-05-11 ENCOUNTER — TRANSFERRED RECORDS (OUTPATIENT)
Dept: HEALTH INFORMATION MANAGEMENT | Facility: CLINIC | Age: 61
End: 2021-05-11

## 2021-05-23 ENCOUNTER — MYC MEDICAL ADVICE (OUTPATIENT)
Dept: FAMILY MEDICINE | Facility: CLINIC | Age: 61
End: 2021-05-23

## 2021-05-23 DIAGNOSIS — R06.09 DOE (DYSPNEA ON EXERTION): Primary | ICD-10-CM

## 2021-06-11 ENCOUNTER — HOSPITAL ENCOUNTER (OUTPATIENT)
Dept: CARDIOLOGY | Facility: CLINIC | Age: 61
End: 2021-06-11
Attending: INTERNAL MEDICINE
Payer: COMMERCIAL

## 2021-06-11 DIAGNOSIS — R06.09 DOE (DYSPNEA ON EXERTION): ICD-10-CM

## 2021-06-17 ENCOUNTER — IMMUNIZATION (OUTPATIENT)
Dept: NURSING | Facility: CLINIC | Age: 61
End: 2021-06-17
Payer: COMMERCIAL

## 2021-06-17 PROCEDURE — 0001A PR COVID VAC PFIZER DIL RECON 30 MCG/0.3 ML IM: CPT

## 2021-06-17 PROCEDURE — 91300 PR COVID VAC PFIZER DIL RECON 30 MCG/0.3 ML IM: CPT

## 2021-07-08 ENCOUNTER — IMMUNIZATION (OUTPATIENT)
Dept: NURSING | Facility: CLINIC | Age: 61
End: 2021-07-08
Attending: INTERNAL MEDICINE
Payer: COMMERCIAL

## 2021-07-08 PROCEDURE — 0002A PR COVID VAC PFIZER DIL RECON 30 MCG/0.3 ML IM: CPT

## 2021-07-08 PROCEDURE — 91300 PR COVID VAC PFIZER DIL RECON 30 MCG/0.3 ML IM: CPT

## 2021-10-24 ENCOUNTER — HEALTH MAINTENANCE LETTER (OUTPATIENT)
Age: 61
End: 2021-10-24

## 2021-11-09 ENCOUNTER — TRANSFERRED RECORDS (OUTPATIENT)
Dept: HEALTH INFORMATION MANAGEMENT | Facility: CLINIC | Age: 61
End: 2021-11-09
Payer: COMMERCIAL

## 2022-01-09 ENCOUNTER — NURSE TRIAGE (OUTPATIENT)
Dept: NURSING | Facility: CLINIC | Age: 62
End: 2022-01-09
Payer: COMMERCIAL

## 2022-01-09 NOTE — TELEPHONE ENCOUNTER
Pt is calling in about getting her Covid booster on 1/6/2022. Pt now has pain, redness, and swelling on her left arm at the injection site. Pt denies any fever, difficulty breathing, or shortness of breath. Pt had a fever a couple days ago, but that is gone now.    Care advice given, and per protocol pt should be evaluated within 24 hours in the clinic. Pt was transferred to scheduling to make an appointment. Pt was advised to call back if pain, redness, or swelling increases, or if she develops a fever, or if symptoms worsen. Pt verbalized understanding.     Navneet Alvarez RN on 1/9/2022 at 4:44 PM      Reason for Disposition    [1] Redness around the injection site AND [2] started > 48 hours after getting vaccine AND [3] no fever  (Exception: red area < 1 inch or 2.5 cm wide)    [1] Pain, tenderness, or swelling at the injection site AND [2] over 3 days (72 hours) since vaccine AND [3] getting worse    Additional Information    Negative: [1] Difficulty breathing or swallowing AND [2] starts within 2 hours after injection    Negative: Sounds like a life-threatening emergency to the triager    Negative: [1] Symptoms of COVID-19 (e.g., cough, fever, SOB, or others) AND [2] within 14 days of EXPOSURE (close contact) with diagnosed or suspected COVID-19 patient    Negative: [1] Symptoms of COVID-19 (e.g., cough, fever, SOB, or others) AND [2] within 14 days of being at a crowded indoor or outdoor event (e.g., concert, festival, rally, wedding)    Negative: Typical COVID-19 symptoms (e.g., cough, difficulty breathing, loss of taste or smell, runny nose, sore throat) that are NOT expected from vaccine    Negative: [1] COVID-19 exposure AND [2] no symptoms, or symptoms not typical of COVID-19    Negative: Fever > 100.0 F (37.8 C) present > 3 days (72 hours)    Negative: [1] Redness or red streak around the injection site AND [2] started > 48 hours after getting vaccine AND [3] fever    Negative: [1] Fever > 101 F (38.3  C) AND [2] age > 60 years AND [3] started > 48 hours after getting vaccine    Negative: [1] Fever > 100.0 F (37.8 C) AND [2] bedridden (e.g., nursing home patient, CVA, chronic illness, recovering from surgery) AND [3] started > 48 hours after getting vaccine    Negative: [1] Fever > 100.0 F (37.8 C) AND [2] diabetes mellitus or weak immune system (e.g., HIV positive, cancer chemo, splenectomy, organ transplant, chronic steroids) AND [3] started > 48 hours after getting vaccine    Negative: Fever > 104 F (40 C)    Negative: Sounds like a severe, unusual reaction to the triager    Negative: [1] Fever > 100.0 F (37.8 C) AND [2] healthcare worker    Protocols used: CORONAVIRUS (COVID-19) VACCINE QUESTIONS AND [id number] 8.25.2021

## 2022-01-10 ENCOUNTER — OFFICE VISIT (OUTPATIENT)
Dept: FAMILY MEDICINE | Facility: CLINIC | Age: 62
End: 2022-01-10
Payer: COMMERCIAL

## 2022-01-10 VITALS
TEMPERATURE: 97.5 F | DIASTOLIC BLOOD PRESSURE: 72 MMHG | WEIGHT: 127.4 LBS | BODY MASS INDEX: 21.87 KG/M2 | HEART RATE: 61 BPM | OXYGEN SATURATION: 99 % | SYSTOLIC BLOOD PRESSURE: 105 MMHG

## 2022-01-10 DIAGNOSIS — L03.114 CELLULITIS OF LEFT UPPER EXTREMITY: Primary | ICD-10-CM

## 2022-01-10 PROCEDURE — 99213 OFFICE O/P EST LOW 20 MIN: CPT | Performed by: FAMILY MEDICINE

## 2022-01-10 RX ORDER — CEPHALEXIN 500 MG/1
500 CAPSULE ORAL 3 TIMES DAILY
Qty: 30 CAPSULE | Refills: 0 | Status: SHIPPED | OUTPATIENT
Start: 2022-01-10 | End: 2022-01-20

## 2022-01-10 ASSESSMENT — ASTHMA QUESTIONNAIRES
ACT_TOTALSCORE: 25
QUESTION_1 LAST FOUR WEEKS HOW MUCH OF THE TIME DID YOUR ASTHMA KEEP YOU FROM GETTING AS MUCH DONE AT WORK, SCHOOL OR AT HOME: NONE OF THE TIME
QUESTION_3 LAST FOUR WEEKS HOW OFTEN DID YOUR ASTHMA SYMPTOMS (WHEEZING, COUGHING, SHORTNESS OF BREATH, CHEST TIGHTNESS OR PAIN) WAKE YOU UP AT NIGHT OR EARLIER THAN USUAL IN THE MORNING: NOT AT ALL
QUESTION_4 LAST FOUR WEEKS HOW OFTEN HAVE YOU USED YOUR RESCUE INHALER OR NEBULIZER MEDICATION (SUCH AS ALBUTEROL): NOT AT ALL
QUESTION_2 LAST FOUR WEEKS HOW OFTEN HAVE YOU HAD SHORTNESS OF BREATH: NOT AT ALL
QUESTION_5 LAST FOUR WEEKS HOW WOULD YOU RATE YOUR ASTHMA CONTROL: COMPLETELY CONTROLLED

## 2022-01-10 NOTE — PROGRESS NOTES
Assessment & Plan     Cellulitis of left upper extremity  Cellulitis after vaccination few days ago.  Had a small erythematous area that has subsequently blossomed into a roughly 8 cm x 5 cm patch on her arm.  She has had hives with penicillin but no angioedema should be just fine to use cephalexin.  Discussed if she has any rash or reaction to this to stop it and take Benadryl and see a provider immediately.  We also corin a line around the current erythematous macule and I instructed her to follow this to make sure that the redness does not go beyond the marked area.  If it does she should be seen for labs and a change in antibiotics.    I do not think this is a true vaccine reaction since she had only typical mild symptoms of low-grade fever and malaise after the vaccination.  - cephALEXin (KEFLEX) 500 MG capsule; Take 1 capsule (500 mg) by mouth 3 times daily for 10 days    Prescription drug management  10 minutes spent on the date of the encounter doing chart review, history and exam, documentation and further activities per the note        See Patient Instructions    No follow-ups on file.    Carri Joya MD  [facility]    Elian   Katie is a 61 year old who presents for the following health issues     HPI     Pain History:  When did you first notice your pain? - Since Last Thursday  Had some chills and initial fevers which resolved in 48 hours  No reaction to initial series    Have you seen any provider previously for this issue? No  How has your pain affected your ability to work? 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 Musculoskeletal problem/pain  Onset/Duration: Last Thursday   Description  Location: Upper arm  - left  Joint Swelling: no  Redness: YES  Pain: YES  Warmth: YES  Intensity:  moderate  Progression of Symptoms:  worsening reports yesterday the area was just about 3 x 3 cm in size and  now it is roughly doubled  Accompanying signs and symptoms:   Fevers: no  Numbness/tingling/weakness: YES  History  Trauma to the area: no  Recent illness:  no  Previous similar problem: no  Previous evaluation:  no  Precipitating or alleviating factors:  Aggravating factors include: none  Therapies tried and outcome: lots of ice and    Pt. States that they have been experiencing pain since receiving Moderna Booster. Previously received Pfizer for first and second dose        Review of Systems   Constitutional, HEENT, cardiovascular, pulmonary, gi and gu systems are negative, except as otherwise noted.      Objective    /72   Pulse 61   Temp 97.5  F (36.4  C) (Temporal)   Wt 57.8 kg (127 lb 6.4 oz)   LMP 08/20/2013   SpO2 99%   BMI 21.87 kg/m    Body mass index is 21.87 kg/m .  Physical Exam   GENERAL: healthy, alert and no distress  SKIN:  8-9cm x 5 cm area of Erythematous slightly warm and firm left upper arm

## 2022-01-11 ASSESSMENT — ASTHMA QUESTIONNAIRES: ACT_TOTALSCORE: 25

## 2022-06-05 ENCOUNTER — HEALTH MAINTENANCE LETTER (OUTPATIENT)
Age: 62
End: 2022-06-05

## 2022-06-20 ENCOUNTER — OFFICE VISIT (OUTPATIENT)
Dept: FAMILY MEDICINE | Facility: CLINIC | Age: 62
End: 2022-06-20
Payer: COMMERCIAL

## 2022-06-20 VITALS
TEMPERATURE: 97 F | SYSTOLIC BLOOD PRESSURE: 105 MMHG | OXYGEN SATURATION: 98 % | HEIGHT: 64 IN | DIASTOLIC BLOOD PRESSURE: 67 MMHG | HEART RATE: 70 BPM | WEIGHT: 129 LBS | BODY MASS INDEX: 22.02 KG/M2 | RESPIRATION RATE: 16 BRPM

## 2022-06-20 DIAGNOSIS — Z00.00 ENCOUNTER FOR ANNUAL PHYSICAL EXAM: Primary | ICD-10-CM

## 2022-06-20 DIAGNOSIS — R07.89 ATYPICAL CHEST PAIN: ICD-10-CM

## 2022-06-20 LAB
ERYTHROCYTE [DISTWIDTH] IN BLOOD BY AUTOMATED COUNT: 12.7 % (ref 10–15)
ERYTHROCYTE [SEDIMENTATION RATE] IN BLOOD BY WESTERGREN METHOD: 7 MM/HR (ref 0–30)
HCT VFR BLD AUTO: 39.6 % (ref 35–47)
HGB BLD-MCNC: 12.8 G/DL (ref 11.7–15.7)
MCH RBC QN AUTO: 29.8 PG (ref 26.5–33)
MCHC RBC AUTO-ENTMCNC: 32.3 G/DL (ref 31.5–36.5)
MCV RBC AUTO: 92 FL (ref 78–100)
PLATELET # BLD AUTO: 215 10E3/UL (ref 150–450)
RBC # BLD AUTO: 4.29 10E6/UL (ref 3.8–5.2)
WBC # BLD AUTO: 4.7 10E3/UL (ref 4–11)

## 2022-06-20 PROCEDURE — 93000 ELECTROCARDIOGRAM COMPLETE: CPT | Performed by: INTERNAL MEDICINE

## 2022-06-20 PROCEDURE — 80061 LIPID PANEL: CPT | Performed by: INTERNAL MEDICINE

## 2022-06-20 PROCEDURE — 85027 COMPLETE CBC AUTOMATED: CPT | Performed by: INTERNAL MEDICINE

## 2022-06-20 PROCEDURE — 99396 PREV VISIT EST AGE 40-64: CPT | Performed by: INTERNAL MEDICINE

## 2022-06-20 PROCEDURE — 85652 RBC SED RATE AUTOMATED: CPT | Performed by: INTERNAL MEDICINE

## 2022-06-20 PROCEDURE — 80053 COMPREHEN METABOLIC PANEL: CPT | Performed by: INTERNAL MEDICINE

## 2022-06-20 PROCEDURE — 99213 OFFICE O/P EST LOW 20 MIN: CPT | Mod: 25 | Performed by: INTERNAL MEDICINE

## 2022-06-20 PROCEDURE — 36415 COLL VENOUS BLD VENIPUNCTURE: CPT | Performed by: INTERNAL MEDICINE

## 2022-06-20 ASSESSMENT — PAIN SCALES - GENERAL: PAINLEVEL: MILD PAIN (2)

## 2022-06-20 NOTE — LETTER
June 23, 2022      Katie Hernandez  [address]  [place] [address]        Dear ,    We are writing to inform you of your test results.    It was a pleasure seeing you for your physical examination.  I wanted to get back to you with your test results.  I have enclosed a copy for your review.     I am happy to report that your cbc or complete blood count is normal with no signs of anemia, leukemia or platelet abnormalities. Your chemistry panel shows no signs of diabetes.  Your blood salts, kidney tests, liver tests, and proteins are all fine.  I am not worried about the low sugar but to be safe let's do a repeat fasting sugar sometime in the next few weeks.  You can schedule a lab only visit using [employer].     Your total cholesterol is 130 with the normal range being below 200.  Your HDL or good cholesterol is 82 with the normal range being above 50.  Your LDL or bad cholesterol is 52 with the normal range being below 130.  These numbers are wonderful.     I am happy to bring you this overall excellent report.  If you have any questions please call me.     Mahesh Santoyo M.D. /[facility] Orders   CBC with platelets   Result Value Ref Range    WBC Count 4.7 4.0 - 11.0 10e3/uL    RBC Count 4.29 3.80 - 5.20 10e6/uL    Hemoglobin 12.8 11.7 - 15.7 g/dL    Hematocrit 39.6 35.0 - 47.0 %    MCV 92 78 - 100 fL    MCH 29.8 26.5 - 33.0 pg    MCHC 32.3 31.5 - 36.5 g/dL    RDW 12.7 10.0 - 15.0 %    Platelet Count 215 150 - 450 10e3/uL   Comprehensive metabolic panel   Result Value Ref Range    Sodium 141 133 - 144 mmol/L    Potassium 4.0 3.4 - 5.3 mmol/L    Chloride 109 94 - 109 mmol/L    Carbon Dioxide (CO2) 27 20 - 32 mmol/L    Anion Gap 5 3 - 14 mmol/L    Urea Nitrogen 15 7 - 30 mg/dL    Creatinine 0.73 0.52 - 1.04 mg/dL    Calcium 8.8 8.5 - 10.1 mg/dL    Glucose 66 (L) 70 - 99 mg/dL    Alkaline Phosphatase 56 40 - 150 U/L    AST 26 0 - 45 U/L    ALT 20 0 - 50 U/L    Protein Total 7.1 6.8 - 8.8  g/dL    Albumin 4.0 3.4 - 5.0 g/dL    Bilirubin Total 0.4 0.2 - 1.3 mg/dL    GFR Estimate >90 >60 mL/min/1.73m2      Comment:      Effective December 21, 2021 eGFRcr in adults is calculated using the 2021 CKD-EPI creatinine equation which includes age and gender (Arianna marte al., NEJ, DOI: 10.1056/BANXxv0024508)   Lipid panel reflex to direct LDL Fasting   Result Value Ref Range    Cholesterol 130 <200 mg/dL    Triglycerides 90 <150 mg/dL    Direct Measure HDL 78 >=50 mg/dL    LDL Cholesterol Calculated 34 <=100 mg/dL    Non HDL Cholesterol 52 <130 mg/dL    Patient Fasting > 8hrs? No     Narrative    Cholesterol  Desirable:  <200 mg/dL    Triglycerides  Normal:  Less than 150 mg/dL  Borderline High:  150-199 mg/dL  High:  200-499 mg/dL  Very High:  Greater than or equal to 500 mg/dL    Direct Measure HDL  Female:  Greater than or equal to 50 mg/dL   Male:  Greater than or equal to 40 mg/dL    LDL Cholesterol  Desirable:  <100mg/dL  Above Desirable:  100-129 mg/dL   Borderline High:  130-159 mg/dL   High:  160-189 mg/dL   Very High:  >= 190 mg/dL    Non HDL Cholesterol  Desirable:  130 mg/dL  Above Desirable:  130-159 mg/dL  Borderline High:  160-189 mg/dL  High:  190-219 mg/dL  Very High:  Greater than or equal to 220 mg/dL   Erythrocyte sedimentation rate auto   Result Value Ref Range    Erythrocyte Sedimentation Rate 7 0 - 30 mm/hr       If you have any questions or concerns, please call the clinic at the number listed above.       Sincerely,      Mahesh Santoyo MD

## 2022-06-22 LAB
ALBUMIN SERPL-MCNC: 4 G/DL (ref 3.4–5)
ALP SERPL-CCNC: 56 U/L (ref 40–150)
ALT SERPL W P-5'-P-CCNC: 20 U/L (ref 0–50)
ANION GAP SERPL CALCULATED.3IONS-SCNC: 5 MMOL/L (ref 3–14)
AST SERPL W P-5'-P-CCNC: 26 U/L (ref 0–45)
BILIRUB SERPL-MCNC: 0.4 MG/DL (ref 0.2–1.3)
BUN SERPL-MCNC: 15 MG/DL (ref 7–30)
CALCIUM SERPL-MCNC: 8.8 MG/DL (ref 8.5–10.1)
CHLORIDE BLD-SCNC: 109 MMOL/L (ref 94–109)
CHOLEST SERPL-MCNC: 130 MG/DL
CO2 SERPL-SCNC: 27 MMOL/L (ref 20–32)
CREAT SERPL-MCNC: 0.73 MG/DL (ref 0.52–1.04)
FASTING STATUS PATIENT QL REPORTED: NO
GFR SERPL CREATININE-BSD FRML MDRD: >90 ML/MIN/1.73M2
GLUCOSE BLD-MCNC: 66 MG/DL (ref 70–99)
HDLC SERPL-MCNC: 78 MG/DL
LDLC SERPL CALC-MCNC: 34 MG/DL
NONHDLC SERPL-MCNC: 52 MG/DL
POTASSIUM BLD-SCNC: 4 MMOL/L (ref 3.4–5.3)
PROT SERPL-MCNC: 7.1 G/DL (ref 6.8–8.8)
SODIUM SERPL-SCNC: 141 MMOL/L (ref 133–144)
TRIGL SERPL-MCNC: 90 MG/DL

## 2022-06-23 NOTE — RESULT ENCOUNTER NOTE
It was a pleasure seeing you for your physical examination.  I wanted to get back to you with your test results.  I have enclosed a copy for your review.     I am happy to report that your cbc or complete blood count is normal with no signs of anemia, leukemia or platelet abnormalities. Your chemistry panel shows no signs of diabetes.  Your blood salts, kidney tests, liver tests, and proteins are all fine.  I am not worried about the low sugar but to be safe let's do a repeat fasting sugar sometime in the next few weeks.  You can schedule a lab only visit using [employer].    Your total cholesterol is 130 with the normal range being below 200.  Your HDL or good cholesterol is 82 with the normal range being above 50.  Your LDL or bad cholesterol is 52 with the normal range being below 130.  These numbers are wonderful.    I am happy to bring you this overall excellent report.  If you have any questions please call me.    Mahesh Santoyo M.D.

## 2022-07-31 ENCOUNTER — HEALTH MAINTENANCE LETTER (OUTPATIENT)
Age: 62
End: 2022-07-31

## 2022-10-15 ENCOUNTER — HEALTH MAINTENANCE LETTER (OUTPATIENT)
Age: 62
End: 2022-10-15

## 2022-11-16 RX ORDER — OMEGA-3/DHA/EPA/FISH OIL 60 MG-90MG
CAPSULE ORAL
COMMUNITY
Start: 2022-01-01

## 2022-12-05 ENCOUNTER — VIRTUAL VISIT (OUTPATIENT)
Dept: CARDIOLOGY | Facility: CLINIC | Age: 62
End: 2022-12-05
Attending: INTERNAL MEDICINE
Payer: COMMERCIAL

## 2022-12-05 DIAGNOSIS — R00.2 PALPITATIONS: Primary | ICD-10-CM

## 2022-12-05 DIAGNOSIS — R07.89 CHEST DISCOMFORT: ICD-10-CM

## 2022-12-05 PROCEDURE — 99203 OFFICE O/P NEW LOW 30 MIN: CPT | Mod: GT | Performed by: INTERNAL MEDICINE

## 2022-12-05 NOTE — LETTER
"12/5/2022      RE: Katie Hernandez  [address]  [facility] [address]       Dear Colleague,    Thank you for the opportunity to participate in the care of your patient, Katie Hernandez, at the [facility] [place] at [facility]. Please see a copy of my visit note below.      Katie is a 62 year old who is being evaluated via a billable video visit.      How would you like to obtain your AVS? MyChart  If the video visit is dropped, the invitation should be resent by: Text to cell phone: [phone]  Will anyone else be joining your video visit? No   Patient states is currently in the [facility]: Yes    ------------    I had the pleasure of participating in coordination of clinical cardiovascular care for patient, Katie Hernandez, via [facility]'s virtual visit protocol during the COVID-19 Pandemic.    History     Ms. Katie Hernandez is 62 year old woman with history of leiomyoma status post hysterectomy, asthma, colitis. She initially established care in 2018 with chest pain that was atypical for angina. She had an exercise stress echo in 2021 that showed no ischemia and exercised for 9.30 minutes.      She has been experiencing intermittent palpitations usually at night, along with mild chest discomfort and intermittent peripheral edema.  She believes this is due to a very disturbed sleep from having noisy neighbor at her [facility].  She is also been experiencing some hot flashes.  She denies any exertional chest pain or dyspnea.    PAST MEDICAL HISTORY:  Past Medical History:   Diagnosis Date     Abdominal pain 03/2017    ct with long segment of colits descending colon, then colonoscopy and bx by Dr. Dailey and nl     Abnormal EKG 07/2020    nl echo     Abnormal stools 5/30/2013     Agatston coronary artery calcium score less than 100 07/2018    score of 0     Anemia     \"Related to heavy periods\"     Arthritis     " Controlled by eliminating dairy.     Breast disorder 2014, 2017    Pain, lump     Complex endometrial hyperplasia with atypia 10/17/2013    Path dx w complete hysterectomy.      H/O colonoscopy 03/2017    nl, bx nl     Hypertension     occasional     Kidney cysts     left seen on CT     Macular degeneration 2015    Taking vitamin.     Menarche     cycles q 28 x 5 d     Menorrhagia 5/16/2007    Oct 2009 ->anemia to 7+ gms.  Resolved with OTC po FE 50 mg/d      Postpartum depression     Only for a few days     Precancerous skin lesion     excised -- abdomen. ->yrly Derm cks     S/P total hysterectomy and bilateral salpingo-oophorectomy 10/17/2013    Path: -->Complex endometrial hyperplasia with atypia.      Shingles 03/2020     SOB (shortness of breath) 04/2017    also elevated d dimer - ct no pe, some fibrosis or atelect, breast cysts, then est echo nl 4/17     Swelling of the ankle, feet, or leg     Ankles     Uncomplicated asthma     Childhood. Currently have sensitive airways.     Unspecified hemorrhoids without mention of complication 2007    bleeding to anemia -- hgb 8.4 and lower....     Uterine leiomyoma 5/13/2009    [place] diagnosis [place] in [place] 8/[place]/2011 US in this EMR confirms multiple myomas with a 12 mm endometrial stripe.  (Problem list name updated by automated process. Provider to review and confirm*       CURRENT MEDICATIONS:  Current Outpatient Medications   Medication Sig Dispense Refill     calcium carbonate (OS-MINERVA) 1500 (600 Ca) MG tablet Take by mouth 2 times daily (with meals)       co-enzyme Q-10 100 MG CAPS capsule Take by mouth daily       fish oil-omega-3 fatty acids 500 MG capsule        magnesium 100 MG CAPS Take by mouth daily       Multiple Vitamins-Minerals (VITEYES AREDS FORMULA/LUTEIN) CAPS Take 1 tablet by mouth daily         FAMILY HISTORY:  Family History   Problem Relation Age of Onset     Hypertension Mother 45     Eye Disorder Mother         macular degeneration      Psychotic Disorder Mother         Borderline Personality Disorder ([id number])     Anxiety Disorder Mother      Mental Illness Mother      Diabetes Father 75        DMII     Hypertension Father 65     Other Cancer Father         Carcinoma, Melanoma     Cerebrovascular Disease Father      Thyroid Disease Sister         Thyroid cancer     Asthma Brother      Cancer Maternal Grandmother 60        uterine/breast     Uterine Cancer Maternal Grandmother      Breast Cancer Paternal Grandmother 45         age 55     Cancer Maternal Aunt 60        ovarian     Cancer - colorectal Maternal Aunt         possible     Breast Cancer Other      Uterine Cancer Other      Mental Illness Other        SOCIAL HISTORY:  Social History     Tobacco Use     Smoking status: Never     Smokeless tobacco: Never   Substance Use Topics     Alcohol use: Never     Comment: 1/2 d / mo     Drug use: Never       ROS:   10 point ROS negative except HPI    EXAM:  Exam: limited due to the nature of this visit   In general, the patient is in no acute distress.    Breathing is unlabored.   HEENT: Sclerae white, not injected.    Neck: No jugular venous distension.    Extremities: No edema, per patient.   Neurologic: Alert and oriented to person/place/time, normal speech and affect  Skin: No rash on exposed skin    Labs:  Chemistry panel: Recent Labs   Lab Test 22  1621 21  1217    139   POTASSIUM 4.0 4.0   CHLORIDE 109 106   CO2 27 32   ANIONGAP 5 1*   GLC 66* 75   BUN 15 11   CR 0.73 0.75   MINERVA 8.8 9.0   GFRESTIMATED >90 86   AST 26 15   ALT 20 19       CBC:   Recent Labs   Lab Test 22  1621 21  1217   WBC 4.7 4.9   RBC 4.29 4.51   HGB 12.8 13.9   HCT 39.6 40.8   MCV 92 91   MCH 29.8 30.8   MCHC 32.3 34.1   RDW 12.7 13.1    224       Lipid Panel:  Recent Labs   Lab Test 22  1621 21  1217   CHOL 130 150   HDL 78 82   LDL 34 52   TRIG 90 80       Thyroid:   TSH   Date Value Ref Range Status   2017 1.25  0.40 - 4.00 mU/L Final       Echocardiograms:  2020  Left ventricular size, global systolic function, and wall motion are normal,  estimated LVEF 60-65%.  Right ventricular global function is normal.  No significant valvular abnormalities.  There are no prior studies available for comparison.    Stress Tests:  2021 exercise stress echo  Interpretation Summary  1. The patient exercised 9:30min. Above average functional capacity for age.  2. The patient did not exhibit any symptoms during exercise.  3. This was a normal stress EKG with no evidence of stress-induced ischemia.  4. Normal left ventricular function and wall motion at rest and post-stress.    5/3/2017  Interpretation Summary  The patient exhibited no chest pain during exercise.  The Duke treadmill score was low risk ( >5 Duke score).  This was a normal stress EKG with no evidence of stress-induced ischemia. This  was a normal stress echocardiogram with no evidence of stress-induced  ischemia.      Additional Imagin/2018 CAC scan  1.  No coronary calcifications.  2.  The total Agatston calcium score is 0 placing the patient in the  lowest percentile when compared to age and gender matched control  group.  1. Unchanged 3 mm pulmonary nodule in the right lower lobe from  2017. No further follow-up is recommended per Fleischner Society  criteria.  2. Unchanged fluid density ovoid lesions within the left breast  corresponding with simple cysts as seen on ultrasound from 2017.      Assessment and Plan:   Katie Hernandez is a s a 62 year old woman with     Intermittent palpitations  Dependent peripheral edema  Agitation from sleep deprivation  Chest discomfort, atypical for angina  CAC score in 2018 was 0    Katie appears to have symptoms of sympathetic overdrive from sleep deprivation manifesting in the form of palpitations and chest discomfort.  She also does intermittent fasting consuming 500 venita a day for 2 days a week. We  discussed maintaining adequate nutrition, fluid hydration and consider measures to mitigate stress.  I have reassured her that her cardiac function is normal based on her prior assessments and she had no evidence of coronary atherosclerosis based on CAC score in 2018.  She was advised to contact us if her palpitations worsened and we will pursue an ambulatory ECG monitoring at that time.       Follow-Up  As needed  MyChart message if palpitations, chest discomfort or edema worsens     Video Visit Details:    Type of service:  Video Visit    Video Start Time: 9.45 am  Video End Time: 10.05 am    Originating Location (pt. Location): Home    Distant Location (provider location):  Off-site    Platform used for Video Visit: [employer]      In addition to visit time documented above, I spent an additional 15 minutes on data review and documentation.      Mae Etienne MD, MS  Professor of Medicine  Cardiovascular Medicine

## 2022-12-05 NOTE — NURSING NOTE
Chief Complaint   Patient presents with     Consult       Patient confirms medications and allergies are accurate via patients echeck in completion, and or denies any changes since last reviewed/verified.     Kecia Neil, Virtual Facilitator

## 2022-12-05 NOTE — PROGRESS NOTES
"  Katie is a 62 year old who is being evaluated via a billable video visit.      How would you like to obtain your AVS? MyChart  If the video visit is dropped, the invitation should be resent by: Text to cell phone: [phone]  Will anyone else be joining your video visit? No   Patient states is currently in the [facility]: Yes    ------------    I had the pleasure of participating in coordination of clinical cardiovascular care for patient, Katie Hernandez, via  [facility] virtual visit protocol during the COVID-19 Pandemic.    History     Ms. Katie Hernandez is 62 year old woman with history of leiomyoma status post hysterectomy, asthma, colitis. She initially established care in 2018 with chest pain that was atypical for angina. She had an exercise stress echo in 2021 that showed no ischemia and exercised for 9.30 minutes.      She has been experiencing intermittent palpitations usually at night, along with mild chest discomfort and intermittent peripheral edema.  She believes this is due to a very disturbed sleep from having noisy neighbor at her [facility].  She is also been experiencing some hot flashes.  She denies any exertional chest pain or dyspnea.    PAST MEDICAL HISTORY:  Past Medical History:   Diagnosis Date     Abdominal pain 03/2017    ct with long segment of colits descending colon, then colonoscopy and bx by Dr. Dailey and nl     Abnormal EKG 07/2020    nl echo     Abnormal stools 5/30/2013     Agatston coronary artery calcium score less than 100 07/2018    score of 0     Anemia     \"Related to heavy periods\"     Arthritis     Controlled by eliminating dairy.     Breast disorder 2014, 2017    Pain, lump     Complex endometrial hyperplasia with atypia 10/17/2013    Path dx w complete hysterectomy.      H/O colonoscopy 03/2017    nl, bx nl     Hypertension     occasional     Kidney cysts     left seen on CT     Macular degeneration 2015    Taking vitamin.     Menarche     cycles " q 28 x 5 d     Menorrhagia 5/16/2007    Oct 2009 ->anemia to 7+ gms.  Resolved with OTC po FE 50 mg/d      Postpartum depression     Only for a few days     Precancerous skin lesion     excised -- abdomen. ->yrly Derm cks     S/P total hysterectomy and bilateral salpingo-oophorectomy 10/17/2013    Path: -->Complex endometrial hyperplasia with atypia.      Shingles 03/2020     SOB (shortness of breath) 04/2017    also elevated d dimer - ct no pe, some fibrosis or atelect, breast cysts, then est echo nl 4/17     Swelling of the ankle, feet, or leg     Ankles     Uncomplicated asthma     Childhood. Currently have sensitive airways.     Unspecified hemorrhoids without mention of complication 2007    bleeding to anemia -- hgb 8.4 and lower....     Uterine leiomyoma 5/13/2009    [place] diagnosis while in [place] 8[place]10/2011 US in this EMR confirms multiple myomas with a 12 mm endometrial stripe.  (Problem list name updated by automated process. Provider to review and confirm*       CURRENT MEDICATIONS:  Current Outpatient Medications   Medication Sig Dispense Refill     calcium carbonate (OS-MINERVA) 1500 (600 Ca) MG tablet Take by mouth 2 times daily (with meals)       co-enzyme Q-10 100 MG CAPS capsule Take by mouth daily       fish oil-omega-3 fatty acids 500 MG capsule        magnesium 100 MG CAPS Take by mouth daily       Multiple Vitamins-Minerals (VITEYES AREDS FORMULA/LUTEIN) CAPS Take 1 tablet by mouth daily         FAMILY HISTORY:  Family History   Problem Relation Age of Onset     Hypertension Mother 45     Eye Disorder Mother         macular degeneration     Psychotic Disorder Mother         Borderline Personality Disorder ([id number])     Anxiety Disorder Mother      Mental Illness Mother      Diabetes Father 75        DMII     Hypertension Father 65     Other Cancer Father         Carcinoma, Melanoma     Cerebrovascular Disease Father      Thyroid Disease Sister         Thyroid cancer     Asthma Brother      Cancer  Maternal Grandmother 60        uterine/breast     Uterine Cancer Maternal Grandmother      Breast Cancer Paternal Grandmother 45         age 55     Cancer Maternal Aunt 60        ovarian     Cancer - colorectal Maternal Aunt         possible     Breast Cancer Other      Uterine Cancer Other      Mental Illness Other        SOCIAL HISTORY:  Social History     Tobacco Use     Smoking status: Never     Smokeless tobacco: Never   Substance Use Topics     Alcohol use: Never     Comment: 1/2 d / mo     Drug use: Never       ROS:   10 point ROS negative except HPI    EXAM:  Exam: limited due to the nature of this visit   In general, the patient is in no acute distress.    Breathing is unlabored.   HEENT: Sclerae white, not injected.    Neck: No jugular venous distension.    Extremities: No edema, per patient.   Neurologic: Alert and oriented to person/place/time, normal speech and affect  Skin: No rash on exposed skin    Labs:  Chemistry panel: Recent Labs   Lab Test 22  1621 21  1217    139   POTASSIUM 4.0 4.0   CHLORIDE 109 106   CO2 27 32   ANIONGAP 5 1*   GLC 66* 75   BUN 15 11   CR 0.73 0.75   MINERVA 8.8 9.0   GFRESTIMATED >90 86   AST 26 15   ALT 20 19       CBC:   Recent Labs   Lab Test 22  1621 21  1217   WBC 4.7 4.9   RBC 4.29 4.51   HGB 12.8 13.9   HCT 39.6 40.8   MCV 92 91   MCH 29.8 30.8   MCHC 32.3 34.1   RDW 12.7 13.1    224       Lipid Panel:  Recent Labs   Lab Test 22  1621 21  1217   CHOL 130 150   HDL 78 82   LDL 34 52   TRIG 90 80       Thyroid:   TSH   Date Value Ref Range Status   2017 1.25 0.40 - 4.00 mU/L Final       Echocardiograms:  2020  Left ventricular size, global systolic function, and wall motion are normal,  estimated LVEF 60-65%.  Right ventricular global function is normal.  No significant valvular abnormalities.  There are no prior studies available for comparison.    Stress Tests:  2021 exercise stress echo  Interpretation  Summary  1. The patient exercised 9:30min. Above average functional capacity for age.  2. The patient did not exhibit any symptoms during exercise.  3. This was a normal stress EKG with no evidence of stress-induced ischemia.  4. Normal left ventricular function and wall motion at rest and post-stress.    5/3/2017  Interpretation Summary  The patient exhibited no chest pain during exercise.  The Duke treadmill score was low risk ( >5 Duke score).  This was a normal stress EKG with no evidence of stress-induced ischemia. This  was a normal stress echocardiogram with no evidence of stress-induced  ischemia.      Additional Imagin/2018 CAC scan  1.  No coronary calcifications.  2.  The total Agatston calcium score is 0 placing the patient in the  lowest percentile when compared to age and gender matched control  group.  1. Unchanged 3 mm pulmonary nodule in the right lower lobe from  2017. No further follow-up is recommended per Fleischner Society  criteria.  2. Unchanged fluid density ovoid lesions within the left breast  corresponding with simple cysts as seen on ultrasound from 2017.      Assessment and Plan:   Katie Hernandez is a s a 62 year old woman with     Intermittent palpitations  Dependent peripheral edema  Agitation from sleep deprivation  Chest discomfort, atypical for angina  CAC score in 2018 was 0    Katie appears to have symptoms of sympathetic overdrive from sleep deprivation manifesting in the form of palpitations and chest discomfort.  She also does intermittent fasting consuming 500 venita a day for 2 days a week. We discussed maintaining adequate nutrition, fluid hydration and consider measures to mitigate stress.  I have reassured her that her cardiac function is normal based on her prior assessments and she had no evidence of coronary atherosclerosis based on CAC score in 2018.  She was advised to contact us if her palpitations worsened and we will pursue an ambulatory ECG  monitoring at that time.       Follow-Up  As needed  MyChart message if palpitations, chest discomfort or edema worsens     Video Visit Details:    Type of service:  Video Visit    Video Start Time: 9.45 am  Video End Time: 10.05 am    Originating Location (pt. Location): Home    Distant Location (provider location):  Off-site    Platform used for Video Visit: [email or web address]      In addition to visit time documented above, I spent an additional 15 minutes on data review and documentation.      Mae Etienne MD, MS  Professor of Medicine  Cardiovascular Medicine

## 2022-12-06 NOTE — PATIENT INSTRUCTIONS
"Cardiology Providers you saw during your visit:  Dr. Etienne    Medication changes: None    Follow up: with Dr. Etienne as needed    If you have any questions, contact  Cecil Clayton RN. We are encouraging the use of [employer] to communicate with your HealthCare Provider     To contact the [facility], please call, [phone]  To schedule an appointment or to leave a message for your Care Team Press #1  If you are a physician calling for another physician Press #2  For Billing Press #3  For Medical Records Press #4\"    "

## 2022-12-06 NOTE — NURSING NOTE
No changes made at this appt. Follow up with Dr. Etienne as needed.     Cecil Clayton, RN   Cardiology Nurse Coordinator

## 2023-05-26 ENCOUNTER — NURSE TRIAGE (OUTPATIENT)
Dept: NURSING | Facility: CLINIC | Age: 63
End: 2023-05-26
Payer: COMMERCIAL

## 2023-05-26 NOTE — TELEPHONE ENCOUNTER
Patient was called back and a message left on voice mail informing her that it will be Tuesday before getting a call back due to the Memorial Day holiday and instruction about what changes would require going to the emergency room.

## 2023-05-26 NOTE — TELEPHONE ENCOUNTER
Nurse Triage SBAR    Is this a 2nd Level Triage? NO    Situation: Patient not previously seen by [facility] - due to symptoms would like to be seen before first new available.    Background: Patient, who has a familial history of melanoma and she has had pre-cancerous moles,noticed some brown spots on the great toe of her left foot last week (uable to tell me if on skin or nail). Last night it started to hurt. The pain woke her from her sleep. Pain is not constant - but is an achy 6/10 when it happens. She indicates that it is now affecting the nail - it is misshapened pened from them.  She has been followed by dermatology else where - but her provider retired and it was recommended she be seen at the [facility].  She is very concerned about getting this evaluated ASAP.    Assessment: Patient should be seen soon to have this area evaluated - sooner than December.    Protocol Recommended Disposition:   See PCP Within 2 Weeks    Recommendation:   Call the patient next week with recommendations about how soon to get evaluated and if she can be seen sooner at this clinic     Routed to provider/pool Kathleen M Doege RN      Reason for Disposition    [1] MILD pain (e.g., does not interfere with normal activities) AND [2] present > 7 days    Additional Information    Negative: Followed a toe injury    Negative: Wound looks infected    Negative: Caused by an animal bite    Negative: Caused by frostbite    Negative: Caused by an ingrown toenail    Negative: Athlete's Foot suspected (i.e., itchy red rash in web space between toes)    Negative: Foot pain is main symptom    Negative: Foot is cool or blue in comparison to other foot    Negative: Purple or black skin on toe  (Exception: simple recalled injury with bruise)    Negative: [1] Looks infected (spreading redness, red streak, pus) AND [2] fever    Negative: Patient sounds very sick or weak to the triager    Negative: [1] SEVERE pain (e.g., excruciating, unable to  "do any normal activities) AND [2] not improved after 2 hours of pain medicine    Negative: [1] Redness spreading into foot or red streak into foot AND [2] no fever    Negative: Looks like a boil, infected sore, or deep ulcer    Negative: [1] Swollen toe AND [2] no fever  (Exceptions: just localized bump from bunion, corns, insect bite, sting)    Negative: Yellow pus seen in skin around toenail (cuticle area), or pus seen under toenail    Negative: Numbness in one foot (i.e., loss of sensation)    Negative: [1] MODERATE pain (e.g., interferes with normal activities, limping) AND [2] present > 3 days    Negative: Localized redness and swelling of skin around nail (i.e., cuticle area or nail fold)    Negative: Pain in the big toe joint    Negative: Diabetes mellitus or peripheral vascular disease (\"poor circulation\")    Answer Assessment - Initial Assessment Questions  1. ONSET: \"When did the pain start?\"       FIRST NOTICED SPOTS 1 WEEK AGO - BUT PAIN DID NOT START UNTIL LAST NIGHT    2. LOCATION: \"Where is the pain located?\"   (e.g., around nail, entire toe, at foot joint)       NEAR ON NAIL OF GREAT TOE ON LEFT FOOT    3. PAIN: \"How bad is the pain?\"    (Scale 1-10; or mild, moderate, severe)    -  MILD (1-3): doesn't interfere with normal activities     -  MODERATE (4-7): interferes with normal activities (e.g., work or school) or awakens from sleep, limping     -  SEVERE (8-10): excruciating pain, unable to do any normal activities, unable to walk      WOKE UP WITH THE PAIN OF ABOUT A 6/10 ACHING PAIN ON THE TOP HALF OF THE TOE    4. APPEARANCE: \"What does the toe look like?\" (e.g., redness, swelling, bruising, pallor)      NO REDNESS OR SWELLING - BUT TOE NAIL IS NOW IRREGULAR. THERE ARE 3 \"BROWN PATCHES AND THEY ARE AFFECTING THE SHAPE OF THE TOE NAIL.    5. CAUSE: \"What do you think is causing the toe pain?\"      THESE SPOTS AND CONCERNED ABOUT MELANOMA - FAMILIAL HISTORY AND PATIENT HAS HAD PRE-CANCEROUS MOLES " "IN THE PAST    6. OTHER SYMPTOMS: \"Do you have any other symptoms?\" (e.g., leg pain, rash, fever, numbness)      NONE    7. PREGNANCY: \"Is there any chance you are pregnant?\" \"When was your last menstrual period?\"      NA    Protocols used: TOE PAIN-A-AH      "

## 2023-05-31 ENCOUNTER — HOSPITAL ENCOUNTER (OUTPATIENT)
Dept: MAMMOGRAPHY | Facility: CLINIC | Age: 63
Discharge: HOME | End: 2023-05-31
Attending: OBSTETRICS & GYNECOLOGY | Admitting: OBSTETRICS & GYNECOLOGY
Payer: COMMERCIAL

## 2023-05-31 DIAGNOSIS — Z12.31 BREAST CANCER SCREENING BY MAMMOGRAM: ICD-10-CM

## 2023-05-31 PROCEDURE — 77067 SCR MAMMO BI INCL CAD: CPT

## 2023-06-01 ENCOUNTER — TELEPHONE (OUTPATIENT)
Dept: OBGYN | Facility: CLINIC | Age: 63
End: 2023-06-01

## 2023-06-01 NOTE — TELEPHONE ENCOUNTER
[facility] Call Center    Phone Message    May a detailed message be left on voicemail: yes     Reason for Call: Patient would like to schedule an annual appt with Dr. Hay. Writer not able to pull any appts up. Please call patient to schedule. Thank you.    Action Taken: Message routed to:  Other: S    Travel Screening: Not Applicable

## 2023-06-04 NOTE — PROGRESS NOTES
"  History     Ms. Katie Hernandez is 63 year old woman with history of leiomyoma status post hysterectomy, asthma, colitis. She initially established care in 2018 with chest pain that was atypical for angina. She had an exercise stress echo in 2021 that showed no ischemia and exercised for 9.30 minutes.      She was experiencing intermittent palpitations at night, along with mild chest discomfort due to disturbed sleep from having a noisy neighbor at her new [facility].  For the last 2 weeks, the noise level has been low. She has been experiencing some sweating and intermittent peripheral edema worse at the end of the day.  She is overall worried that this will negatively impact her heart health. She denies any exertional chest pain or dyspnea.     PAST MEDICAL HISTORY:  Past Medical History:   Diagnosis Date     Abdominal pain 03/2017    ct with long segment of colits descending colon, then colonoscopy and bx by Dr. Dailey and nl     Abnormal EKG 07/2020    nl echo     Abnormal stools 5/30/2013     Agatston coronary artery calcium score less than 100 07/2018    score of 0     Anemia     \"Related to heavy periods\"     Arthritis     Controlled by eliminating dairy.     Breast disorder 2014, 2017    Pain, lump     Complex endometrial hyperplasia with atypia 10/17/2013    Path dx w complete hysterectomy.      H/O colonoscopy 03/2017    nl, bx nl     Hypertension     occasional     Kidney cysts     left seen on CT     Macular degeneration 2015    Taking vitamin.     Menarche     cycles q 28 x 5 d     Menorrhagia 5/16/2007    Oct 2009 ->anemia to 7+ gms.  Resolved with OTC po FE 50 mg/d      Postpartum depression     Only for a few days     Precancerous skin lesion     excised -- abdomen. ->yrly Derm cks     S/P total hysterectomy and bilateral salpingo-oophorectomy 10/17/2013    Path: -->Complex endometrial hyperplasia with atypia.      Shingles 03/2020     SOB (shortness of breath) 04/2017    also elevated d dimer - " ct no pe, some fibrosis or atelect, breast cysts, then est echo nl      Swelling of the ankle, feet, or leg     Ankles     Uncomplicated asthma     Childhood. Currently have sensitive airways.     Unspecified hemorrhoids without mention of complication     bleeding to anemia -- hgb 8.4 and lower....     Uterine leiomyoma 2009    [place] diagnosis [place] in [place] 8/[place]/2011 US in this EMR confirms multiple myomas with a 12 mm endometrial stripe.  (Problem list name updated by automated process. Provider to review and confirm*       CURRENT MEDICATIONS:  Current Outpatient Medications   Medication Sig Dispense Refill     calcium carbonate (OS-MINERVA) 1500 (600 Ca) MG tablet Take by mouth 2 times daily (with meals)       co-enzyme Q-10 100 MG CAPS capsule Take by mouth daily       fish oil-omega-3 fatty acids 500 MG capsule        magnesium 100 MG CAPS Take by mouth daily       Multiple Vitamins-Minerals (VITEYES AREDS FORMULA/LUTEIN) CAPS Take 1 tablet by mouth daily         FAMILY HISTORY:  Family History   Problem Relation Age of Onset     Hypertension Mother 45     Eye Disorder Mother         macular degeneration     Psychotic Disorder Mother         Borderline Personality Disorder ([id number])     Anxiety Disorder Mother      Mental Illness Mother      Diabetes Father 75        DMII     Hypertension Father 65     Other Cancer Father         Carcinoma, Melanoma     Cerebrovascular Disease Father      Thyroid Disease Sister         Thyroid cancer     Asthma Brother      Cancer Maternal Grandmother 60        uterine/breast     Uterine Cancer Maternal Grandmother      Breast Cancer Paternal Grandmother 45         age 55     Cancer Maternal Aunt 60        ovarian     Cancer - colorectal Maternal Aunt         possible     Breast Cancer Other      Uterine Cancer Other      Mental Illness Other        SOCIAL HISTORY:  Social History     Tobacco Use     Smoking status: Never     Smokeless tobacco: Never    Substance Use Topics     Alcohol use: Never     Comment: 1/2 d / mo     Drug use: Never       ROS:   10 point ROS negative except HPI    EXAM:  BP 95/62 (BP Location: Right arm, Patient Position: Supine, Cuff Size: Adult Regular)   Pulse 67   Wt 60.9 kg (134 lb 3.2 oz)   LMP 08/20/2013   SpO2 97%   BMI 23.04 kg/m      In general, the patient is in no apparent distress.        HEENT: Sclerae white, not injected.    Neck: No JVD. No thyromegaly  Heart: regular with normal S1, S2. No murmur. No audible rub or gallop.    Lungs: Clear bilaterally.  No rhonchi, wheezes, rales.   Extremities: No edema.  The pulses are 2+at the radial bilaterally.  Psych: pleasant and conversant    Labs:  Chemistry panel: Recent Labs   Lab Test 06/20/22  1621 04/20/21  1217    139   POTASSIUM 4.0 4.0   CHLORIDE 109 106   CO2 27 32   ANIONGAP 5 1*   GLC 66* 75   BUN 15 11   CR 0.73 0.75   MINERVA 8.8 9.0   GFRESTIMATED >90 86   AST 26 15   ALT 20 19       CBC:   Recent Labs   Lab Test 06/20/22  1621 04/20/21  1217   WBC 4.7 4.9   RBC 4.29 4.51   HGB 12.8 13.9   HCT 39.6 40.8   MCV 92 91   MCH 29.8 30.8   MCHC 32.3 34.1   RDW 12.7 13.1    224       Lipid Panel:  Recent Labs   Lab Test 06/20/22  1621 04/20/21  1217   CHOL 130 150   HDL 78 82   LDL 34 52   TRIG 90 80       Thyroid:   TSH   Date Value Ref Range Status   09/12/2017 1.25 0.40 - 4.00 mU/L Final       ECG today sinus rhythm      Echocardiograms:  8/4/2020  Left ventricular size, global systolic function, and wall motion are normal,  estimated LVEF 60-65%.  Right ventricular global function is normal.  No significant valvular abnormalities.  There are no prior studies available for comparison.    Stress Tests:  6/11/2021 exercise stress echo  Interpretation Summary  1. The patient exercised 9:30min. Above average functional capacity for age.  2. The patient did not exhibit any symptoms during exercise.  3. This was a normal stress EKG with no evidence of  stress-induced ischemia.  4. Normal left ventricular function and wall motion at rest and post-stress.    5/3/2017  Interpretation Summary  The patient exhibited no chest pain during exercise.  The Duke treadmill score was low risk ( >5 Duke score).  This was a normal stress EKG with no evidence of stress-induced ischemia. This  was a normal stress echocardiogram with no evidence of stress-induced  ischemia.    Additional Imagin/2018 CAC scan  1.  No coronary calcifications.  2.  The total Agatston calcium score is 0 placing the patient in the  lowest percentile when compared to age and gender matched control  group.  1. Unchanged 3 mm pulmonary nodule in the right lower lobe from  2017. No further follow-up is recommended per Fleischner Society  criteria.  2. Unchanged fluid density ovoid lesions within the left breast  corresponding with simple cysts as seen on ultrasound from 2017.      Assessment and Plan:   Katie Hernandez is a 63 year old woman with     Intermittent palpitations  Dependent peripheral edema  Agitation from sleep deprivation  Chest discomfort, atypical for angina  CAC score in 2018 was 0    Katie appears to be doing well from a sleep deprivation standpoint with less palpitations and chest discomfort.  I have reassured her that her cardiac function is normal based on her prior assessments and she had no evidence of coronary atherosclerosis based on CAC score in 2018.  It is optional to repeat the CAC score to assess for atherosclerosis.  She agrees to follow-up with a primary care physician for an annual lipid panel.    She was advised to contact us if her palpitations worsened and we will pursue an ambulatory ECG monitoring at that time.      A follow-up with Dr. Oden for considerations of estrogen replacement therapy for postmenopausal symptoms would be reasonable.     She also agrees to contact us if the peripheral edema worsens. A lower extremity ultrasound with Doppler  to assess for venous insufficiency would be advised along with ACE wraps for long distance travel.      Follow-Up  As needed  MyChart message if palpitations, chest discomfort or edema worsens       Mae Etienne MD, MS  Professor of Medicine  Cardiovascular Medicine

## 2023-06-05 ENCOUNTER — OFFICE VISIT (OUTPATIENT)
Dept: CARDIOLOGY | Facility: CLINIC | Age: 63
End: 2023-06-05
Attending: INTERNAL MEDICINE
Payer: COMMERCIAL

## 2023-06-05 VITALS
SYSTOLIC BLOOD PRESSURE: 95 MMHG | BODY MASS INDEX: 23.04 KG/M2 | DIASTOLIC BLOOD PRESSURE: 62 MMHG | HEART RATE: 67 BPM | WEIGHT: 134.2 LBS | OXYGEN SATURATION: 97 %

## 2023-06-05 DIAGNOSIS — R00.2 PALPITATIONS: ICD-10-CM

## 2023-06-05 DIAGNOSIS — R60.0 EDEMA, PERIPHERAL: Primary | ICD-10-CM

## 2023-06-05 PROCEDURE — 93005 ELECTROCARDIOGRAM TRACING: CPT | Mod: RTG

## 2023-06-05 PROCEDURE — 93010 ELECTROCARDIOGRAM REPORT: CPT | Performed by: INTERNAL MEDICINE

## 2023-06-05 PROCEDURE — 99214 OFFICE O/P EST MOD 30 MIN: CPT | Performed by: INTERNAL MEDICINE

## 2023-06-05 PROCEDURE — G0463 HOSPITAL OUTPT CLINIC VISIT: HCPCS | Performed by: INTERNAL MEDICINE

## 2023-06-05 ASSESSMENT — PAIN SCALES - GENERAL: PAINLEVEL: NO PAIN (0)

## 2023-06-05 NOTE — LETTER
"6/5/2023      RE: Katie Hernandez  [address]  [place] [address]       Dear Colleague,    Thank you for the opportunity to participate in the care of your patient, Katie Hernandez, at the [facility] [place] at [facility]. Please see a copy of my visit note below.      History     Ms. Katie Hernandez is 63 year old woman with history of leiomyoma status post hysterectomy, asthma, colitis. She initially established care in 2018 with chest pain that was atypical for angina. She had an exercise stress echo in 2021 that showed no ischemia and exercised for 9.30 minutes.      She was experiencing intermittent palpitations at night, along with mild chest discomfort due to disturbed sleep from having a noisy neighbor at her [facility].  For the last 2 weeks, the noise level has been low. She has been experiencing some sweating and intermittent peripheral edema worse at the end of the day.  She is overall worried that this will negatively impact her heart health. She denies any exertional chest pain or dyspnea.     PAST MEDICAL HISTORY:  Past Medical History:   Diagnosis Date    Abdominal pain 03/2017    ct with long segment of colits descending colon, then colonoscopy and bx by Dr. Dailey and nl    Abnormal EKG 07/2020    nl echo    Abnormal stools 5/30/2013    Agatston coronary artery calcium score less than 100 07/2018    score of 0    Anemia     \"Related to heavy periods\"    Arthritis     Controlled by eliminating dairy.    Breast disorder 2014, 2017    Pain, lump    Complex endometrial hyperplasia with atypia 10/17/2013    Path dx w complete hysterectomy.     H/O colonoscopy 03/2017    nl, bx nl    Hypertension     occasional    Kidney cysts     left seen on CT    Macular degeneration 2015    Taking vitamin.    Menarche     cycles q 28 x 5 d    Menorrhagia 5/16/2007    Oct 2009 ->anemia to 7+ gms.  Resolved with OTC po FE 50 mg/d  "    Postpartum depression     Only for a few days    Precancerous skin lesion     excised -- abdomen. ->yrly Derm cks    S/P total hysterectomy and bilateral salpingo-oophorectomy 10/17/2013    Path: -->Complex endometrial hyperplasia with atypia.     Shingles 03/2020    SOB (shortness of breath) 04/2017    also elevated d dimer - ct no pe, some fibrosis or atelect, breast cysts, then est echo nl 4/17    Swelling of the ankle, feet, or leg     Ankles    Uncomplicated asthma     Childhood. Currently have sensitive airways.    Unspecified hemorrhoids without mention of complication 2007    bleeding to anemia -- hgb 8.4 and lower....    Uterine leiomyoma 5/13/2009    [place] diagnosis while in [place] 8/10/2011 US in this EMR confirms multiple myomas with a 12 mm endometrial stripe.  (Problem list name updated by automated process. Provider to review and confirm*       CURRENT MEDICATIONS:  Current Outpatient Medications   Medication Sig Dispense Refill    calcium carbonate (OS-MINERVA) 1500 (600 Ca) MG tablet Take by mouth 2 times daily (with meals)      co-enzyme Q-10 100 MG CAPS capsule Take by mouth daily      fish oil-omega-3 fatty acids 500 MG capsule       magnesium 100 MG CAPS Take by mouth daily      Multiple Vitamins-Minerals (VITEYES AREDS FORMULA/LUTEIN) CAPS Take 1 tablet by mouth daily         FAMILY HISTORY:  Family History   Problem Relation Age of Onset    Hypertension Mother 45    Eye Disorder Mother         macular degeneration    Psychotic Disorder Mother         Borderline Personality Disorder ([id number])    Anxiety Disorder Mother     Mental Illness Mother     Diabetes Father 75        DMII    Hypertension Father 65    Other Cancer Father         Carcinoma, Melanoma    Cerebrovascular Disease Father     Thyroid Disease Sister         Thyroid cancer    Asthma Brother     Cancer Maternal Grandmother 60        uterine/breast    Uterine Cancer Maternal Grandmother     Breast Cancer Paternal Grandmother 45          age 55    Cancer Maternal Aunt 60        ovarian    Cancer - colorectal Maternal Aunt         possible    Breast Cancer Other     Uterine Cancer Other     Mental Illness Other        SOCIAL HISTORY:  Social History     Tobacco Use    Smoking status: Never    Smokeless tobacco: Never   Substance Use Topics    Alcohol use: Never     Comment: 1/2 d / mo    Drug use: Never       ROS:   10 point ROS negative except HPI    EXAM:  BP 95/62 (BP Location: Right arm, Patient Position: Supine, Cuff Size: Adult Regular)   Pulse 67   Wt 60.9 kg (134 lb 3.2 oz)   LMP 2013   SpO2 97%   BMI 23.04 kg/m      In general, the patient is in no apparent distress.        HEENT: Sclerae white, not injected.    Neck: No JVD. No thyromegaly  Heart: regular with normal S1, S2. No murmur. No audible rub or gallop.    Lungs: Clear bilaterally.  No rhonchi, wheezes, rales.   Extremities: No edema.  The pulses are 2+at the radial bilaterally.  Psych: pleasant and conversant    Labs:  Chemistry panel: Recent Labs   Lab Test 22  1621 21  1217    139   POTASSIUM 4.0 4.0   CHLORIDE 109 106   CO2 27 32   ANIONGAP 5 1*   GLC 66* 75   BUN 15 11   CR 0.73 0.75   MINERVA 8.8 9.0   GFRESTIMATED >90 86   AST 26 15   ALT 20 19       CBC:   Recent Labs   Lab Test 22  1621 21  1217   WBC 4.7 4.9   RBC 4.29 4.51   HGB 12.8 13.9   HCT 39.6 40.8   MCV 92 91   MCH 29.8 30.8   MCHC 32.3 34.1   RDW 12.7 13.1    224       Lipid Panel:  Recent Labs   Lab Test 22  1621 21  1217   CHOL 130 150   HDL 78 82   LDL 34 52   TRIG 90 80       Thyroid:   TSH   Date Value Ref Range Status   2017 1.25 0.40 - 4.00 mU/L Final       ECG today sinus rhythm      Echocardiograms:  2020  Left ventricular size, global systolic function, and wall motion are normal,  estimated LVEF 60-65%.  Right ventricular global function is normal.  No significant valvular abnormalities.  There are no prior studies available for  comparison.    Stress Tests:  2021 exercise stress echo  Interpretation Summary  1. The patient exercised 9:30min. Above average functional capacity for age.  2. The patient did not exhibit any symptoms during exercise.  3. This was a normal stress EKG with no evidence of stress-induced ischemia.  4. Normal left ventricular function and wall motion at rest and post-stress.    5/3/2017  Interpretation Summary  The patient exhibited no chest pain during exercise.  The Duke treadmill score was low risk ( >5 Duke score).  This was a normal stress EKG with no evidence of stress-induced ischemia. This  was a normal stress echocardiogram with no evidence of stress-induced  ischemia.    Additional Imagin/2018 CAC scan  1.  No coronary calcifications.  2.  The total Agatston calcium score is 0 placing the patient in the  lowest percentile when compared to age and gender matched control  group.  1. Unchanged 3 mm pulmonary nodule in the right lower lobe from  2017. No further follow-up is recommended per Fleischner Society  criteria.  2. Unchanged fluid density ovoid lesions within the left breast  corresponding with simple cysts as seen on ultrasound from 2017.      Assessment and Plan:   Katie Hernandez is a 63 year old woman with     Intermittent palpitations  Dependent peripheral edema  Agitation from sleep deprivation  Chest discomfort, atypical for angina  CAC score in 2018 was 0    Katie appears to be doing well from a sleep deprivation standpoint with less palpitations and chest discomfort.  I have reassured her that her cardiac function is normal based on her prior assessments and she had no evidence of coronary atherosclerosis based on CAC score in 2018.  It is optional to repeat the CAC score to assess for atherosclerosis.  She agrees to follow-up with a primary care physician for an annual lipid panel.    She was advised to contact us if her palpitations worsened and we will pursue an  ambulatory ECG monitoring at that time.     A follow-up with Dr. Oden for considerations of estrogen replacement therapy for postmenopausal symptoms would be reasonable.     She also agrees to contact us if the peripheral edema worsens. A lower extremity ultrasound with Doppler to assess for venous insufficiency would be advised along with ACE wraps for long distance travel.      Follow-Up  As needed  MyChart message if palpitations, chest discomfort or edema worsens       Mae Etienne MD, MS  Professor of Medicine  Cardiovascular Medicine

## 2023-06-05 NOTE — NURSING NOTE
Chief Complaint   Patient presents with     Follow Up     Return cardiology           Vitals were taken and medications reconciled.     Mick Hodge, Visit Facilitator    4:32 PM

## 2023-06-06 ENCOUNTER — TRANSFERRED RECORDS (OUTPATIENT)
Dept: FAMILY MEDICINE | Facility: CLINIC | Age: 63
End: 2023-06-06
Payer: COMMERCIAL

## 2023-06-06 NOTE — PATIENT INSTRUCTIONS
"Cardiology Providers you saw during your visit:  Dr. Etienne    Medication changes: None    Follow up: with Dr. Etienne as needed     If you have any questions, contact  Cecil Clayton RN. We are encouraging the use of [employer] to communicate with your HealthCare Provider     To contact the [facility], please call, [phone]  To schedule an appointment or to leave a message for your Care Team Press #1  If you are a physician calling for another physician Press #2  For Billing Press #3  For Medical Records Press #4\"    "

## 2023-06-07 LAB
ATRIAL RATE - MUSE: 68 BPM
DIASTOLIC BLOOD PRESSURE - MUSE: NORMAL MMHG
INTERPRETATION ECG - MUSE: NORMAL
P AXIS - MUSE: 50 DEGREES
PR INTERVAL - MUSE: 146 MS
QRS DURATION - MUSE: 94 MS
QT - MUSE: 438 MS
QTC - MUSE: 465 MS
R AXIS - MUSE: 16 DEGREES
SYSTOLIC BLOOD PRESSURE - MUSE: NORMAL MMHG
T AXIS - MUSE: 24 DEGREES
VENTRICULAR RATE- MUSE: 68 BPM

## 2023-06-07 NOTE — TELEPHONE ENCOUNTER
Pt calling regarding message below. Writer unable to populate Satnam schedule for annual Exam and pt is wanting to specifically see Satnam. Please call pt

## 2023-07-24 ENCOUNTER — OFFICE VISIT (OUTPATIENT)
Dept: OPHTHALMOLOGY | Facility: CLINIC | Age: 63
End: 2023-07-24
Attending: OPHTHALMOLOGY
Payer: COMMERCIAL

## 2023-07-24 DIAGNOSIS — H35.30 MACULAR DEGENERATION OF BOTH EYES, UNSPECIFIED TYPE: Primary | ICD-10-CM

## 2023-07-24 PROCEDURE — G0463 HOSPITAL OUTPT CLINIC VISIT: HCPCS | Performed by: OPHTHALMOLOGY

## 2023-07-24 PROCEDURE — 92015 DETERMINE REFRACTIVE STATE: CPT

## 2023-07-24 PROCEDURE — 99203 OFFICE O/P NEW LOW 30 MIN: CPT | Mod: GC | Performed by: STUDENT IN AN ORGANIZED HEALTH CARE EDUCATION/TRAINING PROGRAM

## 2023-07-24 ASSESSMENT — TONOMETRY
OD_IOP_MMHG: 21
OS_IOP_MMHG: 20
IOP_METHOD: TONOPEN

## 2023-07-24 ASSESSMENT — REFRACTION_MANIFEST
OS_ADD: +2.50
OS_SPHERE: +1.00
OD_SPHERE: +1.75
OD_CYLINDER: SPHERE
OD_ADD: +2.50
OS_CYLINDER: SPHERE

## 2023-07-24 ASSESSMENT — VISUAL ACUITY
OD_CC: 20/50
OS_CC+: -1
OS_CC: 20/30
OD_CC+: -2
CORRECTION_TYPE: GLASSES
METHOD: SNELLEN - LINEAR

## 2023-07-24 ASSESSMENT — CONF VISUAL FIELD
OS_SUPERIOR_TEMPORAL_RESTRICTION: 0
OD_SUPERIOR_TEMPORAL_RESTRICTION: 0
OD_INFERIOR_NASAL_RESTRICTION: 0
OS_INFERIOR_NASAL_RESTRICTION: 0
OS_INFERIOR_TEMPORAL_RESTRICTION: 0
OD_INFERIOR_TEMPORAL_RESTRICTION: 0
OS_NORMAL: 1
OD_SUPERIOR_NASAL_RESTRICTION: 0
OS_SUPERIOR_NASAL_RESTRICTION: 0
OD_NORMAL: 1

## 2023-07-24 ASSESSMENT — REFRACTION_WEARINGRX
OD_SPHERE: +0.75
OD_AXIS: 111
OS_CYLINDER: SPHERE
SPECS_TYPE: PAL
OS_SPHERE: +0.50
OD_ADD: +2.50
OD_CYLINDER: +0.50

## 2023-07-24 ASSESSMENT — CUP TO DISC RATIO
OD_RATIO: 0.3
OS_RATIO: 0.3

## 2023-07-24 ASSESSMENT — EXTERNAL EXAM - RIGHT EYE: OD_EXAM: NORMAL

## 2023-07-24 ASSESSMENT — SLIT LAMP EXAM - LIDS
COMMENTS: MGD
COMMENTS: MGD

## 2023-07-24 ASSESSMENT — EXTERNAL EXAM - LEFT EYE: OS_EXAM: NORMAL

## 2023-07-24 NOTE — PROGRESS NOTES
I have confirmed the patient's and reviewed Past Medical History, Past Surgical History, Social History, Family History, Problem List, Medication List and agree with Tech note.    CC: Here for yearly exam    HPI: Katie Hernandez is 63 year old F who presents for annual visit. She reports that she notices decreased vision in both eyes. Right eye has been steadily declining for years due to macular degeneration. Left eye vision has been decreasing for about 6 months. Last visit with Dr. KARYNA Francis 6/2023 she was told that her AMD left eye was stable however her cataracts had progressed.    Assessment/plan:     1.  Cataract each eye   Possibly visually significant, notices haloes and glares around lights for years, recently increased difficulty with reading  -BAT testing at next visit    2. ABMD  Surface keratopathy  - Recommended artificial tears QID both eyes  - Recommend artificial tear ointment qHS both eyes  - Recommend warm compress twice daily  - Recommended a daily Omega-3/fish oil/flaxseed oil supplement  - If symptoms are severe, consider Punctal plugs, or Restasis  - Return if symptoms worsen or do not improve as anticipated.    3. Dry AMD each eye  -right eye>left eye.   - Previously followed with Dr. Francis, last visit 6/2023 which was reportedly stable since 2021 - in 2021 her right eye developed a PED. She anticipates transferring her care here  -Using AREDS  -Doesn't smoke  -Leafy greens  -Pt refused OCT and photos today as she received them 1 month prior with Dr. Francis who recommended follow-up in 6 months and told her there had been no recent changes. Mentioned we would be unable to look for any changes that would signify conversion to wet ARMD, which could be vision threatening. She mentioned she was okay with this and would consider following up at [facility] in 5 months vs. Continuing with her current retina physician      Return for 5 months for OCT mac, BAT testing, Dilate OU, Optos.    Thank you  for entrusting us with your care  Astrid Sun MD, PGY3  Ophthalmology Resident  [facility]    .  ATTESTATION     Attending Physician Attestation:      Complete documentation of historical and exam elements from today's encounter can be found in the full encounter summary report (not reduplicated in this progress note).  I personally obtained the chief complaint(s) and history of present illness.  I confirmed and edited as necessary the review of systems, past medical/surgical history, family history, social history, and examination findings as documented by others; and I examined the patient myself.  I personally reviewed the relevant tests, images, and reports as documented above.  I personally reviewed the ophthalmic test(s) associated with this encounter, agree with the interpretation(s) as documented by the resident/fellow, and have edited the corresponding report(s) as necessary.   I formulated and edited as necessary the assessment and plan and discussed the findings and management plan with the patient and family    Arnold Teran MD MPH  Vitreoretinal Fellow PGY-6  [facility]

## 2023-07-24 NOTE — NURSING NOTE
Chief Complaints and History of Present Illnesses   Patient presents with    Macular Degeneration Evaluation     Chief Complaint(s) and History of Present Illness(es)       Macular Degeneration Evaluation              Laterality: both eyes    Onset: 1 year ago              Comments    Pt. States she was seen one month ago at retina clinic for AMD. Does plan on transferring care here. VA seems to be worsening BE. No pain BE. Does have dryness BE. No flashes or floaters BE.   Livier Carballo COT 2:43 PM July 24, 2023

## 2023-08-20 ENCOUNTER — HEALTH MAINTENANCE LETTER (OUTPATIENT)
Age: 63
End: 2023-08-20

## 2023-09-28 ENCOUNTER — TELEPHONE (OUTPATIENT)
Dept: DERMATOLOGY | Facility: CLINIC | Age: 63
End: 2023-09-28
Payer: COMMERCIAL

## 2023-09-28 NOTE — TELEPHONE ENCOUNTER
Unable to lvm, Sent a letter informing pt the following has been cancelled:    Appointment type: New  Provider: Dr. Oliveira  Return date: 3/5/24  Specialty phone number: [phone]

## 2023-10-12 ENCOUNTER — TELEPHONE (OUTPATIENT)
Dept: OPHTHALMOLOGY | Facility: CLINIC | Age: 63
End: 2023-10-12
Payer: COMMERCIAL

## 2023-10-12 ENCOUNTER — APPOINTMENT (OUTPATIENT)
Dept: INTERPRETER SERVICES | Facility: CLINIC | Age: 63
End: 2023-10-12
Payer: COMMERCIAL

## 2023-10-12 NOTE — TELEPHONE ENCOUNTER
Called with tab Damon to schedule a tech appointment for next available for glasses severino Wright Communication Facilitator on 10/12/2023 at 4:09 PM

## 2023-10-12 NOTE — TELEPHONE ENCOUNTER
[facility] Call Center    Phone Message    May a detailed message be left on voicemail: yes     Reason for Call: Other: PT states that the last time she was in she was given rx prescription for her eye glasses, pt states the prescription is not adjusting to her eyes and is causing some blurr. Please review and reach out to PT      Action Taken: Message routed to:  [facility] EYE    Travel Screening: Not Applicable

## 2023-10-13 ENCOUNTER — APPOINTMENT (OUTPATIENT)
Dept: INTERPRETER SERVICES | Facility: CLINIC | Age: 63
End: 2023-10-13
Payer: COMMERCIAL

## 2023-10-13 ENCOUNTER — TELEPHONE (OUTPATIENT)
Dept: OPHTHALMOLOGY | Facility: CLINIC | Age: 63
End: 2023-10-13
Payer: COMMERCIAL

## 2023-10-13 NOTE — TELEPHONE ENCOUNTER
I have called twice and not able to reach Katie to schedule a tech appointment for glass rx     Can someone at the front try one more time     Susan Wright Communication Facilitator on 10/13/2023 at 9:24 AM

## 2023-10-16 ENCOUNTER — TELEPHONE (OUTPATIENT)
Dept: OPHTHALMOLOGY | Facility: CLINIC | Age: 63
End: 2023-10-16
Payer: COMMERCIAL

## 2023-10-30 ENCOUNTER — TELEPHONE (OUTPATIENT)
Dept: OPHTHALMOLOGY | Facility: CLINIC | Age: 63
End: 2023-10-30
Payer: COMMERCIAL

## 2023-10-30 NOTE — TELEPHONE ENCOUNTER
[facility] Call Center    Phone Message    May a detailed message be left on voicemail: yes     Reason for Call: Other: Patient calling back to schedule eye tech apt. Patient states she was out of town when we tried calling her and patient is also a teacher so couldn't . Patient would like to come in on Tuesdays and Thursdays after 1:30pm.     Please call patient back at [phone] and if she doesn't  then leave a message with date and time of apt. Thank you.    Action Taken: Message routed to:  [facility] Eye    Travel Screening: Not Applicable

## 2023-10-30 NOTE — TELEPHONE ENCOUNTER
Called and LVM     Scheduled a tech appointment for 10/31 @ 230 pm for glasses recheck     Susan Wright Communication Facilitator on 10/30/2023 at 11:52 AM

## 2023-10-31 ENCOUNTER — ALLIED HEALTH/NURSE VISIT (OUTPATIENT)
Dept: OPHTHALMOLOGY | Facility: CLINIC | Age: 63
End: 2023-10-31
Attending: OPHTHALMOLOGY
Payer: COMMERCIAL

## 2023-10-31 DIAGNOSIS — H35.30 MACULAR DEGENERATION OF BOTH EYES, UNSPECIFIED TYPE: Primary | ICD-10-CM

## 2023-10-31 PROCEDURE — 99207 PR NO CHARGE COORDINATED CARE PS: CPT | Performed by: OPHTHALMOLOGY

## 2023-10-31 PROCEDURE — 92015 DETERMINE REFRACTIVE STATE: CPT

## 2023-10-31 ASSESSMENT — REFRACTION_MANIFEST
OD_SPHERE: +0.50
OS_AXIS: 070
OD_ADD: +2.50
OS_CYLINDER: +0.75
OS_SPHERE: +0.25
OD_CYLINDER: +0.75
OD_AXIS: 135
OS_ADD: +2.50

## 2023-10-31 ASSESSMENT — VISUAL ACUITY
OS_BAT_MED: 20/30
OD_BAT_LOW: 20/60+2
OS_BAT_LOW: 20/25-2
OD_CC+: -2
OS_CC: 20/25
OD_BAT_HIGH: 20/100-1
OS_BAT_HIGH: 20/50+2
OD_BAT_MED: 20/70
OD_CC: 20/70
CORRECTION_TYPE: GLASSES
METHOD: SNELLEN - LINEAR

## 2023-10-31 ASSESSMENT — REFRACTION_WEARINGRX
OS_CYLINDER: SPHERE
OD_CYLINDER: +0.50
OS_ADD: +2.50
OS_SPHERE: +0.75
OD_AXIS: 055
OD_ADD: +2.50
OD_SPHERE: +1.75

## 2023-11-29 DIAGNOSIS — H35.30 MACULAR DEGENERATION OF BOTH EYES, UNSPECIFIED TYPE: Primary | ICD-10-CM

## 2024-02-17 ASSESSMENT — ASTHMA QUESTIONNAIRES
ACT_TOTALSCORE: 25
QUESTION_2 LAST FOUR WEEKS HOW OFTEN HAVE YOU HAD SHORTNESS OF BREATH: NOT AT ALL
QUESTION_4 LAST FOUR WEEKS HOW OFTEN HAVE YOU USED YOUR RESCUE INHALER OR NEBULIZER MEDICATION (SUCH AS ALBUTEROL): NOT AT ALL
QUESTION_1 LAST FOUR WEEKS HOW MUCH OF THE TIME DID YOUR ASTHMA KEEP YOU FROM GETTING AS MUCH DONE AT WORK, SCHOOL OR AT HOME: NONE OF THE TIME
QUESTION_5 LAST FOUR WEEKS HOW WOULD YOU RATE YOUR ASTHMA CONTROL: COMPLETELY CONTROLLED
QUESTION_3 LAST FOUR WEEKS HOW OFTEN DID YOUR ASTHMA SYMPTOMS (WHEEZING, COUGHING, SHORTNESS OF BREATH, CHEST TIGHTNESS OR PAIN) WAKE YOU UP AT NIGHT OR EARLIER THAN USUAL IN THE MORNING: NOT AT ALL

## 2024-02-20 ENCOUNTER — OFFICE VISIT (OUTPATIENT)
Dept: FAMILY MEDICINE | Facility: CLINIC | Age: 64
End: 2024-02-20
Payer: COMMERCIAL

## 2024-02-20 VITALS
SYSTOLIC BLOOD PRESSURE: 100 MMHG | DIASTOLIC BLOOD PRESSURE: 68 MMHG | RESPIRATION RATE: 16 BRPM | HEIGHT: 64 IN | TEMPERATURE: 97.1 F | WEIGHT: 133 LBS | OXYGEN SATURATION: 100 % | BODY MASS INDEX: 22.71 KG/M2 | HEART RATE: 61 BPM

## 2024-02-20 DIAGNOSIS — R10.13 EPIGASTRIC DISCOMFORT: Primary | ICD-10-CM

## 2024-02-20 DIAGNOSIS — K80.20 GALLSTONES: ICD-10-CM

## 2024-02-20 PROCEDURE — 99213 OFFICE O/P EST LOW 20 MIN: CPT | Performed by: INTERNAL MEDICINE

## 2024-02-20 ASSESSMENT — PAIN SCALES - GENERAL: PAINLEVEL: NO PAIN (0)

## 2024-02-20 ASSESSMENT — ASTHMA QUESTIONNAIRES: ACT_TOTALSCORE: 25

## 2024-02-20 NOTE — PROGRESS NOTES
"The patient presents for abdominal discomfort.  She is worried about her gallstones.  She had a CT scan and incidentally gallstones were seen in 2017.  She notes throughout the years about twice a year she can have epigastric discomfort.  Then approximately 3 weeks ago as noted by her MyChart message here she developed more severe discomfort that lasted a bit longer.  This resolved.  Then last night she intentionally ate some fatty food and had epigastric discomfort again.  In the past occasionally it has been lower abdominal discomfort.  With the symptoms she can feel bloated and gassy and nauseated.  Her bowels have been normal.  No fevers or night sweats or unexplained weight changes.  She otherwise is feeling fine and today she feels fine.    Past Medical History:   Diagnosis Date    Abdominal pain 03/2017    ct with long segment of colits descending colon, then colonoscopy and bx by Dr. Dailey and nl    Abnormal EKG 07/2020    nl echo    Abnormal stools 5/30/2013    Agatston coronary artery calcium score less than 100 07/2018    score of 0    Anemia     \"Related to heavy periods\"    Arthritis     Controlled by eliminating dairy.    Breast disorder 2014, 2017    Pain, lump    Complex endometrial hyperplasia with atypia 10/17/2013    Path dx w complete hysterectomy.     H/O colonoscopy 03/2017    nl, bx nl    Hypertension     occasional    Kidney cysts     left seen on CT    Macular degeneration 2015    Taking vitamin.    Menarche     cycles q 28 x 5 d    Menorrhagia 5/16/2007    Oct 2009 ->anemia to 7+ gms.  Resolved with OTC po FE 50 mg/d     Postpartum depression     Only for a few days    Precancerous skin lesion     excised -- abdomen. ->yrly Derm cks    S/P total hysterectomy and bilateral salpingo-oophorectomy 10/17/2013    Path: -->Complex endometrial hyperplasia with atypia.     Shingles 03/2020    SOB (shortness of breath) 04/2017    also elevated d dimer - ct no pe, some fibrosis or atelect, breast " cysts, then est echo nl     Swelling of the ankle, feet, or leg     Ankles    Uncomplicated asthma     Childhood. Currently have sensitive airways.    Unspecified hemorrhoids without mention of complication     bleeding to anemia -- hgb 8.4 and lower....    Uterine leiomyoma 2009    US diagnosis while in [place] 8/10/2011 US in this EMR confirms multiple myomas with a 12 mm endometrial stripe.  (Problem list name updated by automated process. Provider to review and confirm*     Past Surgical History:   Procedure Laterality Date    APPENDECTOMY OPEN  age 3    BIOPSY      uterine, skin     SECTION   and     COLONOSCOPY      virtual    COLONOSCOPY  2013    Procedure: COLONOSCOPY;  COLONOSCOPY;  Surgeon: Db Reed MD;  Location:  GI    COLONOSCOPY N/A 3/14/2017    Procedure: COMBINED COLONOSCOPY, SINGLE OR MULTIPLE BIOPSY/POLYPECTOMY BY BIOPSY;  Surgeon: Db Reed MD;  Location:  GI    DAVINCI HYSTERECTOMY TOTAL, BILATERAL SALPINGO-OOPHORECTOMY, COMBINED  10/17/2013    Procedure: COMBINED DAVINCI HYSTERECTOMY TOTAL, SALPINGO-OOPHORECTOMY;  DaVinci Assisted Total Laparoscopic Hysterectomy/Bilateral Salpingo Oophorectomy Cystoscopy  ;  Surgeon: Zoya Vance MD;  Location: [facility] OR    DILATION AND CURETTAGE, OPERATIVE HYSTEROSCOPY WITH MORCELLATOR, COMBINED  2013    Procedure: COMBINED DILATION AND CURETTAGE, OPERATIVE HYSTEROSCOPY WITH MORCELLATOR;  Dilation and Curettage, Hysteroscopy, Hysteroscopic  Polypectomy with Myosure ;  Surgeon: Concepción Gamboa MD;  Location: [facility] OR    LAPAROSCOPY DIAGNOSTIC (GYN)      ectopic excision    ZZC LAP,SURG,COLECTOMY, PARTIAL, W/ANAST  age 3    tumor in colon as child (removed tumor and appendix)     Social History     Socioeconomic History    Marital status:      Spouse name: Not on file    Number of children: 2    Years of education: Not on file    Highest education level: Not on  file   Occupational History    Occupation: Faculty     Employer: [employer]     Comment: Teaches Translation   Tobacco Use    Smoking status: Never    Smokeless tobacco: Never   Substance and Sexual Activity    Alcohol use: Never     Comment: 1/2 d / mo    Drug use: Never    Sexual activity: Yes     Partners: Male     Birth control/protection: Post-menopausal     Comment: Vastectomy   Other Topics Concern     Service Not Asked    Blood Transfusions No    Caffeine Concern No     Comment: Stopped all caffiene 2nd to breast pain    Occupational Exposure No    Hobby Hazards No    Sleep Concern No    Stress Concern No    Weight Concern No    Special Diet No    Back Care No    Exercise No     Comment: 45' aerobic 3xs/wk    Bike Helmet No    Seat Belt No    Self-Exams Not Asked    Parent/sibling w/ CABG, MI or angioplasty before 65F 55M? No   Social History Narrative    Not on file     Social Determinants of Health     Financial Resource Strain: Not on file   Food Insecurity: Not on file   Transportation Needs: Not on file   Physical Activity: Inactive (12/14/2018)    Exercise Vital Sign     Days of Exercise per Week: 0 days     Minutes of Exercise per Session: 0 min   Stress: Stress Concern Present (12/14/2018)    [employer] - Occupational Stress Questionnaire     Feeling of Stress : Very much   Social Connections: Moderately Integrated (12/14/2018)    Social Connection and Isolation Panel [NHANES]     Frequency of Communication with Friends and Family: Once a week     Frequency of Social Gatherings with Friends and Family: Three times a week     Attends [employer]ism Services: Never     Active Member of Clubs or Organizations: Yes     Attends Club or Organization Meetings: 1 to 4 times per year     Marital Status:    Interpersonal Safety: Not on file   Housing Stability: Not on file     Current Outpatient Medications   Medication Sig Dispense Refill    calcium carbonate (OS-MINERVA) 1500  "(600 Ca) MG tablet Take by mouth 2 times daily (with meals)      co-enzyme Q-10 100 MG CAPS capsule Take by mouth daily      fish oil-omega-3 fatty acids 500 MG capsule       magnesium 100 MG CAPS Take by mouth daily      Multiple Vitamins-Minerals (VITEYES AREDS FORMULA/LUTEIN) CAPS Take 1 tablet by mouth daily       Allergies   Allergen Reactions    Hydrocodone Other (See Comments)     Pt fainted    Penicillins Hives    Chlorhexidine Gluconate      Itching after preop washing.    Doxycycline Other (See Comments)     Feeling of bugs crawling on skin and tinnitus     FAMILY HISTORY NOTED AND REVIEWED    REVIEW OF SYSTEMS: above    PHYSICAL EXAM    /68 (BP Location: Right arm, Patient Position: Sitting, Cuff Size: Adult Regular)   Pulse 61   Temp 97.1  F (36.2  C) (Temporal)   Resp 16   Ht 1.626 m (5' 4\")   Wt 60.3 kg (133 lb)   LMP 08/20/2013   SpO2 100%   BMI 22.83 kg/m      Patient appears non toxic  Abdomen normal active bowel sounds, soft non-tender, no mgr, no hepatosplenomegaly    Ruq us ordered    ASSESSMENT:  Recurrent GI discomfort with bloating and gas, certainly could be gallstones.  At times she feels it on either side of her back as well.  I will start with a right upper quadrant ultrasound.  If that is negative consider CT.  I think colon is unlikely to be the cause of this.  I doubt a malignant cause.    PLAN:  Above    Mahesh Santoyo M.D.        "

## 2024-02-27 ENCOUNTER — HOSPITAL ENCOUNTER (OUTPATIENT)
Dept: ULTRASOUND IMAGING | Facility: HOSPITAL | Age: 64
Discharge: HOME | End: 2024-02-27
Attending: INTERNAL MEDICINE | Admitting: INTERNAL MEDICINE
Payer: COMMERCIAL

## 2024-02-27 DIAGNOSIS — R10.13 EPIGASTRIC DISCOMFORT: ICD-10-CM

## 2024-02-27 PROCEDURE — 76705 ECHO EXAM OF ABDOMEN: CPT

## 2024-02-27 NOTE — RESULT ENCOUNTER NOTE
Good morning,    Indeed you do have gallstones with gallbladder wall thickening which is all suggestive that your symptoms are related to the gallstones.  I would like you to see a surgeon for this and you will likely need surgery.  If you ever develop severe pain that will not go away you should go to the emergency room.    I will put in a referral for the surgeon.  You can call there as well at [phone].  It is a [facility] and they are very good so whoever can see you first would be fine.    Please let me know if you have questions.    Mahesh Santoyo M.D.

## 2024-03-07 ENCOUNTER — OFFICE VISIT (OUTPATIENT)
Dept: SURGERY | Facility: CLINIC | Age: 64
End: 2024-03-07
Payer: COMMERCIAL

## 2024-03-07 VITALS
HEART RATE: 66 BPM | DIASTOLIC BLOOD PRESSURE: 62 MMHG | BODY MASS INDEX: 22.71 KG/M2 | OXYGEN SATURATION: 100 % | SYSTOLIC BLOOD PRESSURE: 104 MMHG | WEIGHT: 133 LBS | HEIGHT: 64 IN

## 2024-03-07 DIAGNOSIS — K80.20 CALCULUS OF GALLBLADDER WITHOUT CHOLECYSTITIS WITHOUT OBSTRUCTION: Primary | ICD-10-CM

## 2024-03-07 PROCEDURE — 99204 OFFICE O/P NEW MOD 45 MIN: CPT | Performed by: SURGERY

## 2024-03-07 NOTE — LETTER
March 21, 2024          Mahesh Santoyo MD  [address]  [place] [address]      RE:   Katie Hernandez 1960      Dear Colleague,    Thank you for referring your patient, Katie Hernandez, to [facility] [place] location. Please see a copy of my visit note below.    Patient is a 63-year-old female referred by the above provider for consultation regarding gallstones.  She reports that she was incidentally noted as having gallstones many years ago on a CT scan.  However recently she has been experiencing discomfort in the upper abdomen.  She also reports intermittent symptoms in the interval since the stones were identified as being primarily epigastric pain.  Sometimes this is been more severe and lasted slightly longer but they have always subsided with time.  She has noted that this has been typically associated with fatty foods.  She has had no fevers or chills.  She has reported no nausea or vomiting except for when she has had discomfort.  No tea colored urine or pale stools.  Recent ultrasound again confirmed the presence of gallstones.  Gallbladder wall was 3.4 mm in thickness.     PMH:   has a past medical history of Abdominal pain (03/2017), Abnormal EKG (07/2020), Abnormal stools (5/30/2013), Agatston coronary artery calcium score less than 100 (07/2018), Anemia, Arthritis, Breast disorder (2014, 2017), Complex endometrial hyperplasia with atypia (10/17/2013), H/O colonoscopy (03/2017), Hypertension, Kidney cysts, Macular degeneration (2015), Menarche, Menorrhagia (5/16/2007), Postpartum depression, Precancerous skin lesion, S/P total hysterectomy and bilateral salpingo-oophorectomy (10/17/2013), Shingles (03/2020), SOB (shortness of breath) (04/2017), Swelling of the ankle, feet, or leg, Uncomplicated asthma, Unspecified hemorrhoids without mention of complication (2007), and Uterine leiomyoma (5/13/2009).  PSH:    has a past surgical history that includes  "colonoscopy (); Appendectomy open (age 3);  section ( and ); LAP,SURG,COLECTOMY, PARTIAL, W/ANAST (age 3); Laparoscopy diagnostic (gyn) (); Colonoscopy (2013); Dilation and curettage, operative hysteroscopy with morcellator, combined (2013); DaVINCI hysterectomy total, bilateral salpingo-oophorectomy, combined (10/17/2013); Colonoscopy (N/A, 3/14/2017); and biopsy ().  Social History:   reports that she has never smoked. She has never used smokeless tobacco. She reports that she does not drink alcohol and does not use drugs.  Family History:  family history includes Anxiety Disorder in her mother; Asthma in her brother; Breast Cancer in an other family member; Breast Cancer (age of onset: 45) in her paternal grandmother; Cancer (age of onset: 60) in her maternal aunt and maternal grandmother; Cancer - colorectal in her maternal aunt; Cerebrovascular Disease in her father; Diabetes (age of onset: 75) in her father; Eye Disorder in her mother; Hypertension (age of onset: 45) in her mother; Hypertension (age of onset: 65) in her father; Mental Illness in her mother and another family member; Other Cancer in her father; Psychotic Disorder in her mother; Thyroid Disease in her sister; Uterine Cancer in her maternal grandmother and another family member.  Medications/Allergies: Home medications and allergies reviewed.     ROS:  The 10 point Review of Systems is negative other than noted in the HPI.     Physical Exam:  /62   Pulse 66   Ht 1.626 m (5' 4\")   Wt 60.3 kg (133 lb)   LMP 2013   SpO2 100%   BMI 22.83 kg/m    GENERAL: Generally appears well.  Psych: Alert and Oriented.  Normal affect  Eyes: Sclera clear  Respiratory:  Lungs clear to ausculation bilaterally with good air excursion  Cardiovascular:  Regular Rate and Rhythm with no murmurs gallops or rubs, normal peripheral pulses  GI: Abdomen Non Distended Soft Non-Tender  No hernias " palpated.  Lymphatic/Hematologic/Immune:  No femoral or cervical lymphadenopathy.  Integumentary:  No rashes  Neurological: grossly intact     Ultrasound shows Cholelithiasis No pericholecystic fluid Ducts Measures 5MM  All new lab and imaging data was reviewed.      Impression and Plan:  Patient is a 63 year old female with cholelithiasis.     PLAN:   I discussed the pathophysiology of gallbladder disease and complications of cholecystitis and choledocholithiasis with the patient.  Recommend cholecystectomy at a date of her earliest convenience.  I also discussed the risks associated with the procedure including, but not limited to infection, bleeding, conversion to open, bile leak, bile duct injury, retained gallstones, pneumonia, MI, and anesthesia complications with the patient.  I also discussed if a complication did occur it may require further surgical intervention during or after the procedure. The patient indicated understanding of the discussion, asked appropriate questions, and provided consent. I provided the patient an information pamphlet. I have recommended a low fat diet and instructed the patient to go to ER if they developed persistent pain, persistent nausea and vomiting, or yellowness of skin.    Again, thank you for allowing me to participate in the care of your patient.      Sincerely,      Pérez Last MD

## 2024-03-21 ENCOUNTER — TELEPHONE (OUTPATIENT)
Dept: SURGERY | Facility: CLINIC | Age: 64
End: 2024-03-21
Payer: COMMERCIAL

## 2024-03-21 NOTE — PROGRESS NOTES
[facility]  Surgery Consultation    PCP:  Mahesh Santoyo [phone]    HPI: Patient is a 63-year-old female referred by the above provider for consultation regarding gallstones.  She reports that she was incidentally noted as having gallstones many years ago on a CT scan.  However recently she has been experiencing discomfort in the upper abdomen.  She also reports intermittent symptoms in the interval since the stones were identified as being primarily epigastric pain.  Sometimes this is been more severe and lasted slightly longer but they have always subsided with time.  She has noted that this has been typically associated with fatty foods.  She has had no fevers or chills.  She has reported no nausea or vomiting except for when she has had discomfort.  No tea colored urine or pale stools.  Recent ultrasound again confirmed the presence of gallstones.  Gallbladder wall was 3.4 mm in thickness.    PMH:   has a past medical history of Abdominal pain (2017), Abnormal EKG (2020), Abnormal stools (2013), Agatston coronary artery calcium score less than 100 (2018), Anemia, Arthritis, Breast disorder (, ), Complex endometrial hyperplasia with atypia (10/17/2013), H/O colonoscopy (2017), Hypertension, Kidney cysts, Macular degeneration (), Menarche, Menorrhagia (2007), Postpartum depression, Precancerous skin lesion, S/P total hysterectomy and bilateral salpingo-oophorectomy (10/17/2013), Shingles (2020), SOB (shortness of breath) (2017), Swelling of the ankle, feet, or leg, Uncomplicated asthma, Unspecified hemorrhoids without mention of complication (), and Uterine leiomyoma (2009).  PSH:    has a past surgical history that includes colonoscopy (); Appendectomy open (age 3);  section ( and ); LAP,SURG,COLECTOMY, PARTIAL, W/ANAST (age 3); Laparoscopy diagnostic (gyn) (); Colonoscopy (2013); Dilation and curettage,  "operative hysteroscopy with morcellator, combined (9/20/2013); DaVINCI hysterectomy total, bilateral salpingo-oophorectomy, combined (10/17/2013); Colonoscopy (N/A, 3/14/2017); and biopsy (2013).  Social History:   reports that she has never smoked. She has never used smokeless tobacco. She reports that she does not drink alcohol and does not use drugs.  Family History:  family history includes Anxiety Disorder in her mother; Asthma in her brother; Breast Cancer in an other family member; Breast Cancer (age of onset: 45) in her paternal grandmother; Cancer (age of onset: 60) in her maternal aunt and maternal grandmother; Cancer - colorectal in her maternal aunt; Cerebrovascular Disease in her father; Diabetes (age of onset: 75) in her father; Eye Disorder in her mother; Hypertension (age of onset: 45) in her mother; Hypertension (age of onset: 65) in her father; Mental Illness in her mother and another family member; Other Cancer in her father; Psychotic Disorder in her mother; Thyroid Disease in her sister; Uterine Cancer in her maternal grandmother and another family member.  Medications/Allergies: Home medications and allergies reviewed.    ROS:  The 10 point Review of Systems is negative other than noted in the HPI.    Physical Exam:  /62   Pulse 66   Ht 1.626 m (5' 4\")   Wt 60.3 kg (133 lb)   LMP 08/20/2013   SpO2 100%   BMI 22.83 kg/m    GENERAL: Generally appears well.  Psych: Alert and Oriented.  Normal affect  Eyes: Sclera clear  Respiratory:  Lungs clear to ausculation bilaterally with good air excursion  Cardiovascular:  Regular Rate and Rhythm with no murmurs gallops or rubs, normal peripheral pulses  GI: Abdomen Non Distended Soft Non-Tender  No hernias palpated.  Lymphatic/Hematologic/Immune:  No femoral or cervical lymphadenopathy.  Integumentary:  No rashes  Neurological: grossly intact    Ultrasound shows Cholelithiasis No pericholecystic fluid Ducts Measures 5MM  All new lab and imaging " data was reviewed.     Impression and Plan:  Patient is a 63 year old female with cholelithiasis.    PLAN:   I discussed the pathophysiology of gallbladder disease and complications of cholecystitis and choledocholithiasis with the patient.  Recommend cholecystectomy at a date of her earliest convenience.  I also discussed the risks associated with the procedure including, but not limited to infection, bleeding, conversion to open, bile leak, bile duct injury, retained gallstones, pneumonia, MI, and anesthesia complications with the patient.  I also discussed if a complication did occur it may require further surgical intervention during or after the procedure. The patient indicated understanding of the discussion, asked appropriate questions, and provided consent. I provided the patient an information pamphlet. I have recommended a low fat diet and instructed the patient to go to ER if they developed persistent pain, persistent nausea and vomiting, or yellowness of skin.      Thank you very much for this consult.    Pérez Last M.D.  [facility]  [phone]    Please route or send letter to:  Primary Care Provider (PCP) and Referring Provider

## 2024-03-21 NOTE — TELEPHONE ENCOUNTER
Orders received for lap nikhil with Dr. Pérez Last.       Left message for patient to call me at their convenience to schedule surgery.     Sarah JAIN    Surgery Coordinator  [facility]  [facility]  [phone]

## 2024-03-28 ENCOUNTER — TRANSFERRED RECORDS (OUTPATIENT)
Dept: HEALTH INFORMATION MANAGEMENT | Facility: CLINIC | Age: 64
End: 2024-03-28
Payer: COMMERCIAL

## 2024-06-05 ENCOUNTER — TRANSCRIBE ORDERS (OUTPATIENT)
Dept: GASTROENTEROLOGY | Facility: CLINIC | Age: 64
End: 2024-06-05
Payer: COMMERCIAL

## 2024-06-05 ENCOUNTER — HOSPITAL ENCOUNTER (OUTPATIENT)
Facility: CLINIC | Age: 64
End: 2024-06-05
Attending: COLON & RECTAL SURGERY | Admitting: COLON & RECTAL SURGERY
Payer: COMMERCIAL

## 2024-06-05 ENCOUNTER — TELEPHONE (OUTPATIENT)
Dept: GASTROENTEROLOGY | Facility: CLINIC | Age: 64
End: 2024-06-05
Payer: COMMERCIAL

## 2024-06-05 DIAGNOSIS — Z12.11 SCREENING FOR COLON CANCER: Primary | ICD-10-CM

## 2024-06-05 NOTE — TELEPHONE ENCOUNTER
REMINDER: We do not accept [employer] insurance.      Procedure Scheduled: Lower Endoscopy [Colonoscopy]  Procedure Date: 6/24/2024 @ 8:15AM  [address]; [address] [place] [address]  Endoscopist: MÓNICA  Ordering Provider: MÓNICA  [facility] (per the direction of[facility]

## 2024-06-28 ENCOUNTER — HOSPITAL ENCOUNTER (OUTPATIENT)
Facility: CLINIC | Age: 64
End: 2024-06-28
Attending: SURGERY | Admitting: SURGERY
Payer: COMMERCIAL

## 2024-06-28 NOTE — TELEPHONE ENCOUNTER
REMINDER: We do not accept [employer] insurance.      Procedure Scheduled: Lower Endoscopy [Colonoscopy]  Procedure Date: 8/12/2024  Site:[facility]; [address] [place] [address]  Endoscopist:   Ordering Provider:   Scheduled by (per the direction of): Updated fax from [facility].

## 2024-07-08 NOTE — TELEPHONE ENCOUNTER
Fredy from [facility] called to ask us to remove her for 8/12/24 with /patient will call them back to get put on the schedules for the winter time when available

## 2024-08-04 ENCOUNTER — HEALTH MAINTENANCE LETTER (OUTPATIENT)
Age: 64
End: 2024-08-04

## 2024-09-09 ENCOUNTER — TELEPHONE (OUTPATIENT)
Dept: CARDIOLOGY | Facility: CLINIC | Age: 64
End: 2024-09-09
Payer: COMMERCIAL

## 2024-09-09 NOTE — TELEPHONE ENCOUNTER
9/9 spoke to pt and attempted to schedule an NAILA visit  per nurse that stated that there was a sooner appt for 9/12 (Thursday w/ Lozano )   Adv pt that there's no sooner appts w/ provider and apologized for the inconvenience

## 2024-09-11 ENCOUNTER — TELEPHONE (OUTPATIENT)
Dept: CARDIOLOGY | Facility: CLINIC | Age: 64
End: 2024-09-11
Payer: COMMERCIAL

## 2024-09-11 NOTE — TELEPHONE ENCOUNTER
9/11 Left Voicemail (1st Attempt) and Sent Mychart (1st Attempt) for the patient to call back and schedule the following:    Appointment type: Return Cardiology  Provider: Alexy  Return date: offer sooner appt on 9/17 @7:45 am at   Specialty phone number: [phone] OPT 1  Additional appointment(s) needed: N/A  Additonal Notes: pt wants to be seen sooner slots been held

## 2024-09-13 ENCOUNTER — TELEPHONE (OUTPATIENT)
Dept: CARDIOLOGY | Facility: CLINIC | Age: 64
End: 2024-09-13
Payer: COMMERCIAL

## 2024-09-13 NOTE — TELEPHONE ENCOUNTER
Left Voicemail (1st Attempt) and Sent Mychart (1st Attempt) for the patient to call back and schedule the following:    Appointment type: Return Cardiology  Provider: Alexy  Return date: 9/17/24  Specialty phone number: [phone] opt 1  Additional appointment(s) needed: N/A  Additonal Notes: LVM and sent MYC for pt to consider sooner appt on 9/17 (appt on hold for pt) instead of 10/10 JF

## 2024-09-16 ENCOUNTER — TELEPHONE (OUTPATIENT)
Dept: CARDIOLOGY | Facility: CLINIC | Age: 64
End: 2024-09-16
Payer: COMMERCIAL

## 2024-09-16 NOTE — TELEPHONE ENCOUNTER
9/16/2024 4:54PM Mildred Ortega  Called waitlist patient and offered a Return Cardiology appointment with JOCELYN Luke CNP on this date 9/24, 10/1, or 10/8 & time (any open times) at this location [address], [place] [address].    9/16 LVM and MYC to offer sooner Return Cardiology w/ Radha Tracey in [place] 9/24, 10/1, and 10/8. MJ       Please note there is no guarantee this appointment will be available as it is first come first serve.   Mildred Otrega 9/16/2024 4:54PM

## 2024-09-30 NOTE — PROGRESS NOTES
"         General Cardiology Clinic - [place]      HPI: Ms. Katie Hernandez is a 64 year old female presenting to cardiology clinic today for syncope follow up. PMH significant for leiomyoma s/p hysterectomy, asthma, colitis.    Patient had COVID earlier in the summer (approx. 2 months ago) accompanied by a fainting spell. She saw a doctor at that time, who recommended aggressive hydration in setting of recent illness. She reports she is still feeling fatigued and sometimes SOB, but all of her symptoms have slowly improved. She reports a remote history of asthma and does not use any inhalers at this time. She does report that she feels fluttering in her heart, usually at nighttime. She also reports she may have symptoms of fluttering or feeling dizzy after drinking coffee then going for a walk. She does state she does not feel likes she is hydrating well.    Patient does state she and her  have had disruptive neighbors for the past 3 years, so she does not sleep well. She is frequently fatigued from being woken up multiple times a night.    Current Cardiac Medications:  none      PAST MEDICAL HISTORY:  Past Medical History:   Diagnosis Date    Abdominal pain 03/2017    ct with long segment of colits descending colon, then colonoscopy and bx by Dr. Dailey and nl    Abnormal EKG 07/2020    nl echo    Abnormal stools 05/30/2013    Agatston coronary artery calcium score less than 100 07/2018    score of 0    Anemia     \"Related to heavy periods\"    Arthritis     Controlled by eliminating dairy.    Breast disorder 2014, 2017    Pain, lump    Complex endometrial hyperplasia with atypia 10/17/2013    Path dx w complete hysterectomy.     H/O colonoscopy 03/2017    nl, bx nl    Hypertension     occasional    Kidney cysts     left seen on CT    Macular degeneration 2015    Taking vitamin.    Menarche     cycles q 28 x 5 d    Menorrhagia 05/16/2007    Oct 2009 ->anemia to 7+ gms.  Resolved with OTC po FE 50 mg/d     " Postpartum depression     Only for a few days    Precancerous skin lesion     excised -- abdomen. ->yrly Derm cks    S/P total hysterectomy and bilateral salpingo-oophorectomy 10/17/2013    Path: -->Complex endometrial hyperplasia with atypia.     Shingles 2020    SOB (shortness of breath) 2017    also elevated d dimer - ct no pe, some fibrosis or atelect, breast cysts, then est echo nl     Swelling of the ankle, feet, or leg     Ankles    Uncomplicated asthma     Childhood. Currently have sensitive airways.    Unspecified hemorrhoids without mention of complication     bleeding to anemia -- hgb 8.4 and lower....    Uterine leiomyoma 2009    [place] diagnosis while in [place] 8/10/2011 US in this EMR confirms multiple myomas with a 12 mm endometrial stripe.  (Problem list name updated by automated process. Provider to review and confirm*       CURRENT MEDICATIONS:  Current Outpatient Medications   Medication Sig Dispense Refill    calcium carbonate (OS-MINERVA) 1500 (600 Ca) MG tablet Take by mouth 2 times daily (with meals)      co-enzyme Q-10 100 MG CAPS capsule Take by mouth daily      fish oil-omega-3 fatty acids 500 MG capsule       magnesium 100 MG CAPS Take by mouth daily      Multiple Vitamins-Minerals (VITEYES AREDS FORMULA/LUTEIN) CAPS Take 1 tablet by mouth daily         PAST SURGICAL HISTORY:  Past Surgical History:   Procedure Laterality Date    APPENDECTOMY OPEN  age 3    BIOPSY  2013    uterine, skin     SECTION   and     COLONOSCOPY      virtual    COLONOSCOPY  2013    Procedure: COLONOSCOPY;  COLONOSCOPY;  Surgeon: Db Reed MD;  Location:  GI    COLONOSCOPY N/A 3/14/2017    Procedure: COMBINED COLONOSCOPY, SINGLE OR MULTIPLE BIOPSY/POLYPECTOMY BY BIOPSY;  Surgeon: Db Reed MD;  Location: [facility]    DAVINCI HYSTERECTOMY TOTAL, BILATERAL SALPINGO-OOPHORECTOMY, COMBINED  10/17/2013    Procedure: COMBINED DAVINCI HYSTERECTOMY TOTAL,  SALPINGO-OOPHORECTOMY;  DaVinci Assisted Total Laparoscopic Hysterectomy/Bilateral Salpingo Oophorectomy Cystoscopy  ;  Surgeon: Zoya Vance MD;  Location: [facility]    DILATION AND CURETTAGE, OPERATIVE HYSTEROSCOPY WITH MORCELLATOR, COMBINED  2013    Procedure: COMBINED DILATION AND CURETTAGE, OPERATIVE HYSTEROSCOPY WITH MORCELLATOR;  Dilation and Curettage, Hysteroscopy, Hysteroscopic  Polypectomy with Myosure ;  Surgeon: Concepción Gamboa MD;  Location: [facility] OR    LAPAROSCOPY DIAGNOSTIC (GYN)      ectopic excision    ZZC LAP,SURG,COLECTOMY, PARTIAL, W/ANAST  age 3    tumor in colon as child (removed tumor and appendix)       ALLERGIES:  Allergies   Allergen Reactions    Hydrocodone Other (See Comments)     Pt fainted    Penicillins Hives    Chlorhexidine Gluconate      Itching after preop washing.    Doxycycline Other (See Comments)     Feeling of bugs crawling on skin and tinnitus       FAMILY HISTORY:  Family History   Problem Relation Age of Onset    Hypertension Mother 45    Eye Disorder Mother         macular degeneration    Psychotic Disorder Mother         Borderline Personality Disorder ([id number])    Anxiety Disorder Mother     Mental Illness Mother     Diabetes Father 75        DMII    Hypertension Father 65    Other Cancer Father         Carcinoma, Melanoma    Cerebrovascular Disease Father     Cancer Maternal Grandmother 60        uterine/breast    Uterine Cancer Maternal Grandmother     Breast Cancer Paternal Grandmother 45         age 55    Asthma Brother     Thyroid Disease Sister         Thyroid cancer    Cancer Maternal Aunt 60        ovarian    Cancer - colorectal Maternal Aunt         possible    Breast Cancer Other     Uterine Cancer Other     Mental Illness Other     Colon Cancer No family hx of        SOCIAL HISTORY:  Social History     Tobacco Use    Smoking status: Never    Smokeless tobacco: Never   Substance Use Topics    Alcohol use: Never     Comment: 1/2 d / mo    Drug  "use: Never       ROS:   Constitutional: No fever, chills, or sweats.  Cardiovascular: As per HPI.       Exam:  /73 (BP Location: Left arm, Patient Position: Chair, Cuff Size: Adult Regular)   Pulse 70   Wt 60.8 kg (134 lb)   LMP 08/20/2013   SpO2 99%   BMI 23.00 kg/m     GENERAL: alert and no distress  RESPIRATORY: lungs clear to auscultation - no rales, rhonchi or wheezes, no use of accessory muscles, respirations are unlabored, normal respiratory rate  CARDIOVASCULAR: RRR, +S1S2, no murmur, rub, or gallop.   GI: soft, non tender  EXTREMITIES: peripheral pulses normal, no peripheral edema  NEURO: alert, normal speech and affect  SKIN: no jaundice, no rashes or lesions      Labs:  Lab Results   Component Value Date    WBC 4.7 06/20/2022    WBC 4.9 04/20/2021    RBC 4.29 06/20/2022    RBC 4.51 04/20/2021    HGB 12.8 06/20/2022    HGB 13.9 04/20/2021    HCT 39.6 06/20/2022    HCT 40.8 04/20/2021    MCV 92 06/20/2022    MCV 91 04/20/2021    MCH 29.8 06/20/2022    MCH 30.8 04/20/2021    MCHC 32.3 06/20/2022    MCHC 34.1 04/20/2021    RDW 12.7 06/20/2022    RDW 13.1 04/20/2021     06/20/2022     04/20/2021       Lab Results   Component Value Date     06/20/2022     04/20/2021    POTASSIUM 4.0 06/20/2022    POTASSIUM 4.0 04/20/2021    CHLORIDE 109 06/20/2022    CHLORIDE 106 04/20/2021    CO2 27 06/20/2022    CO2 32 04/20/2021    ANIONGAP 5 06/20/2022    ANIONGAP 1 (L) 04/20/2021    GLC 66 (L) 06/20/2022    GLC 75 04/20/2021    BUN 15 06/20/2022    BUN 11 04/20/2021    CR 0.73 06/20/2022    CR 0.75 04/20/2021    GFRESTIMATED >90 06/20/2022    GFRESTIMATED 86 04/20/2021    GFRESTBLACK >90 04/20/2021    MINERVA 8.8 06/20/2022    MINERVA 9.0 04/20/2021        No results found for: \"INR\"    No results found for: \"NTBNP\"    Lab Results   Component Value Date    CHOL 130 06/20/2022    CHOL 150 04/20/2021     Lab Results   Component Value Date    HDL 78 06/20/2022    HDL 82 04/20/2021     Lab Results "   Component Value Date    LDL 34 06/20/2022    LDL 52 04/20/2021     Lab Results   Component Value Date    TRIG 90 06/20/2022    TRIG 80 04/20/2021     Lab Results   Component Value Date    CHOLHDLRATIO 1.7 12/18/2013       Diagnostics  EKG 6/5/23      Stress Echocardiogram 2021  Interpretation Summary  1. The patient exercised 9:30min. Above average functional capacity for age.  2. The patient did not exhibit any symptoms during exercise.  3. This was a normal stress EKG with no evidence of stress-induced ischemia.  4. Normal left ventricular function and wall motion at rest and post-stress.  ______________________________________________________________________________  Stress  The patient exercised 9:30min.  There was a normal BP response to exercise.  The patient did not exhibit any symptoms during exercise.  Exercise was stopped due to fatigue.  A treadmill exercise test according to the Jd protocol was performed.  Target Heart Rate was achieved.  This was a normal stress EKG with no evidence of stress-induced ischemia.  Normal left ventricular function and wall motion at rest and post-stress.     Baseline  Resting ECG is normal.  Normal left ventricular function and wall motion at rest and post-stress.     Stress Results             Protocol:  Jd          Maximum Predicted HR:   160 bpm             Target HR: 136 bpm        % Maximum Predicted HR: 96 %        Stage  DurationHeart Rate  BP                    Comment            (mm:ss)   (bpm)     Jd    3:00      90    102/70    Stage 2   3:00     113    112/70    Stage 3   3:00     139    118/70    Stage 4   0:30     153      /   FAC Above Average; Alberto Score 9; RPP 18,054   RecoveryR  4:00      92    110/70            Stress Duration:   9:30 mm:ss *        Recovery Time: 4:00 mm:ss         Maximum Stress HR: 153 bpm *           METS:          11     Left Ventricle  Left ventricular systolic function is normal. The visual ejection fraction  is  estimated at 55-60%.     Tricuspid Valve  The tricuspid valve is normal in structure and function. There is trace  tricuspid regurgitation.     Aortic Valve  The aortic valve is normal in structure and function.     Pulmonic Valve  The pulmonic valve is normal in structure and function.     Pericardium  There is no pericardial effusion.    CT Calcium 7/17/18  IMPRESSIONS:  1.  No coronary calcifications.  2.  The total Agatston calcium score is 0 placing the patient in the  lowest percentile when compared to age and gender matched control  group.  3.  Please review Radiology report for incidental noncardiac findings  that will follow separately.      CORONARY ARTERY CALCIUM SCORES:   Total calcium score: 0  Left main coronary artery: 0  Left anterior descending coronary artery: 0  Circumflex coronary artery: 0  Right coronary artery: 0        Assessment and Plan:   Ms. Hernandez is a 64 year old female with a PMH of leiomyoma s/p hysterectomy, asthma, colitis.    # Syncope in setting of COVID  # Palpitations  - Zio patch for 7 days  - Encourage continued hydration  - Encourage positive sleep hygiene as able      Follow up:  - to be determined pending Zio patch results      JOCELYN Luke, LAYNEP-C  [facility] General Cardiology  Securely message with [employer]   Text page via [facility] Paging/Directory          Chart review time: 7 minutes  Visit time: 20 minutes  Chart completion/documentation: 5 minutes  Total time spent: 32 minutes

## 2024-10-01 ENCOUNTER — OFFICE VISIT (OUTPATIENT)
Dept: CARDIOLOGY | Facility: CLINIC | Age: 64
End: 2024-10-01
Payer: COMMERCIAL

## 2024-10-01 VITALS
BODY MASS INDEX: 23 KG/M2 | SYSTOLIC BLOOD PRESSURE: 120 MMHG | OXYGEN SATURATION: 99 % | HEART RATE: 70 BPM | DIASTOLIC BLOOD PRESSURE: 73 MMHG | WEIGHT: 134 LBS

## 2024-10-01 DIAGNOSIS — R55 SYNCOPE, UNSPECIFIED SYNCOPE TYPE: ICD-10-CM

## 2024-10-01 DIAGNOSIS — R00.2 PALPITATIONS: Primary | ICD-10-CM

## 2024-10-01 PROCEDURE — 93242 EXT ECG>48HR<7D RECORDING: CPT | Performed by: INTERNAL MEDICINE

## 2024-10-01 PROCEDURE — 99214 OFFICE O/P EST MOD 30 MIN: CPT | Mod: 25

## 2024-10-01 NOTE — LETTER
"10/1/2024      RE: Katie Hernandez  [address]  [place] [address]       Dear Colleague,    Thank you for the opportunity to participate in the care of your patient, Katie Hernandez, at the [facility]. Please see a copy of my visit note below.             General Cardiology Clinic - [place]      HPI: Ms. Katie Hernandez is a 64 year old female presenting to cardiology clinic today for syncope follow up. PMH significant for leiomyoma s/p hysterectomy, asthma, colitis.    Patient had COVID earlier in the summer (approx. 2 months ago) accompanied by a fainting spell. She saw a doctor at that time, who recommended aggressive hydration in setting of recent illness. She reports she is still feeling fatigued and sometimes SOB, but all of her symptoms have slowly improved. She reports a remote history of asthma and does not use any inhalers at this time. She does report that she feels fluttering in her heart, usually at nighttime. She also reports she may have symptoms of fluttering or feeling dizzy after drinking coffee then going for a walk. She does state she does not feel likes she is hydrating well.    Patient does state she and her  have had disruptive neighbors for the past 3 years, so she does not sleep well. She is frequently fatigued from being woken up multiple times a night.    Current Cardiac Medications:  none      PAST MEDICAL HISTORY:  Past Medical History:   Diagnosis Date     Abdominal pain 03/2017    ct with long segment of colits descending colon, then colonoscopy and bx by Dr. Dailey and nl     Abnormal EKG 07/2020    nl echo     Abnormal stools 05/30/2013     Agatston coronary artery calcium score less than 100 07/2018    score of 0     Anemia     \"Related to heavy periods\"     Arthritis     Controlled by eliminating dairy.     Breast disorder 2014, 2017    Pain, lump     Complex " endometrial hyperplasia with atypia 10/17/2013    Path dx w complete hysterectomy.      H/O colonoscopy 2017    nl, bx nl     Hypertension     occasional     Kidney cysts     left seen on CT     Macular degeneration     Taking vitamin.     Menarche     cycles q 28 x 5 d     Menorrhagia 2007    Oct 2009 ->anemia to 7+ gms.  Resolved with OTC po FE 50 mg/d      Postpartum depression     Only for a few days     Precancerous skin lesion     excised -- abdomen. ->yrly Derm cks     S/P total hysterectomy and bilateral salpingo-oophorectomy 10/17/2013    Path: -->Complex endometrial hyperplasia with atypia.      Shingles 2020     SOB (shortness of breath) 2017    also elevated d dimer - ct no pe, some fibrosis or atelect, breast cysts, then est echo nl      Swelling of the ankle, feet, or leg     Ankles     Uncomplicated asthma     Childhood. Currently have sensitive airways.     Unspecified hemorrhoids without mention of complication     bleeding to anemia -- hgb 8.4 and lower....     Uterine leiomyoma 2009    [place] diagnosis while in [place] 8/10/2011 US in this EMR confirms multiple myomas with a 12 mm endometrial stripe.  (Problem list name updated by automated process. Provider to review and confirm*       CURRENT MEDICATIONS:  Current Outpatient Medications   Medication Sig Dispense Refill     calcium carbonate (OS-MINERVA) 1500 (600 Ca) MG tablet Take by mouth 2 times daily (with meals)       co-enzyme Q-10 100 MG CAPS capsule Take by mouth daily       fish oil-omega-3 fatty acids 500 MG capsule        magnesium 100 MG CAPS Take by mouth daily       Multiple Vitamins-Minerals (VITEYES AREDS FORMULA/LUTEIN) CAPS Take 1 tablet by mouth daily         PAST SURGICAL HISTORY:  Past Surgical History:   Procedure Laterality Date     APPENDECTOMY OPEN  age 3     BIOPSY  2013    uterine, skin      SECTION   and      COLONOSCOPY      virtual     COLONOSCOPY  2013     Procedure: COLONOSCOPY;  COLONOSCOPY;  Surgeon: Db Reed MD;  Location:  GI     COLONOSCOPY N/A 3/14/2017    Procedure: COMBINED COLONOSCOPY, SINGLE OR MULTIPLE BIOPSY/POLYPECTOMY BY BIOPSY;  Surgeon: Db Reed MD;  Location:  GI     DAVINCI HYSTERECTOMY TOTAL, BILATERAL SALPINGO-OOPHORECTOMY, COMBINED  10/17/2013    Procedure: COMBINED DAVINCI HYSTERECTOMY TOTAL, SALPINGO-OOPHORECTOMY;  DaVinci Assisted Total Laparoscopic Hysterectomy/Bilateral Salpingo Oophorectomy Cystoscopy  ;  Surgeon: Zoya Vance MD;  Location: [facility]     DILATION AND CURETTAGE, OPERATIVE HYSTEROSCOPY WITH MORCELLATOR, COMBINED  2013    Procedure: COMBINED DILATION AND CURETTAGE, OPERATIVE HYSTEROSCOPY WITH MORCELLATOR;  Dilation and Curettage, Hysteroscopy, Hysteroscopic  Polypectomy with Myosure ;  Surgeon: Concepción Gamboa MD;  Location: [facility] OR     LAPAROSCOPY DIAGNOSTIC (GYN)      ectopic excision     ZZC LAP,SURG,COLECTOMY, PARTIAL, W/ANAST  age 3    tumor in colon as child (removed tumor and appendix)       ALLERGIES:  Allergies   Allergen Reactions     Hydrocodone Other (See Comments)     Pt fainted     Penicillins Hives     Chlorhexidine Gluconate      Itching after preop washing.     Doxycycline Other (See Comments)     Feeling of bugs crawling on skin and tinnitus       FAMILY HISTORY:  Family History   Problem Relation Age of Onset     Hypertension Mother 45     Eye Disorder Mother         macular degeneration     Psychotic Disorder Mother         Borderline Personality Disorder ([id number])     Anxiety Disorder Mother      Mental Illness Mother      Diabetes Father 75        DMII     Hypertension Father 65     Other Cancer Father         Carcinoma, Melanoma     Cerebrovascular Disease Father      Cancer Maternal Grandmother 60        uterine/breast     Uterine Cancer Maternal Grandmother      Breast Cancer Paternal Grandmother 45         age 55     Asthma Brother      Thyroid  Disease Sister         Thyroid cancer     Cancer Maternal Aunt 60        ovarian     Cancer - colorectal Maternal Aunt         possible     Breast Cancer Other      Uterine Cancer Other      Mental Illness Other      Colon Cancer No family hx of        SOCIAL HISTORY:  Social History     Tobacco Use     Smoking status: Never     Smokeless tobacco: Never   Substance Use Topics     Alcohol use: Never     Comment: 1/2 d / mo     Drug use: Never       ROS:   Constitutional: No fever, chills, or sweats.  Cardiovascular: As per HPI.       Exam:  /73 (BP Location: Left arm, Patient Position: Chair, Cuff Size: Adult Regular)   Pulse 70   Wt 60.8 kg (134 lb)   LMP 08/20/2013   SpO2 99%   BMI 23.00 kg/m     GENERAL: alert and no distress  RESPIRATORY: lungs clear to auscultation - no rales, rhonchi or wheezes, no use of accessory muscles, respirations are unlabored, normal respiratory rate  CARDIOVASCULAR: RRR, +S1S2, no murmur, rub, or gallop.   GI: soft, non tender  EXTREMITIES: peripheral pulses normal, no peripheral edema  NEURO: alert, normal speech and affect  SKIN: no jaundice, no rashes or lesions      Labs:  Lab Results   Component Value Date    WBC 4.7 06/20/2022    WBC 4.9 04/20/2021    RBC 4.29 06/20/2022    RBC 4.51 04/20/2021    HGB 12.8 06/20/2022    HGB 13.9 04/20/2021    HCT 39.6 06/20/2022    HCT 40.8 04/20/2021    MCV 92 06/20/2022    MCV 91 04/20/2021    MCH 29.8 06/20/2022    MCH 30.8 04/20/2021    MCHC 32.3 06/20/2022    MCHC 34.1 04/20/2021    RDW 12.7 06/20/2022    RDW 13.1 04/20/2021     06/20/2022     04/20/2021       Lab Results   Component Value Date     06/20/2022     04/20/2021    POTASSIUM 4.0 06/20/2022    POTASSIUM 4.0 04/20/2021    CHLORIDE 109 06/20/2022    CHLORIDE 106 04/20/2021    CO2 27 06/20/2022    CO2 32 04/20/2021    ANIONGAP 5 06/20/2022    ANIONGAP 1 (L) 04/20/2021    GLC 66 (L) 06/20/2022    GLC 75 04/20/2021    BUN 15 06/20/2022    BUN 11  "04/20/2021    CR 0.73 06/20/2022    CR 0.75 04/20/2021    GFRESTIMATED >90 06/20/2022    GFRESTIMATED 86 04/20/2021    GFRESTBLACK >90 04/20/2021    MINERVA 8.8 06/20/2022    MINERVA 9.0 04/20/2021        No results found for: \"INR\"    No results found for: \"NTBNP\"    Lab Results   Component Value Date    CHOL 130 06/20/2022    CHOL 150 04/20/2021     Lab Results   Component Value Date    HDL 78 06/20/2022    HDL 82 04/20/2021     Lab Results   Component Value Date    LDL 34 06/20/2022    LDL 52 04/20/2021     Lab Results   Component Value Date    TRIG 90 06/20/2022    TRIG 80 04/20/2021     Lab Results   Component Value Date    CHOLHDLRATIO 1.7 12/18/2013       Diagnostics  EKG 6/5/23      Stress Echocardiogram 2021  Interpretation Summary  1. The patient exercised 9:30min. Above average functional capacity for age.  2. The patient did not exhibit any symptoms during exercise.  3. This was a normal stress EKG with no evidence of stress-induced ischemia.  4. Normal left ventricular function and wall motion at rest and post-stress.  ______________________________________________________________________________  Stress  The patient exercised 9:30min.  There was a normal BP response to exercise.  The patient did not exhibit any symptoms during exercise.  Exercise was stopped due to fatigue.  A treadmill exercise test according to the Jd protocol was performed.  Target Heart Rate was achieved.  This was a normal stress EKG with no evidence of stress-induced ischemia.  Normal left ventricular function and wall motion at rest and post-stress.     Baseline  Resting ECG is normal.  Normal left ventricular function and wall motion at rest and post-stress.     Stress Results             Protocol:  Jd          Maximum Predicted HR:   160 bpm             Target HR: 136 bpm        % Maximum Predicted HR: 96 %        Stage  DurationHeart Rate  BP                    Comment            (mm:ss)   (bpm)     Jd    3:00      90    " 102/70    Stage 2   3:00     113    112/70    Stage 3   3:00     139    118/70    Stage 4   0:30     153      /   FAC Above Average; Alberto Score 9; RPP 18,054   RecoveryR  4:00      92    110/70            Stress Duration:   9:30 mm:ss *        Recovery Time: 4:00 mm:ss         Maximum Stress HR: 153 bpm *           METS:          11     Left Ventricle  Left ventricular systolic function is normal. The visual ejection fraction is  estimated at 55-60%.     Tricuspid Valve  The tricuspid valve is normal in structure and function. There is trace  tricuspid regurgitation.     Aortic Valve  The aortic valve is normal in structure and function.     Pulmonic Valve  The pulmonic valve is normal in structure and function.     Pericardium  There is no pericardial effusion.    CT Calcium 7/17/18  IMPRESSIONS:  1.  No coronary calcifications.  2.  The total Agatston calcium score is 0 placing the patient in the  lowest percentile when compared to age and gender matched control  group.  3.  Please review Radiology report for incidental noncardiac findings  that will follow separately.      CORONARY ARTERY CALCIUM SCORES:   Total calcium score: 0  Left main coronary artery: 0  Left anterior descending coronary artery: 0  Circumflex coronary artery: 0  Right coronary artery: 0        Assessment and Plan:   Ms. Hernandez is a 64 year old female with a PMH of leiomyoma s/p hysterectomy, asthma, colitis.    # Syncope in setting of COVID  # Palpitations  - Zio patch for 7 days  - Encourage continued hydration  - Encourage positive sleep hygiene as able      Follow up:  - to be determined pending Zio patch results      JOCELYN Luke, FNP-C  [facility] General Cardiology  Securely message with [employer]   Text page via [employer] Paging/Directory          Chart review time: 7 minutes  Visit time: 20 minutes  Chart completion/documentation: 5 minutes  Total time spent: 32 minutes       Please do not hesitate to contact me if you have any  questions/concerns.     Sincerely,     JOCELYN Luke CNP

## 2024-10-01 NOTE — NURSING NOTE
"Chief Complaint   Patient presents with    RECHECK     Return general cardiology for follow up--had covid and has been having fainting spells.    Syncope    Fatigue    Dizziness    Palpitations    Shortness of Breath    Chest Pain       Initial /73 (BP Location: Left arm, Patient Position: Chair, Cuff Size: Adult Regular)   Pulse 70   Wt 60.8 kg (134 lb)   LMP 08/20/2013   SpO2 99%   BMI 23.00 kg/m   Estimated body mass index is 23 kg/m  as calculated from the following:    Height as of 3/7/24: 1.626 m (5' 4\").    Weight as of this encounter: 60.8 kg (134 lb)..  BP completed using cuff size: regular    MARIA C Haynes    "

## 2024-10-01 NOTE — NURSING NOTE
Katie Hernandez arrived here on 10/1/2024 12:56 PM for 3-7 Days  Zio monitor placement per ordering provider Radha Tracey for the diagnosis Palpitations.  Patient s skin was prepped per protocol. Dr. Subramanian is the supervising MD.  Zio monitor was placed.  Instructions were reviewed with and given to the patient.  Patient verbalized understanding of wear, troubleshooting and monitor return instructions.    MARIA C Haynes

## 2024-10-01 NOTE — PATIENT INSTRUCTIONS
Thank you for coming to the [facility]; please note the following instructions:    1. Zio patch for 7 days  2. Follow up will be indicated by Zio patch results      If you have any questions regarding your visit please contact your care team:     Cardiology  Telephone Number   Shelbi IRVING., RN  Lissy CONWAY, RN  Natali JAVED, RN  Staci FLOYD, RMA  Johanna HURT, RMA  Jacqueline MOORE, Visit Facilitator  Sarah JAIN [facility] [phone] (option 1)   For scheduling appts:     [phone] (select option 1)       For the Device Clinic (Pacemakers and ICD's)  RN's :  Maria Teresa Salgado   During business hours: [phone]    *After business hours:  [phone] (select option 4)      Normal test result notifications will be released via [employer] or mailed within 7 business days.  All other test results, will be communicated via telephone once reviewed by your cardiologist.    If you need a medication refill please contact your pharmacy.  Please allow 3 business days for your refill to be completed.    As always, thank you for trusting us with your health care needs!

## 2024-10-02 ENCOUNTER — TELEPHONE (OUTPATIENT)
Dept: CARDIOLOGY | Facility: CLINIC | Age: 64
End: 2024-10-02
Payer: COMMERCIAL

## 2024-10-02 NOTE — TELEPHONE ENCOUNTER
Message received from [employer] calling to verify Zio serial number for Zio placed 10/1. Left a message for patient to call back with serial number.    MARIA C Haynes

## 2024-10-15 PROCEDURE — 93248 EXT ECG>7D<15D REV&INTERPJ: CPT | Performed by: INTERNAL MEDICINE

## 2024-10-24 ENCOUNTER — HOSPITAL ENCOUNTER (OUTPATIENT)
Dept: MAMMOGRAPHY | Facility: CLINIC | Age: 64
Discharge: HOME | End: 2024-10-24
Attending: INTERNAL MEDICINE | Admitting: INTERNAL MEDICINE
Payer: COMMERCIAL

## 2024-10-24 DIAGNOSIS — Z12.31 VISIT FOR SCREENING MAMMOGRAM: ICD-10-CM

## 2024-10-24 PROCEDURE — 77063 BREAST TOMOSYNTHESIS BI: CPT

## 2025-02-07 ENCOUNTER — MEDICAL CORRESPONDENCE (OUTPATIENT)
Dept: HEALTH INFORMATION MANAGEMENT | Facility: CLINIC | Age: 65
End: 2025-02-07
Payer: COMMERCIAL

## 2025-02-10 ENCOUNTER — TRANSCRIBE ORDERS (OUTPATIENT)
Dept: GASTROENTEROLOGY | Facility: CLINIC | Age: 65
End: 2025-02-10
Payer: COMMERCIAL

## 2025-02-10 DIAGNOSIS — Z12.11 ENCOUNTER FOR SCREENING COLONOSCOPY: Primary | ICD-10-CM

## 2025-02-17 ENCOUNTER — TELEPHONE (OUTPATIENT)
Dept: GASTROENTEROLOGY | Facility: CLINIC | Age: 65
End: 2025-02-17
Payer: COMMERCIAL

## 2025-02-17 NOTE — TELEPHONE ENCOUNTER
RN Review for scheduling.  Patient reported during scheduling she has hx of tachycardia and low BP.  Writer notes the patient has been seen for these symptoms and wore a Zio Patch.  Cards doc reviewed, noted heart fxn normal.  Pt has had CS at  in past for colonoscopy and tolerated well.  Ok to proceed at  with CS.          Corinne Kliber, RN  Endoscopy Procedure Pre Assessment RN  [phone] option 3

## 2025-02-17 NOTE — TELEPHONE ENCOUNTER
"Endoscopy Scheduling Screen    Have you had any respiratory illness or flu-like symptoms in the last 10 days?  No    What is your communication preference for Instructions and/or Bowel Prep?   MyChart    What insurance is in the chart?  Other:  [facility]    Ordering/Referring Provider: Carri Alaniz MD in  ENDOSCOPY   (If ordering provider performs procedure, schedule with ordering provider unless otherwise instructed. )    BMI: Estimated body mass index is 23 kg/m  as calculated from the following:    Height as of 3/7/24: 1.626 m (5' 4\").    Weight as of 10/1/24: 60.8 kg (134 lb).     Sedation Ordered  moderate sedation.   If patient BMI > 50 do not schedule in [facility].    If patient BMI > 45 do not schedule at [facility].    Are you taking methadone or Suboxone?  NO, No RN review required.    Have you been diagnosed and are being treated for severe PTSD or severe anxiety?  NO, No RN review required.    Are you taking any prescription medications for pain 3 or more times per week?   NO, No RN review required.    Do you have a history of malignant hyperthermia?  No    (Females) Are you currently pregnant?   No     Have you been diagnosed or told you have pulmonary hypertension?   No    Do you have an LVAD?  No    Have you been told you have moderate to severe sleep apnea?  No.    Have you been told you have COPD, asthma, or any other lung disease?  Yes     What breathing problems do you have?  Asthma and Sensitive airways      Do you use home oxygen?  No    Have your breathing problems required an ED visit or hospitalization in the last year?  No.    Do you  have a history of any heart conditions or any upcoming cardiac exams like an echo, angiogram, stress test, or ablation?  Yes (RN Review required for scheduling.)  Low BP and Sinus Tachycardia     Have you had any hospitalizations  in the last year for heart related issues, for example a stent placement, heart attack, or cardiomyopathy?  No    Do you have any " "implantable devices in your body (pacemaker, ICD)?  No    Do you take nitroglycerine?  No    Have you ever had or are you waiting for an organ transplant?  No. Continue scheduling, no site restrictions.    Have you had a stroke or transient ischemic attack (TIA aka \"mini stroke\") in the last 2 years?   No.    Have you been diagnosed with or been told you have cirrhosis of the liver?   No.    Are you currently on dialysis?   No    Do you need assistance transferring?   No    BMI: Estimated body mass index is 23 kg/m  as calculated from the following:    Height as of 3/7/24: 1.626 m (5' 4\").    Weight as of 10/1/24: 60.8 kg (134 lb).     Is patients BMI > 40 and scheduling location UP?  No    Do you take an injectable or oral medication for weight loss or diabetes (excluding insulin)?  No    Do you take the medication Naltrexone?  No    Do you take blood thinners?  No       Prep   Are you currently on dialysis or do you have chronic kidney disease?  No    Do you have a diagnosis of diabetes?  No    Do you have a diagnosis of cystic fibrosis (CF)?  No    On a regular basis do you go 3 -5 days between bowel movements?  No    BMI > 40?  No    Preferred Pharmacy:    [facility] - [place], [place] - [address]  [address] [place] [address]  Phone: [phone] Fax: [phone]      Final Scheduling Details     Procedure scheduled  Colonoscopy    Surgeon:  Katty     Patient Reminders:   You will receive a call from a Nurse to review instructions and health history.  This assessment must be completed prior to your procedure.  Failure to complete the Nurse assessment may result in the procedure being cancelled.      On the day of your procedure, please designate an adult(s) who can drive you home stay with you for the next 24 hours. The medicines used in the exam will make you sleepy. You will not be able to drive.      You cannot take public transportation, ride share services, or non-medical " taxi service without a responsible caregiver.  Medical transport services are allowed with the requirement that a responsible caregiver will receive you at your destination.  We require that drivers and caregivers are confirmed prior to your procedure.

## 2025-05-01 ENCOUNTER — OFFICE VISIT (OUTPATIENT)
Dept: URGENT CARE | Facility: URGENT CARE | Age: 65
End: 2025-05-01
Payer: COMMERCIAL

## 2025-05-01 ENCOUNTER — NURSE TRIAGE (OUTPATIENT)
Dept: FAMILY MEDICINE | Facility: CLINIC | Age: 65
End: 2025-05-01
Payer: COMMERCIAL

## 2025-05-01 VITALS
HEART RATE: 72 BPM | WEIGHT: 132 LBS | OXYGEN SATURATION: 99 % | RESPIRATION RATE: 15 BRPM | TEMPERATURE: 97.6 F | DIASTOLIC BLOOD PRESSURE: 60 MMHG | SYSTOLIC BLOOD PRESSURE: 100 MMHG | BODY MASS INDEX: 22.53 KG/M2 | HEIGHT: 64 IN

## 2025-05-01 DIAGNOSIS — L08.9 SKIN INFECTION: Primary | ICD-10-CM

## 2025-05-01 RX ORDER — MUPIROCIN 20 MG/G
OINTMENT TOPICAL 3 TIMES DAILY
Qty: 30 G | Refills: 0 | Status: SHIPPED | OUTPATIENT
Start: 2025-05-01 | End: 2025-05-08

## 2025-05-01 ASSESSMENT — ASTHMA QUESTIONNAIRES
QUESTION_5 LAST FOUR WEEKS HOW WOULD YOU RATE YOUR ASTHMA CONTROL: COMPLETELY CONTROLLED
QUESTION_1 LAST FOUR WEEKS HOW MUCH OF THE TIME DID YOUR ASTHMA KEEP YOU FROM GETTING AS MUCH DONE AT WORK, SCHOOL OR AT HOME: NONE OF THE TIME
QUESTION_3 LAST FOUR WEEKS HOW OFTEN DID YOUR ASTHMA SYMPTOMS (WHEEZING, COUGHING, SHORTNESS OF BREATH, CHEST TIGHTNESS OR PAIN) WAKE YOU UP AT NIGHT OR EARLIER THAN USUAL IN THE MORNING: NOT AT ALL
ACT_TOTALSCORE: 25
QUESTION_2 LAST FOUR WEEKS HOW OFTEN HAVE YOU HAD SHORTNESS OF BREATH: NOT AT ALL
QUESTION_4 LAST FOUR WEEKS HOW OFTEN HAVE YOU USED YOUR RESCUE INHALER OR NEBULIZER MEDICATION (SUCH AS ALBUTEROL): NOT AT ALL

## 2025-05-01 NOTE — PROGRESS NOTES
"Chief Complaint   Patient presents with    Urgent Care     Infection near naval x 2 weeks.        Assessment & Plan  Assessment  - Superficial infection characterized by some erythema but no purulent discharge, abscess or pustules.  No significant spreading of the redness    Plan  - Prescribe Bactroban ointment for topical infection treatment.  - Apply Bactroban two to three times a day for 1 to 2 weeks, with a minimum of 7 days.  - Return for evaluation if symptoms persist or worsen after 1 to 2 weeks.  - Gently clean the affected area during daily showering or bathing and dry thoroughly.      ICD-10-CM    1. Skin infection  L08.9 mupirocin (BACTROBAN) 2 % external ointment          SUBJECTIVE:  History of Present Illness-Katie Hernandez, a 65-year-old female, reports that approximately two weeks ago, she developed an issue after visiting the beach, where she believes she scratched her skin while cleaning off sand. Since then, she has experienced some redness and previously had \"bubbles of infection.\" She has been using tea tree oil, which has improved the condition, but some redness persists. She denies experiencing other abdominal pain, fever, chills, vomiting or diarrhea, or abdominal pain, and reports no pain during urination.     ROS: Pertinent ROS neg other than the symptoms noted above in the HPI.     OBJECTIVE:  /60   Pulse 72   Temp 97.6  F (36.4  C) (Oral)   Resp 15   Ht 1.626 m (5' 4\")   Wt 59.9 kg (132 lb)   LMP 08/20/2013   SpO2 99%   BMI 22.66 kg/m     Physical Exam  - ABDOMEN: No tenderness upon palpation.  - SKIN: Erythema in the umbilicus without any abscess, blisters or pustules  - GENITOURINARY: No dysuria reported.       DIAGNOSTICS       No results found for any visits on 05/01/25.     Blaine Sargent PA-C    Consent was obtained from the patient to use an AI documentation tool in the creation of this note.  Any grammatical or spelling errors are non-intentional. Please " "contact the author of this note directly if you are in need of any clarification.     Current Outpatient Medications   Medication Sig Dispense Refill    calcium carbonate (OS-MINERVA) 1500 (600 Ca) MG tablet Take by mouth 2 times daily (with meals)      co-enzyme Q-10 100 MG CAPS capsule Take by mouth daily      fish oil-omega-3 fatty acids 500 MG capsule       magnesium 100 MG CAPS Take by mouth daily      Multiple Vitamins-Minerals (VITEYES AREDS FORMULA/LUTEIN) CAPS Take 1 tablet by mouth daily      mupirocin (BACTROBAN) 2 % external ointment Apply topically 3 times daily for 7 days. 30 g 0     No current facility-administered medications for this visit.      Patient Active Problem List   Diagnosis    Mild intermittent asthma    Lipoma of axilla -- left    Cyst of breast, right, solitary    Stiffness of joint, hand    Fibrocystic breast changes    Other anterior corneal dystrophies    Macular degeneration      Past Medical History:   Diagnosis Date    Abdominal pain 03/2017    ct with long segment of colits descending colon, then colonoscopy and bx by Dr. Dailey and nl    Abnormal EKG 07/2020    nl echo    Abnormal stools 05/30/2013    Agatston coronary artery calcium score less than 100 07/2018    score of 0    Anemia     \"Related to heavy periods\"    Arthritis     Controlled by eliminating dairy.    Breast disorder 2014, 2017    Pain, lump    Complex endometrial hyperplasia with atypia 10/17/2013    Path dx w complete hysterectomy.     H/O colonoscopy 03/2017    nl, bx nl    Hypertension     occasional    Kidney cysts     left seen on CT    Macular degeneration 2015    Taking vitamin.    Menarche     cycles q 28 x 5 d    Menorrhagia 05/16/2007    Oct 2009 ->anemia to 7+ gms.  Resolved with OTC po FE 50 mg/d     Postpartum depression     Only for a few days    Precancerous skin lesion     excised -- abdomen. ->yrly Derm cks    S/P total hysterectomy and bilateral salpingo-oophorectomy 10/17/2013    Path: -->Complex " endometrial hyperplasia with atypia.     Shingles 2020    SOB (shortness of breath) 2017    also elevated d dimer - ct no pe, some fibrosis or atelect, breast cysts, then est echo nl     Swelling of the ankle, feet, or leg     Ankles    Uncomplicated asthma     Childhood. Currently have sensitive airways.    Unspecified hemorrhoids without mention of complication     bleeding to anemia -- hgb 8.4 and lower....    Uterine leiomyoma 2009    [place] diagnosis while in [place] 8/10/2011 US in this EMR confirms multiple myomas with a 12 mm endometrial stripe.  (Problem list name updated by automated process. Provider to review and confirm*     Past Surgical History:   Procedure Laterality Date    APPENDECTOMY OPEN  age 3    BIOPSY      uterine, skin     SECTION   and     COLONOSCOPY      virtual    COLONOSCOPY  2013    Procedure: COLONOSCOPY;  COLONOSCOPY;  Surgeon: Db Reed MD;  Location:  GI    COLONOSCOPY N/A 3/14/2017    Procedure: COMBINED COLONOSCOPY, SINGLE OR MULTIPLE BIOPSY/POLYPECTOMY BY BIOPSY;  Surgeon: Db Reed MD;  Location:  GI    DAVINCI HYSTERECTOMY TOTAL, BILATERAL SALPINGO-OOPHORECTOMY, COMBINED  10/17/2013    Procedure: COMBINED DAVINCI HYSTERECTOMY TOTAL, SALPINGO-OOPHORECTOMY;  DaVinci Assisted Total Laparoscopic Hysterectomy/Bilateral Salpingo Oophorectomy Cystoscopy  ;  Surgeon: Zoya Vance MD;  Location: [facility] OR    DILATION AND CURETTAGE, OPERATIVE HYSTEROSCOPY WITH MORCELLATOR, COMBINED  2013    Procedure: COMBINED DILATION AND CURETTAGE, OPERATIVE HYSTEROSCOPY WITH MORCELLATOR;  Dilation and Curettage, Hysteroscopy, Hysteroscopic  Polypectomy with Myosure ;  Surgeon: Concepción Gamboa MD;  Location: [facility] OR    LAPAROSCOPY DIAGNOSTIC (GYN)      ectopic excision    ZZC LAP,SURG,COLECTOMY, PARTIAL, W/ANAST  age 3    tumor in colon as child (removed tumor and appendix)     Family History   Problem  Relation Age of Onset    Hypertension Mother 45    Eye Disorder Mother         macular degeneration    Psychotic Disorder Mother         Borderline Personality Disorder ([id number])    Anxiety Disorder Mother     Mental Illness Mother     Diabetes Father 75        DMII    Hypertension Father 65    Other Cancer Father         Carcinoma, Melanoma    Cerebrovascular Disease Father     Cancer Maternal Grandmother 60        uterine/breast    Uterine Cancer Maternal Grandmother     Breast Cancer Paternal Grandmother 45         age 55    Asthma Brother     Thyroid Disease Sister         Thyroid cancer    Cancer Maternal Aunt 60        ovarian    Cancer - colorectal Maternal Aunt         possible    Breast Cancer Other     Uterine Cancer Other     Mental Illness Other     Colon Cancer No family hx of      Social History     Tobacco Use    Smoking status: Never    Smokeless tobacco: Never   Substance Use Topics    Alcohol use: Never     Comment: 1/2 d / mo

## 2025-05-01 NOTE — TELEPHONE ENCOUNTER
Nurse Triage SBAR    Is this a 2nd Level Triage? NO    Situation: Pt reports she has redness and pain on her navel for two weeks. Pt wants to know if she should be seen.     Background:Pt reports she had some pus but denies drainage during call. She denies swelling but, is red and painful with pressure.     Assessment: Pt needs to be evaluated.     Protocol Recommended Disposition:   Go To Office Now    Recommendation: See care at  today.     Does the patient meet one of the following criteria for ADS visit consideration? 16+ years old, with an [facility] PCP     TIP  Providers, please consider if this condition is appropriate for management at one of our Acute and Diagnostic Services sites.     If patient is a good candidate, please use dotphrase <dot>triageresponse and select Refer to ADS to document.      Reason for Disposition   Looks infected (spreading redness, red streak, pus) and fever    Additional Information   Negative: Widespread rash and bright red, sunburn-like and too weak to stand   Negative: Sounds like a life-threatening emergency to the triager   Negative: Wound infection diagnosed and taking an antibiotic   Negative: Cellulitis diagnosed and taking an antibiotic   Negative: Animal bite wound infection suspected   Negative: Boil suspected (painful red lump)   Negative: Surgical wound infection suspected (post-op)   Negative: Skin glue used to close wound and not infected   Negative: Stitches and not infected   Negative: SEVERE pain in the wound   Negative: SEVERE pain with bending of finger (or toe) in wound on hand (or foot)   Negative: Bright red, wide-spread, sunburn-like rash    Protocols used: Wound Infection [id number]

## 2025-05-01 NOTE — PROGRESS NOTES
Urgent Care Clinic Visit    Chief Complaint   Patient presents with    Urgent Care     Infection near naval x 2 weeks.                5/1/2025     6:11 PM   Additional Questions   Roomed by MONSE Jimenez

## 2025-05-06 ENCOUNTER — VIRTUAL VISIT (OUTPATIENT)
Dept: FAMILY MEDICINE | Facility: CLINIC | Age: 65
End: 2025-05-06
Payer: COMMERCIAL

## 2025-05-06 ENCOUNTER — TELEPHONE (OUTPATIENT)
Dept: FAMILY MEDICINE | Facility: CLINIC | Age: 65
End: 2025-05-06

## 2025-05-06 DIAGNOSIS — R21 RASH: Primary | ICD-10-CM

## 2025-05-06 NOTE — PROGRESS NOTES
Katie is a 65 year old who is being evaluated via a billable video visit.    How would you like to obtain your AVS? MyChart  If the video visit is dropped, the invitation should be resent by: Text to cell phone: [phone]  Will anyone else be joining your video visit? No          Subjective   Katie is a 65 year old, presenting for the following health issues:  No chief complaint on file.    This is a video visit for this patient regaining 2 types of rash.  She notes a rash on her chest, shoulders and legs, rash;  eczema on inner elbows, neck, shins an hands.  The eczema did hurt, now is better, used zyrtec for a week, that made it better.  She tried over-the-counter cortisone and this did not help, she tried a home remedy which did seem to help somewhat.  She also notes that she has felt tired, not now though.  Diarrhea and constipation, returned 3 weeks ago.  Has had this before.  Stress level present but not as bad.  No fever, ns, weight loss.    Jt's ok.  She wonders about some sort of systemic issue.    I really could not view the rash very well by video.  I have suggested she come in for further evaluation and she will do it this Thursday at 1.    Total time 13 minutes    Mahesh Santoyo M.D.      Vitals:  No vitals were obtained today due to virtual visit.    Physical Exam   GENERAL: alert and no distress  EYES: Eyes grossly normal to inspection.  No discharge or erythema, or obvious scleral/conjunctival abnormalities.  RESP: No audible wheeze, cough, or visible cyanosis.    SKIN: Visible skin clear. No significant rash, abnormal pigmentation or lesions.  NEURO: Cranial nerves grossly intact.  Mentation and speech appropriate for age.  PSYCH: Appropriate affect, tone, and pace of words          Video-Visit Details    Type of service:  Video Visit   Originating Location (pt. Location): Home    Distant Location (provider location):  On-site  Platform used for Video Visit: Jenny  Signed Electronically by:  Mahesh Santoyo MD

## 2025-05-06 NOTE — TELEPHONE ENCOUNTER
Patient called to report reaction to mupirocin. Developed a rash for three days which is gone now.   Writer added medication to allergy list.     Rachel Ivy, BSN RN  [facility]

## 2025-05-08 ENCOUNTER — OFFICE VISIT (OUTPATIENT)
Dept: FAMILY MEDICINE | Facility: CLINIC | Age: 65
End: 2025-05-08
Payer: COMMERCIAL

## 2025-05-08 VITALS — SYSTOLIC BLOOD PRESSURE: 118 MMHG | HEART RATE: 70 BPM | DIASTOLIC BLOOD PRESSURE: 76 MMHG

## 2025-05-08 DIAGNOSIS — R21 RASH AND NONSPECIFIC SKIN ERUPTION: Primary | ICD-10-CM

## 2025-05-08 LAB
ALBUMIN SERPL BCG-MCNC: 4.4 G/DL (ref 3.5–5.2)
ALP SERPL-CCNC: 69 U/L (ref 40–150)
ALT SERPL W P-5'-P-CCNC: 27 U/L (ref 0–50)
ANION GAP SERPL CALCULATED.3IONS-SCNC: 11 MMOL/L (ref 7–15)
AST SERPL W P-5'-P-CCNC: 31 U/L (ref 0–45)
BILIRUB SERPL-MCNC: 0.2 MG/DL
BUN SERPL-MCNC: 13.9 MG/DL (ref 8–23)
CALCIUM SERPL-MCNC: 10 MG/DL (ref 8.8–10.4)
CHLORIDE SERPL-SCNC: 102 MMOL/L (ref 98–107)
CREAT SERPL-MCNC: 0.73 MG/DL (ref 0.51–0.95)
EGFRCR SERPLBLD CKD-EPI 2021: >90 ML/MIN/1.73M2
ERYTHROCYTE [DISTWIDTH] IN BLOOD BY AUTOMATED COUNT: 12.7 % (ref 10–15)
GLUCOSE SERPL-MCNC: 99 MG/DL (ref 70–99)
HCO3 SERPL-SCNC: 29 MMOL/L (ref 22–29)
HCT VFR BLD AUTO: 41 % (ref 35–47)
HGB BLD-MCNC: 13.3 G/DL (ref 11.7–15.7)
MCH RBC QN AUTO: 29.4 PG (ref 26.5–33)
MCHC RBC AUTO-ENTMCNC: 32.4 G/DL (ref 31.5–36.5)
MCV RBC AUTO: 91 FL (ref 78–100)
PLATELET # BLD AUTO: 247 10E3/UL (ref 150–450)
POTASSIUM SERPL-SCNC: 4.2 MMOL/L (ref 3.4–5.3)
PROT SERPL-MCNC: 7.3 G/DL (ref 6.4–8.3)
RBC # BLD AUTO: 4.53 10E6/UL (ref 3.8–5.2)
SODIUM SERPL-SCNC: 142 MMOL/L (ref 135–145)
WBC # BLD AUTO: 5 10E3/UL (ref 4–11)

## 2025-05-08 NOTE — PROGRESS NOTES
"The patient is here for a rash that began in December.  She had a lot of stress then.  It did get better but it has persisted.  He can itching in the neck and her at times.  Its better now.  It comes and goes in different areas.  She is not systemically ill but worries about a systemic illness.  No change in her meds, diet, detergents etc.  No fevers or night sweats.  She was just in earlier in [facility] and got back 3 weeks ago.  She has had some loose stool since then.  Not bloody or black.  No abdominal pain or nausea.  No unexplained weight loss or fevers    Past Medical History:   Diagnosis Date    Abdominal pain 03/2017    ct with long segment of colits descending colon, then colonoscopy and bx by Dr. Dailey and nl    Abnormal EKG 07/2020    nl echo    Abnormal stools 05/30/2013    Agatston coronary artery calcium score less than 100 07/2018    score of 0    Anemia     \"Related to heavy periods\"    Arthritis     Controlled by eliminating dairy.    Breast disorder 2014, 2017    Pain, lump    Complex endometrial hyperplasia with atypia 10/17/2013    Path dx w complete hysterectomy.     Gallstones 02/2024    on us done for epig pain, seen by surgery    H/O colonoscopy 03/2017    nl, bx nl    Hypertension     occasional    Kidney cysts     left seen on CT    Macular degeneration 2015    Taking vitamin.    Menarche     cycles q 28 x 5 d    Menorrhagia 05/16/2007    Oct 2009 ->anemia to 7+ gms.  Resolved with OTC po FE 50 mg/d     Postpartum depression     Only for a few days    Precancerous skin lesion     excised -- abdomen. ->yrly Derm cks    S/P total hysterectomy and bilateral salpingo-oophorectomy 10/17/2013    Path: -->Complex endometrial hyperplasia with atypia.     Shingles 03/2020    SOB (shortness of breath) 04/2017    also elevated d dimer - ct no pe, some fibrosis or atelect, breast cysts, then est echo nl 4/17    Swelling of the ankle, feet, or leg     Ankles    Uncomplicated asthma     Childhood. " Currently have sensitive airways.    Unspecified hemorrhoids without mention of complication     bleeding to anemia -- hgb 8.4 and lower....    Uterine leiomyoma 2009    US diagnosis while in [place] 8/10/2011 US in this EMR confirms multiple myomas with a 12 mm endometrial stripe.  (Problem list name updated by automated process. Provider to review and confirm*     Past Surgical History:   Procedure Laterality Date    APPENDECTOMY OPEN  age 3    BIOPSY      uterine, skin     SECTION   and     COLONOSCOPY      virtual    COLONOSCOPY  2013    Procedure: COLONOSCOPY;  COLONOSCOPY;  Surgeon: Db Reed MD;  Location:  GI    COLONOSCOPY N/A 3/14/2017    Procedure: COMBINED COLONOSCOPY, SINGLE OR MULTIPLE BIOPSY/POLYPECTOMY BY BIOPSY;  Surgeon: Db Reed MD;  Location:  GI    DAVINCI HYSTERECTOMY TOTAL, BILATERAL SALPINGO-OOPHORECTOMY, COMBINED  10/17/2013    Procedure: COMBINED DAVINCI HYSTERECTOMY TOTAL, SALPINGO-OOPHORECTOMY;  DaVinci Assisted Total Laparoscopic Hysterectomy/Bilateral Salpingo Oophorectomy Cystoscopy  ;  Surgeon: Zoya Vance MD;  Location: [facility] OR    DILATION AND CURETTAGE, OPERATIVE HYSTEROSCOPY WITH MORCELLATOR, COMBINED  2013    Procedure: COMBINED DILATION AND CURETTAGE, OPERATIVE HYSTEROSCOPY WITH MORCELLATOR;  Dilation and Curettage, Hysteroscopy, Hysteroscopic  Polypectomy with Myosure ;  Surgeon: Concepción Gamboa MD;  Location: [facility] OR    LAPAROSCOPY DIAGNOSTIC (GYN)      ectopic excision    ZZC LAP,SURG,COLECTOMY, PARTIAL, W/ANAST  age 3    tumor in colon as child (removed tumor and appendix)     Social History     Socioeconomic History    Marital status:      Spouse name: Not on file    Number of children: 2    Years of education: Not on file    Highest education level: Not on file   Occupational History    Occupation: Faculty     Employer: [employer]     Comment: Teaches Translation   Tobacco Use     Smoking status: Never    Smokeless tobacco: Never   Vaping Use    Vaping status: Never Used   Substance and Sexual Activity    Alcohol use: Never     Comment: 1/2 d / mo    Drug use: Never    Sexual activity: Yes     Partners: Male     Birth control/protection: Post-menopausal     Comment: Vastectomy   Other Topics Concern     Service Not Asked    Blood Transfusions No    Caffeine Concern No     Comment: Stopped all caffiene 2nd to breast pain    Occupational Exposure No    Hobby Hazards No    Sleep Concern No    Stress Concern No    Weight Concern No    Special Diet No    Back Care No    Exercise No     Comment: 45' aerobic 3xs/wk    Bike Helmet No    Seat Belt No    Self-Exams Not Asked    Parent/sibling w/ CABG, MI or angioplasty before 65F 55M? No   Social History Narrative    Not on file     Social Drivers of Health     Financial Resource Strain: Not on file   Food Insecurity: Not on file   Transportation Needs: Not on file   Physical Activity: Inactive (12/14/2018)    Exercise Vital Sign     Days of Exercise per Week: 0 days     Minutes of Exercise per Session: 0 min   Stress: Stress Concern Present (12/14/2018)    [employer] - Occupational Stress Questionnaire     Feeling of Stress : Very much   Social Connections: Unknown (9/21/2023)    Received from [facility]    Social Connections     Frequency of Communication with Friends and Family: Not on file   Interpersonal Safety: Not on file   Housing Stability: Not on file     Current Outpatient Medications   Medication Sig Dispense Refill    calcium carbonate (OS-MINERVA) 1500 (600 Ca) MG tablet Take by mouth 2 times daily (with meals)      co-enzyme Q-10 100 MG CAPS capsule Take by mouth daily      fish oil-omega-3 fatty acids 500 MG capsule       magnesium 100 MG CAPS Take by mouth daily      Multiple Vitamins-Minerals (VITEYES AREDS FORMULA/LUTEIN) CAPS Take 1 tablet by mouth daily       mupirocin (BACTROBAN) 2 % external ointment Apply topically 3 times daily for 7 days. 30 g 0     Allergies   Allergen Reactions    Hydrocodone Other (See Comments)     Pt fainted    Penicillins Hives    Chlorhexidine Gluconate      Itching after preop washing.    Doxycycline Other (See Comments)     Feeling of bugs crawling on skin and tinnitus    Mupirocin Rash     FAMILY HISTORY NOTED AND REVIEWED    REVIEW OF SYSTEMS: above    PHYSICAL EXAM    /76   Pulse 70   LMP 08/20/2013     Patient appears non toxic  Mouth - tongue midline and within normal limits, mucous membranes and posterior pharynx within normal limits, no lesions seen.  Neck - no masses, lesions or tenderness  Nodes - no supraclavicular, cervical or axially adenopathy .  Lungs - clear, normal flow  Cardiovascular - regular rate and rhythm, no murmer, rub or gallop, no jvp or edema, carotids within normal limits, no bruits.  Abdomen - normal active bowel sounds, soft, non tender, no masses, guarding or rebound, no hepatosplenomegaly  Skin exam in different areas does show irregularly shaped's, flat to slightly raised slightly reddish skin lesions.  I see it under her breast, no left arm, somewhat on the neck.    Labs sent    ASSESSMENT:  Skin rash, it does appear to me to likely be hives, doubt eczema, doubt systemic illness.    PLAN:  Will check labs  I discussed with her short course of prednisone but she would like to use antihistamines, she is use Zyrtec and it has helped.  She will do this for 10 to 14 days and if symptoms are not resolving she will let me know.  At that point could try a short course of steroid versus Derm referral.    The longitudinal plan of care for the diagnosis(es)/condition(s) as documented were addressed during this visit. Due to the added complexity in care, I will continue to support Katie in the subsequent management and with ongoing continuity of care.        Mahesh Santoyo M.D.                 activity

## 2025-05-09 ENCOUNTER — RESULTS FOLLOW-UP (OUTPATIENT)
Dept: FAMILY MEDICINE | Facility: CLINIC | Age: 65
End: 2025-05-09

## 2025-05-09 DIAGNOSIS — R21 RASH AND NONSPECIFIC SKIN ERUPTION: Primary | ICD-10-CM

## 2025-05-09 NOTE — RESULT ENCOUNTER NOTE
It was very nice to see you.  I am happy to report that your tests are all normal.  This includes your blood salts, kidney test, liver tests, proteins, sugar test for diabetes and complete blood count.  Please let me know if you have questions.    Mahesh Santoyo M.D.

## 2025-05-10 ENCOUNTER — LAB (OUTPATIENT)
Dept: LAB | Facility: CLINIC | Age: 65
End: 2025-05-10
Payer: COMMERCIAL

## 2025-05-10 DIAGNOSIS — R21 RASH AND NONSPECIFIC SKIN ERUPTION: ICD-10-CM

## 2025-05-10 LAB — ERYTHROCYTE [SEDIMENTATION RATE] IN BLOOD BY WESTERGREN METHOD: 13 MM/HR (ref 0–30)

## 2025-05-10 PROCEDURE — 36415 COLL VENOUS BLD VENIPUNCTURE: CPT

## 2025-05-10 PROCEDURE — 85652 RBC SED RATE AUTOMATED: CPT

## 2025-05-10 PROCEDURE — 86140 C-REACTIVE PROTEIN: CPT

## 2025-05-11 LAB — CRP SERPL-MCNC: <3 MG/L

## 2025-05-12 ENCOUNTER — RESULTS FOLLOW-UP (OUTPATIENT)
Dept: FAMILY MEDICINE | Facility: CLINIC | Age: 65
End: 2025-05-12

## 2025-07-03 ENCOUNTER — TRANSFERRED RECORDS (OUTPATIENT)
Dept: HEALTH INFORMATION MANAGEMENT | Facility: CLINIC | Age: 65
End: 2025-07-03
Payer: COMMERCIAL

## (undated) RX ORDER — FENTANYL CITRATE 50 UG/ML
INJECTION, SOLUTION INTRAMUSCULAR; INTRAVENOUS
Status: DISPENSED
Start: 2017-03-14